# Patient Record
Sex: FEMALE | Race: WHITE | NOT HISPANIC OR LATINO | Employment: OTHER | URBAN - METROPOLITAN AREA
[De-identification: names, ages, dates, MRNs, and addresses within clinical notes are randomized per-mention and may not be internally consistent; named-entity substitution may affect disease eponyms.]

---

## 2017-01-18 ENCOUNTER — ALLSCRIPTS OFFICE VISIT (OUTPATIENT)
Dept: OTHER | Facility: OTHER | Age: 72
End: 2017-01-18

## 2017-02-02 ENCOUNTER — GENERIC CONVERSION - ENCOUNTER (OUTPATIENT)
Dept: OTHER | Facility: OTHER | Age: 72
End: 2017-02-02

## 2017-02-07 ENCOUNTER — TRANSCRIBE ORDERS (OUTPATIENT)
Dept: LAB | Facility: CLINIC | Age: 72
End: 2017-02-07

## 2017-02-07 ENCOUNTER — APPOINTMENT (OUTPATIENT)
Dept: LAB | Facility: CLINIC | Age: 72
End: 2017-02-07
Payer: MEDICARE

## 2017-02-07 DIAGNOSIS — E03.9 UNSPECIFIED HYPOTHYROIDISM: ICD-10-CM

## 2017-02-07 DIAGNOSIS — E55.9 UNSPECIFIED VITAMIN D DEFICIENCY: ICD-10-CM

## 2017-02-07 DIAGNOSIS — D35.3 BENIGN NEOPLASM OF PITUITARY GLAND AND CRANIOPHARYNGEAL DUCT (POUCH) (HCC): ICD-10-CM

## 2017-02-07 DIAGNOSIS — I95.9 HYPOTENSION, UNSPECIFIED: ICD-10-CM

## 2017-02-07 DIAGNOSIS — E22.1 HYPERPROLACTINEMIA (HCC): ICD-10-CM

## 2017-02-07 DIAGNOSIS — E66.9 OBESITY: ICD-10-CM

## 2017-02-07 DIAGNOSIS — E22.9 HYPERFUNCTION OF PITUITARY GLAND (HCC): ICD-10-CM

## 2017-02-07 DIAGNOSIS — D35.2 BENIGN NEOPLASM OF PITUITARY GLAND AND CRANIOPHARYNGEAL DUCT (POUCH) (HCC): ICD-10-CM

## 2017-02-07 DIAGNOSIS — D35.2 BENIGN NEOPLASM OF PITUITARY GLAND (HCC): ICD-10-CM

## 2017-02-07 DIAGNOSIS — R55 SYNCOPE AND COLLAPSE: ICD-10-CM

## 2017-02-07 DIAGNOSIS — E03.9 HYPOTHYROIDISM: ICD-10-CM

## 2017-02-07 DIAGNOSIS — E55.9 VITAMIN D DEFICIENCY: ICD-10-CM

## 2017-02-07 DIAGNOSIS — M81.0 AGE-RELATED OSTEOPOROSIS WITHOUT CURRENT PATHOLOGICAL FRACTURE: ICD-10-CM

## 2017-02-07 DIAGNOSIS — I95.9 HYPOTENSION: ICD-10-CM

## 2017-02-07 DIAGNOSIS — E66.9 OBESITY, UNSPECIFIED: ICD-10-CM

## 2017-02-07 DIAGNOSIS — M81.0 OSTEOPOROSIS, UNSPECIFIED: ICD-10-CM

## 2017-02-07 DIAGNOSIS — E22.9 PITUITARY HYPERFUNCTION (HCC): ICD-10-CM

## 2017-02-07 DIAGNOSIS — E22.9 PITUITARY HYPERFUNCTION (HCC): Primary | ICD-10-CM

## 2017-02-07 LAB
25(OH)D3 SERPL-MCNC: 45.7 NG/ML (ref 30–100)
ALBUMIN SERPL BCP-MCNC: 3.4 G/DL (ref 3.5–5)
ANION GAP SERPL CALCULATED.3IONS-SCNC: 8 MMOL/L (ref 4–13)
BUN SERPL-MCNC: 17 MG/DL (ref 5–25)
CALCIUM SERPL-MCNC: 9.3 MG/DL (ref 8.3–10.1)
CHLORIDE SERPL-SCNC: 107 MMOL/L (ref 100–108)
CO2 SERPL-SCNC: 26 MMOL/L (ref 21–32)
CREAT SERPL-MCNC: 0.92 MG/DL (ref 0.6–1.3)
FSH SERPL-ACNC: 51.8 MIU/ML
GFR SERPL CREATININE-BSD FRML MDRD: >60 ML/MIN/1.73SQ M
GLUCOSE SERPL-MCNC: 103 MG/DL (ref 65–140)
LH SERPL-ACNC: 25.8 MIU/ML
PHOSPHATE SERPL-MCNC: 3.4 MG/DL (ref 2.3–4.1)
POTASSIUM SERPL-SCNC: 4.3 MMOL/L (ref 3.5–5.3)
PTH-INTACT SERPL-MCNC: 67.5 PG/ML (ref 14–72)
SODIUM SERPL-SCNC: 141 MMOL/L (ref 136–145)
T4 FREE SERPL-MCNC: 1.02 NG/DL (ref 0.76–1.46)
TSH SERPL DL<=0.05 MIU/L-ACNC: 1.71 UIU/ML (ref 0.36–3.74)

## 2017-02-07 PROCEDURE — 84146 ASSAY OF PROLACTIN: CPT

## 2017-02-07 PROCEDURE — 83001 ASSAY OF GONADOTROPIN (FSH): CPT

## 2017-02-07 PROCEDURE — 80069 RENAL FUNCTION PANEL: CPT

## 2017-02-07 PROCEDURE — 84439 ASSAY OF FREE THYROXINE: CPT

## 2017-02-07 PROCEDURE — 82024 ASSAY OF ACTH: CPT

## 2017-02-07 PROCEDURE — 36415 COLL VENOUS BLD VENIPUNCTURE: CPT

## 2017-02-07 PROCEDURE — 83002 ASSAY OF GONADOTROPIN (LH): CPT

## 2017-02-07 PROCEDURE — 83970 ASSAY OF PARATHORMONE: CPT

## 2017-02-07 PROCEDURE — 82306 VITAMIN D 25 HYDROXY: CPT

## 2017-02-07 PROCEDURE — 84305 ASSAY OF SOMATOMEDIN: CPT

## 2017-02-07 PROCEDURE — 84443 ASSAY THYROID STIM HORMONE: CPT

## 2017-02-08 LAB
ACTH PLAS-MCNC: 22.4 PG/ML (ref 7.2–63.3)
IGF-I SERPL-MCNC: 87 NG/ML (ref 38–163)
PROLACTIN SERPL-MCNC: 14 NG/ML

## 2017-02-16 ENCOUNTER — GENERIC CONVERSION - ENCOUNTER (OUTPATIENT)
Dept: OTHER | Facility: OTHER | Age: 72
End: 2017-02-16

## 2017-02-20 ENCOUNTER — GENERIC CONVERSION - ENCOUNTER (OUTPATIENT)
Dept: OTHER | Facility: OTHER | Age: 72
End: 2017-02-20

## 2017-03-09 ENCOUNTER — GENERIC CONVERSION - ENCOUNTER (OUTPATIENT)
Dept: OTHER | Facility: OTHER | Age: 72
End: 2017-03-09

## 2017-03-10 ENCOUNTER — APPOINTMENT (OUTPATIENT)
Dept: LAB | Facility: CLINIC | Age: 72
End: 2017-03-10
Payer: MEDICARE

## 2017-03-10 ENCOUNTER — TRANSCRIBE ORDERS (OUTPATIENT)
Dept: MRI IMAGING | Facility: CLINIC | Age: 72
End: 2017-03-10

## 2017-03-10 DIAGNOSIS — I95.9 HYPOTENSION, UNSPECIFIED: ICD-10-CM

## 2017-03-10 DIAGNOSIS — D35.2 BENIGN NEOPLASM OF PITUITARY GLAND AND CRANIOPHARYNGEAL DUCT (POUCH) (HCC): ICD-10-CM

## 2017-03-10 DIAGNOSIS — E55.9 UNSPECIFIED VITAMIN D DEFICIENCY: Primary | ICD-10-CM

## 2017-03-10 DIAGNOSIS — M81.0 OSTEOPOROSIS, UNSPECIFIED: ICD-10-CM

## 2017-03-10 DIAGNOSIS — E22.9 PITUITARY HYPERFUNCTION (HCC): ICD-10-CM

## 2017-03-10 DIAGNOSIS — E66.9 OBESITY, UNSPECIFIED: ICD-10-CM

## 2017-03-10 DIAGNOSIS — E22.1 HYPERPROLACTINEMIA (HCC): ICD-10-CM

## 2017-03-10 DIAGNOSIS — D35.2 BENIGN NEOPLASM OF PITUITARY GLAND (HCC): ICD-10-CM

## 2017-03-10 DIAGNOSIS — R55 SYNCOPE AND COLLAPSE: ICD-10-CM

## 2017-03-10 DIAGNOSIS — D35.3 BENIGN NEOPLASM OF PITUITARY GLAND AND CRANIOPHARYNGEAL DUCT (POUCH) (HCC): ICD-10-CM

## 2017-03-10 DIAGNOSIS — E03.9 UNSPECIFIED HYPOTHYROIDISM: ICD-10-CM

## 2017-03-10 DIAGNOSIS — E03.9 HYPOTHYROIDISM: ICD-10-CM

## 2017-03-10 DIAGNOSIS — E55.9 UNSPECIFIED VITAMIN D DEFICIENCY: ICD-10-CM

## 2017-03-10 PROCEDURE — 36415 COLL VENOUS BLD VENIPUNCTURE: CPT

## 2017-03-10 PROCEDURE — 84146 ASSAY OF PROLACTIN: CPT

## 2017-03-13 ENCOUNTER — APPOINTMENT (OUTPATIENT)
Dept: LAB | Facility: CLINIC | Age: 72
End: 2017-03-13
Payer: MEDICARE

## 2017-03-13 DIAGNOSIS — D35.2 BENIGN NEOPLASM OF PITUITARY GLAND (HCC): ICD-10-CM

## 2017-03-13 DIAGNOSIS — E03.9 HYPOTHYROIDISM: ICD-10-CM

## 2017-03-13 LAB — CORTIS AM PEAK SERPL-MCNC: 24.2 UG/ML (ref 4.2–22.4)

## 2017-03-13 PROCEDURE — 36415 COLL VENOUS BLD VENIPUNCTURE: CPT

## 2017-03-13 PROCEDURE — 82533 TOTAL CORTISOL: CPT

## 2017-03-14 LAB — MISCELLANEOUS LAB TEST RESULT: NORMAL

## 2017-03-17 ENCOUNTER — GENERIC CONVERSION - ENCOUNTER (OUTPATIENT)
Dept: OTHER | Facility: OTHER | Age: 72
End: 2017-03-17

## 2017-04-14 ENCOUNTER — GENERIC CONVERSION - ENCOUNTER (OUTPATIENT)
Dept: OTHER | Facility: OTHER | Age: 72
End: 2017-04-14

## 2017-04-17 ENCOUNTER — ALLSCRIPTS OFFICE VISIT (OUTPATIENT)
Dept: OTHER | Facility: OTHER | Age: 72
End: 2017-04-17

## 2017-04-27 ENCOUNTER — GENERIC CONVERSION - ENCOUNTER (OUTPATIENT)
Dept: OTHER | Facility: OTHER | Age: 72
End: 2017-04-27

## 2017-05-02 ENCOUNTER — ALLSCRIPTS OFFICE VISIT (OUTPATIENT)
Dept: OTHER | Facility: OTHER | Age: 72
End: 2017-05-02

## 2017-05-02 DIAGNOSIS — I48.91 ATRIAL FIBRILLATION (HCC): ICD-10-CM

## 2017-05-08 ENCOUNTER — APPOINTMENT (OUTPATIENT)
Dept: LAB | Facility: CLINIC | Age: 72
End: 2017-05-08
Payer: MEDICARE

## 2017-05-08 DIAGNOSIS — E78.00 PURE HYPERCHOLESTEROLEMIA: ICD-10-CM

## 2017-05-08 DIAGNOSIS — D35.2 BENIGN NEOPLASM OF PITUITARY GLAND (HCC): ICD-10-CM

## 2017-05-08 DIAGNOSIS — I48.91 ATRIAL FIBRILLATION (HCC): ICD-10-CM

## 2017-05-08 DIAGNOSIS — I10 ESSENTIAL (PRIMARY) HYPERTENSION: ICD-10-CM

## 2017-05-08 DIAGNOSIS — E55.9 VITAMIN D DEFICIENCY: ICD-10-CM

## 2017-05-08 DIAGNOSIS — E03.9 HYPOTHYROIDISM: ICD-10-CM

## 2017-05-08 DIAGNOSIS — E22.9 HYPERFUNCTION OF PITUITARY GLAND (HCC): ICD-10-CM

## 2017-05-08 LAB
ALBUMIN SERPL BCP-MCNC: 3.6 G/DL (ref 3.5–5)
ALP SERPL-CCNC: 136 U/L (ref 46–116)
ALT SERPL W P-5'-P-CCNC: 14 U/L (ref 12–78)
ANION GAP SERPL CALCULATED.3IONS-SCNC: 10 MMOL/L (ref 4–13)
AST SERPL W P-5'-P-CCNC: 13 U/L (ref 5–45)
BILIRUB SERPL-MCNC: 0.33 MG/DL (ref 0.2–1)
BUN SERPL-MCNC: 12 MG/DL (ref 5–25)
CALCIUM SERPL-MCNC: 9.3 MG/DL (ref 8.3–10.1)
CHLORIDE SERPL-SCNC: 107 MMOL/L (ref 100–108)
CHOLEST SERPL-MCNC: 237 MG/DL (ref 50–200)
CK SERPL-CCNC: 27 U/L (ref 26–192)
CO2 SERPL-SCNC: 24 MMOL/L (ref 21–32)
CORTIS AM PEAK SERPL-MCNC: 18.9 UG/ML (ref 4.2–22.4)
CREAT SERPL-MCNC: 0.83 MG/DL (ref 0.6–1.3)
GFR SERPL CREATININE-BSD FRML MDRD: >60 ML/MIN/1.73SQ M
GLUCOSE P FAST SERPL-MCNC: 101 MG/DL (ref 65–99)
HDLC SERPL-MCNC: 96 MG/DL (ref 40–60)
LDLC SERPL CALC-MCNC: 122 MG/DL (ref 0–100)
POTASSIUM SERPL-SCNC: 4.1 MMOL/L (ref 3.5–5.3)
PROT SERPL-MCNC: 7.3 G/DL (ref 6.4–8.2)
SODIUM SERPL-SCNC: 141 MMOL/L (ref 136–145)
T4 FREE SERPL-MCNC: 1.05 NG/DL (ref 0.76–1.46)
TRIGL SERPL-MCNC: 97 MG/DL
TSH SERPL DL<=0.05 MIU/L-ACNC: 2.72 UIU/ML (ref 0.36–3.74)

## 2017-05-08 PROCEDURE — 80061 LIPID PANEL: CPT

## 2017-05-08 PROCEDURE — 82533 TOTAL CORTISOL: CPT

## 2017-05-08 PROCEDURE — 84439 ASSAY OF FREE THYROXINE: CPT

## 2017-05-08 PROCEDURE — 82550 ASSAY OF CK (CPK): CPT

## 2017-05-08 PROCEDURE — 84443 ASSAY THYROID STIM HORMONE: CPT

## 2017-05-08 PROCEDURE — 36415 COLL VENOUS BLD VENIPUNCTURE: CPT

## 2017-05-08 PROCEDURE — 80053 COMPREHEN METABOLIC PANEL: CPT

## 2017-05-16 ENCOUNTER — ALLSCRIPTS OFFICE VISIT (OUTPATIENT)
Dept: OTHER | Facility: OTHER | Age: 72
End: 2017-05-16

## 2017-05-18 ENCOUNTER — HOSPITAL ENCOUNTER (OUTPATIENT)
Dept: NON INVASIVE DIAGNOSTICS | Facility: CLINIC | Age: 72
Discharge: HOME | End: 2017-05-18
Payer: MEDICARE

## 2017-05-18 DIAGNOSIS — I48.91 ATRIAL FIBRILLATION (HCC): ICD-10-CM

## 2017-05-18 PROCEDURE — 93306 TTE W/DOPPLER COMPLETE: CPT

## 2017-05-20 ENCOUNTER — GENERIC CONVERSION - ENCOUNTER (OUTPATIENT)
Dept: OTHER | Facility: OTHER | Age: 72
End: 2017-05-20

## 2017-06-08 DIAGNOSIS — E22.9 HYPERFUNCTION OF PITUITARY GLAND (HCC): ICD-10-CM

## 2017-06-16 ENCOUNTER — GENERIC CONVERSION - ENCOUNTER (OUTPATIENT)
Dept: OTHER | Facility: OTHER | Age: 72
End: 2017-06-16

## 2017-07-03 ENCOUNTER — APPOINTMENT (OUTPATIENT)
Dept: LAB | Facility: CLINIC | Age: 72
End: 2017-07-03
Payer: MEDICARE

## 2017-07-03 ENCOUNTER — HOSPITAL ENCOUNTER (OUTPATIENT)
Dept: MRI IMAGING | Facility: CLINIC | Age: 72
Discharge: HOME | End: 2017-07-03
Payer: MEDICARE

## 2017-07-03 DIAGNOSIS — E22.9 HYPERFUNCTION OF PITUITARY GLAND (HCC): ICD-10-CM

## 2017-07-03 LAB — PROLACTIN SERPL-MCNC: 15.2 NG/ML

## 2017-07-03 PROCEDURE — 84146 ASSAY OF PROLACTIN: CPT

## 2017-07-03 PROCEDURE — A9585 GADOBUTROL INJECTION: HCPCS | Performed by: NURSE PRACTITIONER

## 2017-07-03 PROCEDURE — 36415 COLL VENOUS BLD VENIPUNCTURE: CPT

## 2017-07-03 PROCEDURE — 70553 MRI BRAIN STEM W/O & W/DYE: CPT

## 2017-07-03 RX ADMIN — GADOBUTROL 8 ML: 604.72 INJECTION INTRAVENOUS at 12:51

## 2017-07-04 ENCOUNTER — GENERIC CONVERSION - ENCOUNTER (OUTPATIENT)
Dept: OTHER | Facility: OTHER | Age: 72
End: 2017-07-04

## 2017-07-06 ENCOUNTER — GENERIC CONVERSION - ENCOUNTER (OUTPATIENT)
Dept: OTHER | Facility: OTHER | Age: 72
End: 2017-07-06

## 2017-07-10 ENCOUNTER — GENERIC CONVERSION - ENCOUNTER (OUTPATIENT)
Dept: OTHER | Facility: OTHER | Age: 72
End: 2017-07-10

## 2017-07-10 DIAGNOSIS — E22.9 HYPERFUNCTION OF PITUITARY GLAND (HCC): ICD-10-CM

## 2017-07-11 ENCOUNTER — GENERIC CONVERSION - ENCOUNTER (OUTPATIENT)
Dept: OTHER | Facility: OTHER | Age: 72
End: 2017-07-11

## 2017-07-18 ENCOUNTER — ALLSCRIPTS OFFICE VISIT (OUTPATIENT)
Dept: OTHER | Facility: OTHER | Age: 72
End: 2017-07-18

## 2017-09-27 ENCOUNTER — GENERIC CONVERSION - ENCOUNTER (OUTPATIENT)
Dept: OTHER | Facility: OTHER | Age: 72
End: 2017-09-27

## 2017-11-07 ENCOUNTER — TRANSCRIBE ORDERS (OUTPATIENT)
Dept: LAB | Facility: HOSPITAL | Age: 72
End: 2017-11-07

## 2017-11-07 ENCOUNTER — APPOINTMENT (OUTPATIENT)
Dept: LAB | Facility: HOSPITAL | Age: 72
End: 2017-11-07
Payer: MEDICARE

## 2017-11-07 DIAGNOSIS — R73.01 IMPAIRED FASTING GLUCOSE: ICD-10-CM

## 2017-11-07 DIAGNOSIS — Q99.8 IGF-1 DEFICIENCY: Primary | ICD-10-CM

## 2017-11-07 DIAGNOSIS — Q99.8 IGF-1 DEFICIENCY: ICD-10-CM

## 2017-11-07 DIAGNOSIS — E23.0: ICD-10-CM

## 2017-11-07 DIAGNOSIS — E55.9 VITAMIN D DEFICIENCY: ICD-10-CM

## 2017-11-07 DIAGNOSIS — E78.00 PURE HYPERCHOLESTEROLEMIA: ICD-10-CM

## 2017-11-07 LAB
25(OH)D3 SERPL-MCNC: 55.8 NG/ML (ref 30–100)
ALBUMIN SERPL BCP-MCNC: 3.7 G/DL (ref 3.5–5)
ALP SERPL-CCNC: 137 U/L (ref 46–116)
ALT SERPL W P-5'-P-CCNC: 14 U/L (ref 12–78)
ANION GAP SERPL CALCULATED.3IONS-SCNC: 11 MMOL/L (ref 4–13)
AST SERPL W P-5'-P-CCNC: 14 U/L (ref 5–45)
BASOPHILS # BLD AUTO: 0.13 THOUSANDS/ΜL (ref 0–0.1)
BASOPHILS NFR BLD AUTO: 2 % (ref 0–1)
BILIRUB SERPL-MCNC: 0.5 MG/DL (ref 0.2–1)
BUN SERPL-MCNC: 12 MG/DL (ref 5–25)
CALCIUM SERPL-MCNC: 9.3 MG/DL (ref 8.3–10.1)
CHLORIDE SERPL-SCNC: 102 MMOL/L (ref 100–108)
CHOLEST SERPL-MCNC: 207 MG/DL (ref 50–200)
CO2 SERPL-SCNC: 26 MMOL/L (ref 21–32)
CORTIS AM PEAK SERPL-MCNC: 19.1 UG/DL (ref 4.2–22.4)
CREAT SERPL-MCNC: 0.92 MG/DL (ref 0.6–1.3)
EOSINOPHIL # BLD AUTO: 0.35 THOUSAND/ΜL (ref 0–0.61)
EOSINOPHIL NFR BLD AUTO: 4 % (ref 0–6)
ERYTHROCYTE [DISTWIDTH] IN BLOOD BY AUTOMATED COUNT: 13.4 % (ref 11.6–15.1)
EST. AVERAGE GLUCOSE BLD GHB EST-MCNC: 114 MG/DL
GFR SERPL CREATININE-BSD FRML MDRD: 63 ML/MIN/1.73SQ M
GLUCOSE P FAST SERPL-MCNC: 121 MG/DL (ref 65–99)
HBA1C MFR BLD: 5.6 % (ref 4.2–6.3)
HCT VFR BLD AUTO: 38.4 % (ref 34.8–46.1)
HDLC SERPL-MCNC: 90 MG/DL (ref 40–60)
HGB BLD-MCNC: 12.4 G/DL (ref 11.5–15.4)
LDLC SERPL CALC-MCNC: 99 MG/DL (ref 0–100)
LYMPHOCYTES # BLD AUTO: 1.42 THOUSANDS/ΜL (ref 0.6–4.47)
LYMPHOCYTES NFR BLD AUTO: 18 % (ref 14–44)
MCH RBC QN AUTO: 30.4 PG (ref 26.8–34.3)
MCHC RBC AUTO-ENTMCNC: 32.3 G/DL (ref 31.4–37.4)
MCV RBC AUTO: 94 FL (ref 82–98)
MONOCYTES # BLD AUTO: 0.64 THOUSAND/ΜL (ref 0.17–1.22)
MONOCYTES NFR BLD AUTO: 8 % (ref 4–12)
NEUTROPHILS # BLD AUTO: 5.38 THOUSANDS/ΜL (ref 1.85–7.62)
NEUTS SEG NFR BLD AUTO: 68 % (ref 43–75)
NRBC BLD AUTO-RTO: 0 /100 WBCS
PLATELET # BLD AUTO: 202 THOUSANDS/UL (ref 149–390)
PMV BLD AUTO: 11.7 FL (ref 8.9–12.7)
POTASSIUM SERPL-SCNC: 4.2 MMOL/L (ref 3.5–5.3)
PROLACTIN SERPL-MCNC: 13.7 NG/ML
PROT SERPL-MCNC: 7.7 G/DL (ref 6.4–8.2)
RBC # BLD AUTO: 4.08 MILLION/UL (ref 3.81–5.12)
SODIUM SERPL-SCNC: 139 MMOL/L (ref 136–145)
T4 FREE SERPL-MCNC: 1.11 NG/DL (ref 0.76–1.46)
TRIGL SERPL-MCNC: 88 MG/DL
TSH SERPL DL<=0.05 MIU/L-ACNC: 2.61 UIU/ML (ref 0.36–3.74)
WBC # BLD AUTO: 7.94 THOUSAND/UL (ref 4.31–10.16)

## 2017-11-07 PROCEDURE — 83036 HEMOGLOBIN GLYCOSYLATED A1C: CPT

## 2017-11-07 PROCEDURE — 82306 VITAMIN D 25 HYDROXY: CPT

## 2017-11-07 PROCEDURE — 84439 ASSAY OF FREE THYROXINE: CPT

## 2017-11-07 PROCEDURE — 82533 TOTAL CORTISOL: CPT

## 2017-11-07 PROCEDURE — 80061 LIPID PANEL: CPT

## 2017-11-07 PROCEDURE — 84443 ASSAY THYROID STIM HORMONE: CPT

## 2017-11-07 PROCEDURE — 80053 COMPREHEN METABOLIC PANEL: CPT

## 2017-11-07 PROCEDURE — 84146 ASSAY OF PROLACTIN: CPT

## 2017-11-07 PROCEDURE — 85025 COMPLETE CBC W/AUTO DIFF WBC: CPT

## 2017-11-07 PROCEDURE — 36415 COLL VENOUS BLD VENIPUNCTURE: CPT

## 2017-11-14 ENCOUNTER — ALLSCRIPTS OFFICE VISIT (OUTPATIENT)
Dept: OTHER | Facility: OTHER | Age: 72
End: 2017-11-14

## 2017-11-15 NOTE — PROGRESS NOTES
Assessment  Assessed    1  Atrial fibrillation (427 31) (I48 91)   2  Anticoagulated by anticoagulation treatment (V58 61) (Z79 01)   3  Anxiety (300 00) (F41 9)   4  Benign essential hypertension (401 1) (I10)   5  Hypercholesterolemia (272 0) (E78 00)    Plan  Atrial fibrillation    · Xarelto 20 MG Oral Tablet   Rx By: Angelita Purcell; Dispense: 30 Days ; #:30 TAB; Refill: 5;For: Atrial fibrillation; THAO = N; Sent To: [facility]/PHARMACY #[id number] Last Updated By: Harshal Jorgensen; 11/14/2017 2:51:58 PM   · Eliquis 5 MG Oral Tablet; Take 1 tablet twice daily   Rx By: Harshal Jorgensen; Dispense: 0 Days ; #:60 Tablet; Refill: 5;Atrial fibrillation; THAO = N; Print Rx    Discussion/Summary  Cardiology Discussion Summary Free Text Note Form [facility]:   Patient with multiple medical problems who seems to be doing reasonably well from cardiac standpoint  Previous studies reviewed with patient  Medications reviewed and possible side effects discussed  concepts of cardiovascular disease , signs and symptoms of heart disease  Dietary and risk factor modification reinforced  All questions answered  Safety measures reviewed  Patient advised to report any problems prompting medical attention  Patient understands the risks and benefits of anticoagulation to prevent thromboembolic risk from atrial fibrillation  Patient will be switched switched to Eliquis starting January  Prescription printed out for her  Patient report any bleeding issues  Patient will continue to follow up with primary care physician, Neurology as well as Endocrinology  Symptoms to watch out from cardiac standpoint discussed with patient  Importance of adequate hydration reinforced with the patient  Patient had a few questions which were answered  Follow-up in 6 months  Goals and Barriers: The patient has the current Goals: Compliance with medications  The patent has the current Barriers: None  Patient's Capacity to Self-Care: Patient is able to Self-Care     Patient Education: Educational resources provided: Please see discussion summary  Medication SE Review and Pt Understands Tx: Possible side effects of new medications were reviewed with the patient/guardian today  Counseling Documentation With Imm: The patient was counseled regarding diagnostic results,-- risk factor reductions,-- impressions,-- risks and benefits of treatment options,-- importance of compliance with treatment  Chief Complaint  Chief Complaint Chronic Condition [facility]: Patient is here today for follow up of chronic conditions described in HPI  History of Present Illness  Cardiology [facility] Free Text Note Form [facility]: Patient presents for follow-up visit  Patient does have multiple medical problems including pituitary adenoma, atrial fibrillation on anticoagulation  Patient states that her anticoagulation is not covered starting January  The alternative is Eliquis or Pradaxa  Patient denies any chest pain  Patient does have some shortness of breath with exertion which is stable  No history of palpitations  No history of leg edema orthopnea PND  No history of bleeding issues  No history of presyncope syncope  Patient states that she has been compliant with all her present medications and followed by Neurology as well as Endocrinology  Review of Systems  Cardiology Female ROS:    Cardiac: as noted in HPI  Skin: No complaints of nonhealing sores or skin rash  Genitourinary: No complaints of recurrent urinary tract infections, frequent urination at night, difficult urination, blood in urine, kidney stones, loss of bladder control, kidney problems, denies any birth control or hormone replacement, is not post menopausal, not currently pregnant  Psychological: anxiety  General: No complaints of trouble sleeping, lack of energy, fatigue, appetite changes, weight changes, fever, frequent infections, or night sweats    Respiratory: No complaints of shortness of breath, cough with sputum, or wheezing  HEENT: No complaints of serious problems, hearing problems, nose problems, throat problems, or snoring  Gastrointestinal: No complaints of liver problems, nausea, vomiting, heartburn, constipation, bloody stools, diarrhea, problems swallowing, adbominal pain, or rectal bleeding  Hematologic: No complaints of bleeding disorders, anemia, blood clots, or excessive brusing  Neurological: as noted in HPI  Musculoskeletal: arthritis   ROS Reviewed:   ROS reviewed  Active Problems  Problems    1  Abnormal brain MRI (793 0) (R90 89)   2  Abnormal fasting glucose (790 29) (R73 01)   3  Anticoagulated by anticoagulation treatment (V58 61) (Z79 01)   4  Anxiety (300 00) (F41 9)   5  Atrial fibrillation (427 31) (I48 91)   6  Benign essential hypertension (401 1) (I10)   7  Blepharoptosis (374 30) (H02 409)   8  Breast cancer screening (V76 10) (Z12 39)   9  Depression (311) (F32 9)   10  Dysphagia, pharyngoesophageal (787 24) (R13 14)   11  Elevated prolactin level (253 1) (E22 9)   12  Encounter for monitoring long-term proton pump inhibitor therapy (V58 83,V58 69)  (Z51 81,Z79 899)   13  Encounter for special screening examination for genitourinary disorder (V81 6) (Z13 89)   14  Esophageal ring, acquired (530 3) (K22 2)   15  Fibrocystic breast disease (610 1) (N60 19)   16  Gastroesophageal reflux disease with esophagitis (530 11) (K21 0)   17  Headache (784 0) (R51)   18  Hypercholesterolemia (272 0) (E78 00)   19  Hyperprolactinemia (253 1) (E22 1)   20  Hypotension (458 9) (I95 9)   21  Hypothyroidism (244 9) (E03 9)   22  Loss of smell (781 1) (R43 0)   23  Lumbago With Sciatica (724 3)   24  Need for influenza vaccination (V04 81) (Z23)   25  History of Need for pneumococcal vaccination (V03 82) (Z23)   26  Need for revaccination (V05 9) (Z23)   27  Obesity (278 00) (E66 9)   28  Osteoporosis (733 00) (M81 0)   29  Pituitary adenoma (227 3) (D35 2)   30  Postural hypotension (458 0) (I95 1)   31  Preoperative evaluation to rule out surgical contraindication (V72 83) (Z01 818)   32  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)   33  Screening for genitourinary condition (V81 6) (Z13 89)   34  Strain of thoracic region (847 1) (S29 019A)   35  Syncope (780 2) (R55)   36  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)   37  Unstable balance (781 2) (R26 89)   38  Visual disturbances (368 9) (H53 9)   39  Vitamin D deficiency (268 9) (E55 9)   40  Weight gain (783 1) (R63 5)    Past Medical History  Problems    1  History of A-fib (427 31) (I48 91)   2  History of Ankle fracture (824 8) (S82 899A)   3  History of Ankle swelling, left (719 07) (M25 472)   4  Anxiety (300 00) (F41 9)   5  History of Brain mass (348 9) (G93 9)   6  History of Cataract (366 9) (H26 9)   7  History of Chest discomfort (786 59) (R07 89)   8  History of Diplopia (368 2) (H53 2)   9  History of Dysphagia, pharyngoesophageal phase (787 24) (R13 14)   10  History of Dyspnea (786 09) (R06 00)   11  History of Esophageal Perforation (530 4)   12  History of Fracture, shoulder (812 00) (S42 90XA)   13  History of Gastroduodenitis (535 50)   14  History of anemia (V12 3) (Z86 2)   15  History of chest pain (V13 89) (Z87 898)   16  History of fatigue (V13 89) (Z87 898)   17  History of gastritis (V12 79) (Z87 19)   18  History of Hyperlipidemia (272 4) (E78 5)   19  History of Hypertension (401 9) (I10)   20  Hypothyroidism (244 9) (E03 9)   21  History of Left heart failure (428 1) (I50 1)   22  History of Lightheadedness (780 4) (R42)   23  History of 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227 3) (D35 2)   24  History of Miscarriage (634 90) (O03 9)   25  History of Myalgia And Myositis (729 1)   26  History of Need for pneumococcal vaccination (V03 82) (Z23)   27  History of Pain, knee (719 46) (M25 569)   28  Pituitary adenoma (227 3) (D35 2)   29  History of Pulse irregularity (785 9) (R09 89)   30  History of Renal failure (586) (N19)   31   History of Wrist fracture (814 00) (P43 489U)  Active Problems And Past Medical History Reviewed: The active problems and past medical history were reviewed and updated today  Surgical History  Problems    1  History of Appendectomy   2  History of Cataract Surgery   3  History of Cath Stent Placement   4  History of Diagnostic Esophagogastroduodenoscopy   5  History of Dilation And Curettage   6  History of Percutaneous Vertebral Augmentation Kyphoplasty   7  History of Tonsillectomy   8  History of Total Abdominal Hysterectomy With Removal Of Both Ovaries  Surgical History Reviewed: The surgical history was reviewed and updated today  Family History  Mother    1  Family history of    2  Family history of Osteoporosis (V17 81)   3  Family history of Sepsis  Father    4  Family history of Cerebellar Hemorrhage   5  Family history of    6  Family history of Hemorrhage in the brain   7  Family history of Hypertension (V17 49)  Sister    6  Family history of Thyroid Disorder (V18 19)  Maternal Grandmother    9  Family history of Colon Cancer (V16 0)   10  Family history of   Paternal Grandmother    6  Family history of Carcinoma Of The Stomach (V16 0)   12  Family history of    15  Family history of Stomach cancer  Maternal Grandfather    15  Family history of Acute Myocardial Infarction (V17 3)   15  Family history of   Paternal Grandfather    12  Family history of    16  Family history of Urinary retention  Paternal Uncle    25  Family history of Stroke Syndrome (V17 1)  Family History Reviewed: The family history was reviewed and updated today  Social History  Problems    · Alcohol Use (History)   · Being A Social Drinker   · Completed technical school   · Drinks wine (V49 89) (Z78 9)   · Functioning activity level   · Lives independently   · Marital History -    · Never A Smoker   · Never Used Drugs   · No drug use   · Occupation: Retired  Social History Reviewed:  The social history was reviewed and updated today  Current Meds   1  Amitriptyline HCl - 25 MG Oral Tablet; TAKE 1 TABLET AT BEDTIME; Therapy: 12SCF7680 to (Evaluate:80Lbh8761)  Requested for: 60PRX1902; Last Rx:12Oct2017 Ordered   2  AmLODIPine Besylate 5 MG Oral Tablet; TAKE 1 TABLET BY MOUTH DAILY IN THE MORNING; Therapy: 63Zgz0532 to (Last Rx:72Jge4867)  Requested for: 44Hxj2907 Ordered   3  Butalbital-APAP-Caffeine -40 MG Oral Tablet; TAKE 1 TABLET BY MOUTH TWICE A DAY AS NEEDED; Therapy: 53MPM9366 to (Lore Adams)  Requested for: 66IFL0018; Last Rx:07Nov2017 Ordered   4  Cabergoline 0 5 MG Oral Tablet; take 1 tablet every day; Therapy: 98PZE3112 to (Evaluate:69Dye5430)  Requested for: 60KRV3045; Last Rx:42Ilx8563 Ordered   5  Levothyroxine Sodium 25 MCG Oral Tablet; take 1 tablet by mouth every day; Therapy: 32UZS8629 to (Evaluate:19Oct2017)  Requested for: 74Zmp1330; Last Rx:93Zpo5302 Ordered   6  Lisinopril 5 MG Oral Tablet; take 1 tablet by mouth every day; Therapy: 15FXK8053 to (Last Rx:54Edc6536)  Requested for: 14Vdd5487 Ordered   7  LORazepam 1 MG Oral Tablet; take 1 tablet twice a day; Therapy: 15OTB7672 to (Antonia Bernabe)  Requested for: 42OEP9923; Last Rx:22Rgz6508 Ordered   8  Metoprolol Tartrate 50 MG Oral Tablet; take 1 tablet by mouth twice a day; Therapy: 45BYB5023 to (Evaluate:89Bdt0942)  Requested for: 21KZZ3079; Last Rx:47Zfa4023 Ordered   9  Midodrine HCl - 2 5 MG Oral Tablet; take 1 tablet by mouth twice a day; Therapy: 95IFE9555 to (Evaluate:02Jan2018)  Requested for: 71Gjq4746; Last Rx:85Vit2366 Ordered   10  Omeprazole 20 MG Oral Capsule Delayed Release; take 1 capsule twice a day; Therapy: 49QFL5972 to (Last Rx:09Nov2017)  Requested for: 81EQF0356 Ordered   11  Simvastatin 20 MG Oral Tablet; take 1 tablet daily at bedtime; Therapy: 47UEY0987 to [phone])  Requested for: 27AAX6828; Last  Rx:24Mar2017 Ordered   12  Vitamin D 2000 UNIT Oral Tablet;  Take 1 tablet daily; Therapy: 85VRN9283 to (Last Rx:48Dxb5395) Ordered   13  Xarelto 20 MG Oral Tablet; take 1 tablet by mouth every day; Therapy: 25EQM4361 to (Juan Polanco)  Requested for: 75Iny5196; Last  Rx:62Kbf9030 Ordered  Medication List Reviewed: The medication list was reviewed and updated today  Allergies  Medication    1  Keflex TABS   2  Cephalosporins  Non-Medication    3  No Known Environmental Allergies   4  No Known Food Allergies  Denied    5  Anesthesia Extension Tubing Miscellaneous    Vitals  Vital Signs    Recorded: 13CVY2516 02:32PM   Heart Rate 71   Systolic 352   Diastolic 82   Height 5 ft 3 in   Weight 178 lb    BMI Calculated 31 53   BSA Calculated 1 84   O2 Saturation 98       Physical Exam   Constitutional  General appearance: No acute distress, well appearing and well nourished  Eyes  Conjunctiva and Sclera examination: Conjunctiva pink, sclera anicteric  Ears, Nose, Mouth, and Throat - Oropharynx: Clear, nares are clear, mucous membranes are moist   Neck  Neck and thyroid: Normal, supple, trachea midline, no thyromegaly  Pulmonary  Respiratory effort: No increased work of breathing or signs of respiratory distress  Auscultation of lungs: Clear to auscultation, no rales, no rhonchi, no wheezing, good air movement  Cardiovascular  Auscultation of heart: Abnormal  -- Irregularly irregular  Carotid pulses: Normal, 2+ bilaterally  Peripheral vascular exam: Normal pulses throughout, no tenderness, erythema or swelling  Pedal pulses: Normal, 2+ bilaterally  Examination of extremities for edema and/or varicosities: Normal    Abdomen  Abdomen: Non-tender and no distention  Liver and spleen: No hepatomegaly or splenomegaly  Musculoskeletal Gait and station: Normal gait  -- Digits and nails: Normal without clubbing or cyanosis  -- Inspection/palpation of joints, bones, and muscles: Normal, ROM normal    Skin - Skin and subcutaneous tissue: Normal without rashes or lesions  Skin is warm and well perfused, normal turgor  Neurologic - Speech: Normal    Psychiatric - Orientation to person, place, and time: Normal -- Mood and affect: Normal       Health Management  Health Maintenance   *VB - Fall Risk Assessment  (Dx Z13 89 Screen for Neurologic Disorder); every 1 year; [id number]; Next Due: 21Apr2015; Overdue    Future Appointments    Date/Time Provider Specialty Site   12/19/2017 01:55 PM Cole Baxter MD Neurology [facility]    12/05/2017 01:00 PM [address] [facility], [address]  Gastroenterology Adult [facility]   11/20/2017 11:30 AM SIMON Del Cid   Internal Medicine [facility]       Signatures   Electronically signed by : SIMON Mendoza ; Nov 14 2017  9:14PM EST                       (Author)

## 2017-11-16 DIAGNOSIS — R74.8 ABNORMAL LEVELS OF OTHER SERUM ENZYMES: ICD-10-CM

## 2017-11-16 DIAGNOSIS — N28.1 ACQUIRED CYST OF KIDNEY: ICD-10-CM

## 2017-11-16 DIAGNOSIS — R73.01 IMPAIRED FASTING GLUCOSE: ICD-10-CM

## 2017-11-16 DIAGNOSIS — E55.9 VITAMIN D DEFICIENCY: ICD-10-CM

## 2017-11-16 DIAGNOSIS — E22.9 HYPERFUNCTION OF PITUITARY GLAND (HCC): ICD-10-CM

## 2017-11-16 DIAGNOSIS — D35.2 BENIGN NEOPLASM OF PITUITARY GLAND (HCC): ICD-10-CM

## 2017-11-16 DIAGNOSIS — E78.00 PURE HYPERCHOLESTEROLEMIA: ICD-10-CM

## 2017-11-20 ENCOUNTER — ALLSCRIPTS OFFICE VISIT (OUTPATIENT)
Dept: OTHER | Facility: OTHER | Age: 72
End: 2017-11-20

## 2017-11-20 ENCOUNTER — APPOINTMENT (OUTPATIENT)
Dept: LAB | Facility: CLINIC | Age: 72
End: 2017-11-20
Payer: MEDICARE

## 2017-11-20 DIAGNOSIS — R74.8 ABNORMAL LEVELS OF OTHER SERUM ENZYMES: ICD-10-CM

## 2017-11-20 DIAGNOSIS — D35.2 BENIGN NEOPLASM OF PITUITARY GLAND (HCC): ICD-10-CM

## 2017-11-20 DIAGNOSIS — R73.01 IMPAIRED FASTING GLUCOSE: ICD-10-CM

## 2017-11-20 LAB
EST. AVERAGE GLUCOSE BLD GHB EST-MCNC: 111 MG/DL
GGT SERPL-CCNC: 276 U/L (ref 5–85)
HBA1C MFR BLD: 5.5 % (ref 4.2–6.3)

## 2017-11-20 PROCEDURE — 36415 COLL VENOUS BLD VENIPUNCTURE: CPT

## 2017-11-20 PROCEDURE — 83036 HEMOGLOBIN GLYCOSYLATED A1C: CPT

## 2017-11-20 PROCEDURE — 82977 ASSAY OF GGT: CPT

## 2017-11-20 PROCEDURE — 84080 ASSAY ALKALINE PHOSPHATASES: CPT

## 2017-11-20 PROCEDURE — 84075 ASSAY ALKALINE PHOSPHATASE: CPT

## 2017-11-21 NOTE — PROGRESS NOTES
Assessment    1  Benign essential hypertension (401 1) (I10)  2  Hypercholesterolemia (272 0) (E78 00)  3  Hyperprolactinemia (253 1) (E22 1)  4  Hypothyroidism (244 9) (E03 9)  5  Atrial fibrillation (427 31) (I48 91)  6  Pituitary adenoma (227 3) (D35 2)  7  Abnormal fasting glucose (790 29) (R73 01)    Plan  Abnormal fasting glucose, Atrial fibrillation, Benign essential hypertension, Hypercholesterolemia,Hypothyroidism    · (1) HEMOGLOBIN A1C; Status:Active; Requested for:20Apr2018; Abnormal fasting glucose, Elevated alkaline phosphatase level    · (1) HEMOGLOBIN A1C; Status:Active; Requested for:20Nov2017;   Atrial fibrillation, Benign essential hypertension, Hypercholesterolemia, Hypothyroidism    · (1) CK (CPK); Status:Active; Requested for:20Apr2018;    · (1) COMPREHENSIVE METABOLIC PANEL; Status:Active; Requested for:20Apr2018;    · (1) LIPID PANEL FASTING W DIRECT LDL REFLEX; Status:Active; Requested for:20Apr2018;    · (1) TSH; Status:Active; Requested for:20Apr2018;   Elevated alkaline phosphatase level    · (1) ALKALINE PHOSPHATASE ISOENZYMES; Status:Active; Requested for:20Nov2017;    · (1) BONE SPECIFIC ALKALINE PHOSPHATASE; Status:Active; Requested for:20Nov2017;    · * US ABDOMEN COMPLETE; Status:Hold For - Scheduling; Requested for:20Nov2017;   Elevated alkaline phosphatase level, Pituitary adenoma    · (1) GGT; Status:Active; Requested for:20Nov2017;     Discussion/Summary  Discussion Summary:   1) htn well controlled bmp wnlhyperlipidemia ldl at goal ck and lfts wnlpituitary adenoma following with neurology and with endocrine on cabergolineHypothyroidism chemically and clinically euthyroid follow tshelevated alk phos will chek abdominal USimpaired fasting glucose no overt dm check a1c  Counseling Documentation With Imm: The patient was counseled regarding diagnostic results,-- instructions for management,-- risk factor reductions,-- impressions        Chief Complaint  Chief Complaint Free Text Note Form: Follow up      History of Present Illness  HPI: Patient presents in followup for his medical problems and to review recent lab work  She is without acute complaints      Review of Systems  Complete-Female:  Constitutional: No fever, no chills, feels well, no tiredness, no recent weight gain or weight loss  Eyes: No complaints of eye pain, no red eyes, no eyesight problems, no discharge, no dry eyes, no itching of eyes  ENT: no complaints of earache, no loss of hearing, no nose bleeds, no nasal discharge, no sore throat, no hoarseness  Cardiovascular: No complaints of slow heart rate, no fast heart rate, no chest pain, no palpitations, no leg claudication, no lower extremity edema  Respiratory: No complaints of shortness of breath, no wheezing, no cough, no SOB on exertion, no orthopnea, no PND  Gastrointestinal: No complaints of abdominal pain, no constipation, no nausea or vomiting, no diarrhea, no bloody stools  Genitourinary: No complaints of dysuria, no incontinence, no pelvic pain, no dysmenorrhea, no vaginal discharge or bleeding  Musculoskeletal: No complaints of arthralgias, no myalgias, no joint swelling or stiffness, no limb pain or swelling  Integumentary: No complaints of skin rash or lesions, no itching, no skin wounds, no breast pain or lump  Neurological: No complaints of headache, no confusion, no convulsions, no numbness, no dizziness or fainting, no tingling, no limb weakness, no difficulty walking  Psychiatric: Not suicidal, no sleep disturbance, no anxiety or depression, no change in personality, no emotional problems  Endocrine: No complaints of proptosis, no hot flashes, no muscle weakness, no deepening of the voice, no feelings of weakness  Hematologic/Lymphatic: No complaints of swollen glands, no swollen glands in the neck, does not bleed easily, does not bruise easily     Preventive Quality 65 Older:  Preventive Quality 65 and Older: Falls Risk: The patient fell 0 times in the past 12 months  ROS Reviewed:   ROS reviewed  Active Problems  1  Abnormal brain MRI (793 0) (R90 89)  2  Abnormal fasting glucose (790 29) (R73 01)  3  Anticoagulated by anticoagulation treatment (V58 61) (Z79 01)  4  Anxiety (300 00) (F41 9)  5  Atrial fibrillation (427 31) (I48 91)  6  Benign essential hypertension (401 1) (I10)  7  Blepharoptosis (374 30) (H02 409)  8  Breast cancer screening (V76 10) (Z12 39)  9  Depression (311) (F32 9)  10  Dysphagia, pharyngoesophageal (787 24) (R13 14)  11  Elevated prolactin level (253 1) (E22 9)  12  Encounter for monitoring long-term proton pump inhibitor therapy (V58 83,V58 69) (Z51 81,Z79 899)  13  Encounter for special screening examination for genitourinary disorder (V81 6) (Z13 89)  14  Esophageal ring, acquired (530 3) (K22 2)  15  Fibrocystic breast disease (610 1) (N60 19)  16  Gastroesophageal reflux disease with esophagitis (530 11) (K21 0)  17  Headache (784 0) (R51)  18  Hypercholesterolemia (272 0) (E78 00)  19  Hyperprolactinemia (253 1) (E22 1)  20  Hypotension (458 9) (I95 9)  21  Hypothyroidism (244 9) (E03 9)  22  Loss of smell (781 1) (R43 0)  23  Lumbago With Sciatica (724 3)  24  Need for influenza vaccination (V04 81) (Z23)  25  History of Need for pneumococcal vaccination (V03 82) (Z23)  26  Need for revaccination (V05 9) (Z23)  27  Obesity (278 00) (E66 9)  28  Osteoporosis (733 00) (M81 0)  29  Pituitary adenoma (227 3) (D35 2)  30  Postural hypotension (458 0) (I95 1)  31  Preoperative evaluation to rule out surgical contraindication (V72 83) (Z01 818)  32  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)  33  Screening for genitourinary condition (V81 6) (Z13 89)  34  Strain of thoracic region (847 1) (S29 019A)  35  Syncope (780 2) (R55)  36  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)  37  Unstable balance (781 2) (R26 89)  38  Visual disturbances (368 9) (H53 9)  39  Vitamin D deficiency (268 9) (E55 9)  40   Weight gain (783 1) (R63 5)    Past Medical History  1  History of A-fib (427 31) (I48 91)  2  History of Ankle fracture (824 8) (S82 899A)  3  History of Ankle swelling, left (719 07) (M25 472)  4  Anxiety (300 00) (F41 9)  5  History of Brain mass (348 9) (G93 9)  6  History of Cataract (366 9) (H26 9)  7  History of Chest discomfort (786 59) (R07 89)  8  History of Diplopia (368 2) (H53 2)  9  History of Dysphagia, pharyngoesophageal phase (787 24) (R13 14)  10  History of Dyspnea (786 09) (R06 00)  11  History of Esophageal Perforation (530 4)  12  History of Fracture, shoulder (812 00) (S42 90XA)  13  History of Gastroduodenitis (535 50)  14  History of anemia (V12 3) (Z86 2)  15  History of chest pain (V13 89) (Z87 898)  16  History of fatigue (V13 89) (Z87 898)  17  History of gastritis (V12 79) (Z87 19)  18  History of Hyperlipidemia (272 4) (E78 5)  19  History of Hypertension (401 9) (I10)  20  Hypothyroidism (244 9) (E03 9)  21  History of Left heart failure (428 1) (I50 1)  22  History of Lightheadedness (780 4) (R42)  23  History of 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227 3) (D35 2)  24  History of Miscarriage (634 90) (O03 9)  25  History of Myalgia And Myositis (729 1)  26  History of Need for pneumococcal vaccination (V03 82) (Z23)  27  History of Pain, knee (719 46) (M25 569)  28  Pituitary adenoma (227 3) (D35 2)  29  History of Pulse irregularity (785 9) (R09 89)  30  History of Renal failure (586) (N19)  31  History of Wrist fracture (814 00) (S62 109A)  Active Problems And Past Medical History Reviewed: The active problems and past medical history were reviewed and updated today  Surgical History  1  History of Appendectomy  2  History of Cataract Surgery  3  History of Cath Stent Placement  4  History of Diagnostic Esophagogastroduodenoscopy  5  History of Dilation And Curettage  6  History of Percutaneous Vertebral Augmentation Kyphoplasty  7  History of Tonsillectomy  8   History of Total Abdominal Hysterectomy With Removal Of Both Ovaries  Surgical History Reviewed: The surgical history was reviewed and updated today  Family History  Mother   1  Family history of   2  Family history of Osteoporosis (V17 81)  3  Family history of Sepsis  Father   4  Family history of Cerebellar Hemorrhage  5  Family history of   6  Family history of Hemorrhage in the brain  7  Family history of Hypertension (V17 49)  Sister   6  Family history of Thyroid Disorder (V18 19)  Maternal Grandmother   9  Family history of Colon Cancer (V16 0)  10  Family history of   Paternal Grandmother   6  Family history of Carcinoma Of The Stomach (V16 0)  12  Family history of   15  Family history of Stomach cancer  Maternal Grandfather   15  Family history of Acute Myocardial Infarction (V17 3)  15  Family history of   Paternal Grandfather   12  Family history of   16  Family history of Urinary retention  Paternal Uncle   25  Family history of Stroke Syndrome (V17 1)  Family History Reviewed: The family history was reviewed and updated today  Social History     · Alcohol Use (History)   · Being A Social Drinker   · Completed technical school   · Drinks wine (V49 89) (Z78 9)   · Functioning activity level   · Lives independently   · Marital History -    · Never a smoker   · Never A Smoker   · Never Used Drugs   · No drug use   · Occupation: Retired  Social History Reviewed: The social history was reviewed and updated today  Current Meds  1  AmLODIPine Besylate 5 MG Oral Tablet; TAKE 1 TABLET BY MOUTH DAILY IN THE MORNING; Therapy: 2014 to (Last Rx:05Jcm3203)  Requested for: 69Cwe4766 Ordered  2  Butalbital-APAP-Caffeine -40 MG Oral Tablet; TAKE 1 TABLET BY MOUTH TWICE A DAY AS NEEDED; Therapy: 14TJZ7632 to (Shabana Andujar)  Requested for: 80SUG3561; Last Rx:2017 Ordered  3  Cabergoline 0 5 MG Oral Tablet; take 1 tablet every day;  Therapy: 96GSM9830 to (Evaluate:69Jjy4226) Requested for: 90NEJ7824; Last Rx:69Dvx0850 Ordered  4  Eliquis 5 MG Oral Tablet; Take 1 tablet twice daily; Therapy: 33LZG9604 to (Last Rx:14Nov2017) Ordered  5  Levothyroxine Sodium 25 MCG Oral Tablet; take 1 tablet by mouth every day; Therapy: 42RON6529 to (Evaluate:19Oct2017)  Requested for: 65Ipf2078; Last Rx:95Yrp6308 Ordered  6  Lisinopril 5 MG Oral Tablet; take 1 tablet by mouth every day; Therapy: 45KUO4109 to (Last Rx:70Axo0763)  Requested for: 70Hbw6737 Ordered  7  LORazepam 1 MG Oral Tablet; take 1 tablet twice a day; Therapy: 43OAJ4176 to (Daya Killian)  Requested for: 48PUT9626; Last Rx:05Oct2017 Ordered  8  Metoprolol Tartrate 50 MG Oral Tablet; take 1 tablet by mouth twice a day; Therapy: 00MAU8024 to (Evaluate:17Sep2017)  Requested for: 72NNC2159; Last Rx:39Owc6977 Ordered  9  Midodrine HCl - 2 5 MG Oral Tablet; take 1 tablet by mouth twice a day; Therapy: 22LRR6838 to (Evaluate:02Jan2018)  Requested for: 62Dpl0862; Last Rx:97Cyw4600 Ordered  10  Omeprazole 20 MG Oral Capsule Delayed Release; take 1 capsule twice a day; Therapy: 25ILQ8155 to (Last Rx:09Nov2017)  Requested for: 35NXA0942 Ordered  11  Simvastatin 20 MG Oral Tablet; take 1 tablet daily at bedtime; Therapy: 90RVM0145 to [phone])  Requested for: 08HUU7027; Last Rx:24Mar2017  Ordered  12  Vitamin D 2000 UNIT Oral Tablet; Take 1 tablet daily; Therapy: 56CPV7020 to (Last Rx:46Vnt2077) Ordered  Medication List Reviewed: The medication list was reviewed and updated today  Allergies  1  Keflex TABS  2  Cephalosporins  3  No Known Environmental Allergies  4  No Known Food Allergies  Denied   5  Anesthesia Extension Tubing Miscellaneous    Vitals  Vital Signs    Recorded: 20Nov2017 12:00PM Recorded: 33YZI1686 11:26AM   Heart Rate 1 68   Systolic 7969 279   Diastolic 011 74   Height 1 5 ft 3 in   Weight 1 177 lb 4 oz   BMI Calculated 1 31 4   BSA Calculated 1 1 84   O2 Saturation 1 98        1   Amended Ivana Chavez, Soraya Thacker; Nov 20 2017 12:00 PM EST   Physical Exam   Constitutional  General appearance: No acute distress, well appearing and well nourished  Eyes  Conjunctiva and lids: No swelling, erythema or discharge  Pupils and irises: Equal, round and reactive to light  Ears, Nose, Mouth, and Throat  External inspection of ears and nose: Normal    Otoscopic examination: Tympanic membranes translucent with normal light reflex  Canals patent without erythema  Nasal mucosa, septum, and turbinates: Normal without edema or erythema  Oropharynx: Normal with no erythema, edema, exudate or lesions  Pulmonary  Respiratory effort: No increased work of breathing or signs of respiratory distress  Auscultation of lungs: Clear to auscultation  Cardiovascular  Palpation of heart: Normal PMI, no thrills  Auscultation of heart: Normal rate and rhythm, normal S1 and S2, without murmurs  Examination of extremities for edema and/or varicosities: Normal    Carotid pulses: Normal    Abdomen  Abdomen: Non-tender, no masses  Liver and spleen: No hepatomegaly or splenomegaly  Lymphatic  Palpation of lymph nodes in neck: No lymphadenopathy  Musculoskeletal  Gait and station: Normal    Digits and nails: Normal without clubbing or cyanosis  Inspection/palpation of joints, bones, and muscles: Normal    Skin  Skin and subcutaneous tissue: Normal without rashes or lesions  Neurologic  Cranial nerves: Cranial nerves 2-12 intact  Reflexes: 2+ and symmetric  Sensation: No sensory loss  Psychiatric  Orientation to person, place, and time: Normal    Mood and affect: Normal          Health Management  Health Maintenance   *VB - Fall Risk Assessment  (Dx Z13 89 Screen for Neurologic Disorder); every 1 year; [id number]; Next Due: 80Mil2623;  Overdue    Future Appointments    Date/Time Provider Specialty Site   12/19/2017 01:55 PM Trudy Severino MD Neurology [facility]   12/05/2017 01:00 PM Aaron Yaneli Peguero, [facility]   Electronically signed by : SIMON Hope ; Nov 20 2017 11:55AM EST                       (Author)    Electronically signed by : SIMON Hope ; Nov 20 2017 12:23PM EST                       (Author)    Electronically signed by : SIMON Hope ; Nov 20 2017 12:23PM EST                       (Author)

## 2017-11-22 ENCOUNTER — GENERIC CONVERSION - ENCOUNTER (OUTPATIENT)
Dept: OTHER | Facility: OTHER | Age: 72
End: 2017-11-22

## 2017-11-22 LAB
ALP BONE CFR SERPL: 25 % (ref 14–68)
ALP INTEST CFR SERPL: 0 % (ref 0–18)
ALP LIVER CFR SERPL: 75 % (ref 18–85)
ALP SERPL-CCNC: 136 IU/L (ref 39–117)

## 2017-11-28 ENCOUNTER — HOSPITAL ENCOUNTER (OUTPATIENT)
Dept: ULTRASOUND IMAGING | Facility: CLINIC | Age: 72
Discharge: HOME | End: 2017-11-28
Payer: MEDICARE

## 2017-11-28 DIAGNOSIS — R74.8 ABNORMAL LEVELS OF OTHER SERUM ENZYMES: ICD-10-CM

## 2017-11-28 PROCEDURE — 76700 US EXAM ABDOM COMPLETE: CPT

## 2017-11-30 ENCOUNTER — GENERIC CONVERSION - ENCOUNTER (OUTPATIENT)
Dept: OTHER | Facility: OTHER | Age: 72
End: 2017-11-30

## 2017-12-05 ENCOUNTER — GENERIC CONVERSION - ENCOUNTER (OUTPATIENT)
Dept: OTHER | Facility: OTHER | Age: 72
End: 2017-12-05

## 2017-12-15 ENCOUNTER — HOSPITAL ENCOUNTER (OUTPATIENT)
Dept: MRI IMAGING | Facility: CLINIC | Age: 72
Discharge: HOME | End: 2017-12-15
Payer: MEDICARE

## 2017-12-15 DIAGNOSIS — E22.9 HYPERFUNCTION OF PITUITARY GLAND (HCC): ICD-10-CM

## 2017-12-15 DIAGNOSIS — D35.2 BENIGN NEOPLASM OF PITUITARY GLAND (HCC): ICD-10-CM

## 2017-12-15 PROCEDURE — A9585 GADOBUTROL INJECTION: HCPCS | Performed by: PSYCHIATRY & NEUROLOGY

## 2017-12-15 PROCEDURE — 70553 MRI BRAIN STEM W/O & W/DYE: CPT

## 2017-12-15 RX ADMIN — GADOBUTROL 8 ML: 604.72 INJECTION INTRAVENOUS at 08:29

## 2018-01-10 ENCOUNTER — GENERIC CONVERSION - ENCOUNTER (OUTPATIENT)
Dept: OTHER | Facility: OTHER | Age: 73
End: 2018-01-10

## 2018-01-10 ENCOUNTER — APPOINTMENT (OUTPATIENT)
Dept: LAB | Facility: CLINIC | Age: 73
End: 2018-01-10
Payer: MEDICARE

## 2018-01-10 ENCOUNTER — HOSPITAL ENCOUNTER (OUTPATIENT)
Dept: CT IMAGING | Facility: CLINIC | Age: 73
Discharge: HOME | End: 2018-01-10
Payer: MEDICARE

## 2018-01-10 ENCOUNTER — TRANSCRIBE ORDERS (OUTPATIENT)
Dept: MRI IMAGING | Facility: CLINIC | Age: 73
End: 2018-01-10

## 2018-01-10 DIAGNOSIS — D44.4 NEOPLASM OF UNCERTAIN BEHAVIOR OF PITUITARY GLAND AND CRANIOPHARYNGEAL DUCT (HCC): ICD-10-CM

## 2018-01-10 DIAGNOSIS — D44.3 NEOPLASM OF UNCERTAIN BEHAVIOR OF PITUITARY GLAND AND CRANIOPHARYNGEAL DUCT (HCC): ICD-10-CM

## 2018-01-10 DIAGNOSIS — D44.4 NEOPLASM OF UNCERTAIN BEHAVIOR OF PITUITARY GLAND AND CRANIOPHARYNGEAL DUCT (HCC): Primary | ICD-10-CM

## 2018-01-10 DIAGNOSIS — N28.1 ACQUIRED CYST OF KIDNEY: ICD-10-CM

## 2018-01-10 DIAGNOSIS — D44.3 NEOPLASM OF UNCERTAIN BEHAVIOR OF PITUITARY GLAND AND CRANIOPHARYNGEAL DUCT (HCC): Primary | ICD-10-CM

## 2018-01-10 PROCEDURE — 83519 RIA NONANTIBODY: CPT

## 2018-01-10 PROCEDURE — 83003 ASSAY GROWTH HORMONE (HGH): CPT

## 2018-01-10 PROCEDURE — 36415 COLL VENOUS BLD VENIPUNCTURE: CPT

## 2018-01-10 PROCEDURE — 74178 CT ABD&PLV WO CNTR FLWD CNTR: CPT

## 2018-01-10 RX ADMIN — IOHEXOL 100 ML: 350 INJECTION, SOLUTION INTRAVENOUS at 11:50

## 2018-01-11 LAB
GH SERPL-MCNC: 2.4 NG/ML (ref 0–10)
IGF BP3 SERPL-MCNC: 2328 UG/L (ref 2005–5549)

## 2018-01-11 RX ORDER — BUTALBITAL/ACETAMINOPHEN 50MG-325MG
1 TABLET ORAL 2 TIMES DAILY PRN
COMMUNITY
End: 2018-02-01 | Stop reason: SDUPTHER

## 2018-01-11 RX ORDER — LORAZEPAM 1 MG/1
0.5 TABLET ORAL EVERY 8 HOURS PRN
COMMUNITY
End: 2018-02-28 | Stop reason: SDUPTHER

## 2018-01-11 RX ORDER — LISINOPRIL 5 MG/1
5 TABLET ORAL DAILY
COMMUNITY
End: 2018-07-08 | Stop reason: SDUPTHER

## 2018-01-11 RX ORDER — MIDODRINE HYDROCHLORIDE 2.5 MG/1
2.5 TABLET ORAL 2 TIMES DAILY
COMMUNITY
End: 2018-04-09 | Stop reason: SDUPTHER

## 2018-01-11 RX ORDER — LEVOTHYROXINE SODIUM 0.03 MG/1
25 TABLET ORAL DAILY
COMMUNITY
End: 2018-03-09 | Stop reason: SDUPTHER

## 2018-01-11 RX ORDER — OMEPRAZOLE 20 MG/1
20 CAPSULE, DELAYED RELEASE ORAL DAILY
COMMUNITY
End: 2018-12-06 | Stop reason: ALTCHOICE

## 2018-01-11 RX ORDER — SIMVASTATIN 20 MG
20 TABLET ORAL
COMMUNITY
End: 2018-03-23 | Stop reason: SDUPTHER

## 2018-01-11 RX ORDER — CHOLECALCIFEROL (VITAMIN D3) 50 MCG
2000 TABLET ORAL DAILY
COMMUNITY
End: 2018-04-24 | Stop reason: SDUPTHER

## 2018-01-11 RX ORDER — CABERGOLINE 0.5 MG/1
0.25 TABLET ORAL ONCE
COMMUNITY
End: 2018-12-06 | Stop reason: ALTCHOICE

## 2018-01-11 NOTE — RESULT NOTES
Message   pls discuss results, lab work, perform clinical exam, and plan of care in office visit with Dietxu Katarzyna  She is overdue        Verified Results  (1) CORTISOL AM SPECIMEN 80ZEA8843 07:44AM Eric Dickson   TW Order Number: [id number]     Test Name Result Flag Reference   CORTISO AM SPEC 24 2 ug/mL H 4 2-22 4   Reference ranges established for specimens drawn between 7 and 9 am  Results may be inaccurate if timing is not correct       (1) SPECIAL TEST 61RFY2139 09:12AM Eric Dickson     Test Name Result Flag Reference   TEST RESULT      SEE WRITTEN REPORT FROM [facility]

## 2018-01-11 NOTE — RESULT NOTES
Discussion/Summary   Normal, continue current regimen  Verified Results  (1) PROLACTIN 32QBD1756 11:39AM Adriana Melissa    Order Number: [id number]     Test Name Result Flag Reference   PROLACTIN 15 2 ng/mL     PROLACTIN:     Females:   ? ??Non-pregnant ??? ???2 2-30 3 ? ? ?ng/mL   ? ??Pregnant ??? ??? ??? ???8 1-347 6 ng/mL   ? ? ?Post-menopausal 0 7-31 5 ? ??ng/mL

## 2018-01-12 VITALS
SYSTOLIC BLOOD PRESSURE: 132 MMHG | BODY MASS INDEX: 30.68 KG/M2 | HEIGHT: 65 IN | HEART RATE: 98 BPM | WEIGHT: 184.13 LBS | DIASTOLIC BLOOD PRESSURE: 76 MMHG

## 2018-01-12 NOTE — MISCELLANEOUS
Message  will increase cabergoline to 0 5 mg take 5 days a week and increase to 6 tabs a week after 2 weeks and then 7 tabs a week after another 2 weeks  check prolactin level in 6 weeks  MRI pituitary before next visit in July  For any worse neurological symptoms or tumor size, will refer to Onc and rad onc for evaluation if she should be considered for Temozolomide or stereotactic radiation as a possibility        Signatures   Electronically signed by : SIMON Marsh ; Apr 27 2017 12:14PM EST                       (Author)

## 2018-01-12 NOTE — RESULT NOTES
Message   MRI is reporting stable pituitary tumor with no worsening  Let us know if you have new problems such as worsening headaches or vision issues  Verified Results  * MRI BRAIN PITUITARY WO AND W CONTRAST [id number] 10:52AM Lizbeth [facility] Order Number: [id number]    - Patient Instructions: To schedule this appointment, please contact Central Scheduling at [phone]  Test Name Result Flag Reference   MRI BRAIN PITUITARY WO AND W CONTRAST (Report)     MRI BRAIN AND SELLA WITH AND WITHOUT CONTRAST     INDICATION:  Increasing headaches and loss of balance  History of pituitary macroadenoma  COMPARISON: MRI performed on 4/19/2016     TECHNIQUE:   Brain: Sagittal T1, axial T2  Axial FLAIR  Axial T1, Axial [place], Axial DWI  Axial T1 post contrast   Axial BRAVO post contrast       Sella: Sagittal T1, Coronal T1 pre-and-post contrast, coronal post contrast dynamic imaging  Coronal T2  Sagittal T1 post contrast    Targeted images of the sella were performed requiring additional time at acquisition and interpretation of approximately 25%   8 mL of Gadavist was injected intravenously without immediate consequence  IMAGE QUALITY: Diagnostic  FINDINGS:   SELLA AND PITUITARY GLAND: Again noted is the heterogeneous and somewhat hypoenhancing mass involving much of the left sella and extending into the left cavernous sinus in keeping with pituitary macroadenoma  There is likely an element of left    cavernous sinus encasement  In widest transverse dimension, it measures 1 2 cm, widest AP dimension it measures approximately 1 9 cm and widest craniocaudal extension of approximately 1 3 cm  Overall dimensions are unchanged from prior examination  Only marginal suprasellar extension is appreciated  More of the presumed pituitary lies along the right lateral margin of the sella with rightward infundibular deviation  The optic chiasm appears spared without any adjacent mass effect   No definite involvement of the optic nerve sheath complexes  BRAIN PARENCHYMA: There are no areas of restricted diffusion  No midline shift, mass effect, or extra-axial collection is identified  No areas of gradient susceptibility to suggest blood product deposition  Scattered patchy areas of increased T2 and FLAIR signal are present in the supratentorial white matter which is a nonspecific finding but likely represents mild chronic microvascular ischemia  Mild generalized volume loss noted  No signs of pathologic enhancement within the parenchyma or meninges  VENTRICLES: Ventricles are commensurate with the degree of volume loss  ORBITS: Changes of bilateral lens replacements noted  PARANASAL SINUSES:  Polypoid mucosal thickening throughout the ethmoid air cells and maxillary sinus  Trace mucosal thickening in the sphenoid sinuses  VASCULATURE: Evaluation of the major intracranial vasculature demonstrates appropriate flow voids  CALVARIUM AND SKULL BASE: Normal      EXTRACRANIAL SOFT TISSUES: Normal        IMPRESSION:     Stable MRI when compared to 4/19/2016  No acute intracranial abnormality or pathologic enhancement  Nonspecific white matter changes suggestive of mild chronic microvascular ischemia  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

## 2018-01-13 VITALS
OXYGEN SATURATION: 97 % | BODY MASS INDEX: 30.32 KG/M2 | SYSTOLIC BLOOD PRESSURE: 142 MMHG | HEART RATE: 68 BPM | DIASTOLIC BLOOD PRESSURE: 76 MMHG | WEIGHT: 182 LBS | HEIGHT: 65 IN

## 2018-01-13 NOTE — MISCELLANEOUS
Assessment    1  Chest discomfort (588 59) (R07 89)   2  Dyspnea (786 09) (R06 00)    Plan  Anxiety    · LORazepam 1 MG Oral Tablet; take 1 tablet twice a day as needed   Rx By: Lurdes Jensen; Dispense: 30 Days ; #:60 Tablet; Refill: 0; For: Anxiety; THAO = N; Call Rx  Lumbago With Sciatica    · Gabapentin 300 MG Oral Capsule; Take one capsule twice a day   Rx By: Lurdes Jensen; Dispense: 90 Days ; #:180 Capsule; Refill: 3; For: Lumbago With Sciatica; THAO = N; Verified Transmission to [facility] Last Updated By: System, [employer]; 1/25/2016 2:24:35 PM    Discussion/Summary  Discussion Summary:   Patient has follow up with Dr Sola Gonzalez next week  She declined PFT which was offered again to rule out any pulmonary causes  Symptoms could likely be due to her weight gain and decreased exercise tolerance along with her A Fib  She plans to start walking more once the weather is warmer  Chief Complaint  Chief Complaint Free Text Note Form: Hospital follow up  History of Present Illness  TCM Communication [facility]: The patient is being contacted for follow-up after hospitalization  [place] [facility] [place] date of [place]: 1/20/16  She was discharged to home  Medications reviewed and updated today  Communication performed and completed by   HPI: Patient here for hospital follow up from cardiac cath on 1/20/16  She underwent elective cardiac cath due to persistent shortness of breath, chest pain and jaw pain with exertion  Noticed most when walking stairs  Her cardiac cath was negative  She does still experience symptoms during exertion  She has put on 40 lbs over the last couple years  Review of Systems  Complete-Female:   Constitutional: No fever, no chills, feels well, no tiredness, no recent weight gain or weight loss  Cardiovascular: as noted in HPI  Respiratory: as noted in HPI     Gastrointestinal: No complaints of abdominal pain, no constipation, no nausea or vomiting, no diarrhea, no bloody stools  Genitourinary: No complaints of dysuria, no incontinence, no pelvic pain, no dysmenorrhea, no vaginal discharge or bleeding  Musculoskeletal: No complaints of arthralgias, no myalgias, no joint swelling or stiffness, no limb pain or swelling  Neurological: No complaints of headache, no confusion, no convulsions, no numbness, no dizziness or fainting, no tingling, no limb weakness, no difficulty walking  Active Problems    1  Abnormal brain MRI (793 0) (R93 0)   2  A-fib (427 31) (I48 91)   3  Anemia (285 9) (D64 9)   4  Ankle swelling, left (719 07) (M25 472)   5  Anticoagulated by anticoagulation treatment (V58 61) (Z79 01)   6  Anxiety (300 00) (F41 9)   7  Benign essential hypertension (401 1) (I10)   8  Blepharoptosis (374 30) (H02 409)   9  Chest discomfort (786 59) (R07 89)   10  Depression (311) (F32 9)   11  Dysphagia, pharyngoesophageal phase (787 24) (R13 14)   12  Dyspnea (786 09) (R06 00)   13  Elevated prolactin level (259 9) (E22 9)   14  Encounter for monitoring long-term proton pump inhibitor therapy (V58 83,V58 69)    (Z51 81,Z79 899)   15  Esophageal ring, acquired (530 3) (K22 2)   16  Fatigue (780 79) (R53 83)   17  Fibrocystic breast disease (610 1) (N60 19)   18  Headache (784 0) (R51)   19  Hypercholesterolemia (272 0) (E78 0)   20  Hyperprolactinemia (253 1) (E22 1)   21  Hypotension (458 9) (I95 9)   22  Hypothyroidism (244 9) (E03 9)   23  Lightheadedness (780 4) (R42)   24  Loss of smell (781 1) (R43 0)   25  Lumbago With Sciatica (724 3)   26  Myalgia And Myositis (729 1)   27  Need for pneumococcal vaccination (V03 82) (Z23)   28  Obesity (278 00) (E66 9)   29  Osteoporosis (733 00) (M81 0)   30  Pain, knee (719 46) (M25 569)   31  Pituitary adenoma (227 3) (D35 2)   32  Postural hypotension (458 0) (I95 1)   33  Preoperative evaluation to rule out surgical contraindication (V72 83) (Z01 818)   34   Pulse irregularity (785 9) (R09 89) 28  Strain of thoracic region (847 1) (S29 012A)   36  Syncope (780 2) (R55)   37  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)   38  Unstable balance (781 2) (R26 89)   39  Visual disturbances (368 9) (H53 9)   40  Vitamin D deficiency (268 9) (E55 9)   41  Weight gain (783 1) (R63 5)    Past Medical History    1  History of A-fib (427 31) (I48 91)   2  History of Ankle fracture (824 8) (S82 899A)   3  Anxiety (300 00) (F41 9)   4  History of Brain mass (348 9) (G93 9)   5  History of Cataract (366 9) (H26 9)   6  History of Diplopia (368 2) (H53 2)   7  History of Esophageal Perforation (530 4)   8  History of Fracture, shoulder (812 00) (S42 90XA)   9  History of Gastroduodenitis (535 50)   10  History of chest pain (V13 89) (Z87 898)   11  History of gastritis (V12 79) (Z87 19)   12  History of Hyperlipidemia (272 4) (E78 5)   13  History of Hypertension (401 9) (I10)   14  Hypothyroidism (244 9) (E03 9)   15  History of Left heart failure (428 1) (I50 1)   16  History of 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227 3) (D35 2)   17  History of Miscarriage (634 90) (O03 9)   18  Need for pneumococcal vaccination (V03 82) (Z23)   19  Pituitary adenoma (227 3) (D35 2)   20  History of Renal failure (586) (N19)   21  History of Wrist fracture (814 00) (S62 109A)    Surgical History    1  History of Appendectomy   2  History of Cataract Surgery   3  History of Diagnostic Esophagogastroduodenoscopy   4  History of Dilation And Curettage   5  History of Percutaneous Vertebral Augmentation Kyphoplasty   6  History of Tonsillectomy   7  History of Total Abdominal Hysterectomy With Removal Of Both Ovaries    Family History    1  Family history of    2  Family history of Osteoporosis (V17 81)   3  Family history of Sepsis    4  Family history of Cerebellar Hemorrhage   5  Family history of    6  Family history of Hemorrhage in the brain   7  Family history of Hypertension (V17 49)    8   Family history of Thyroid Disorder (V18 19)    9  Family history of Colon Cancer (V16 0)   10  Family history of     6  Family history of Carcinoma Of The Stomach (V16 0)   12  Family history of    15  Family history of Stomach cancer    14  Family history of Acute Myocardial Infarction (V17 3)   15  Family history of     12  Family history of    16  Family history of Urinary retention    18  Family history of Stroke Syndrome (V17 1)    Social History    · Alcohol Use (History)   · Being A Social Drinker   · Completed technical school   · Drinks wine (V49 89) (Z78 9)   · Functioning activity level   · Lives independently   · Marital History -    · Never A Smoker   · Never Used Drugs   · No drug use   · Occupation: Retired    Current Meds   1  AmLODIPine Besylate 5 MG Oral Tablet; TAKE 1 TABLET BY MOUTH DAILY IN THE   MORNING; Therapy: 34Tyb7456 to (Evaluate:2016)  Requested for: 23WWA0465; Last   Rx:72Dya5169 Ordered   2  Butalbital-APAP-Caffeine -40 MG Oral Tablet; TAKE 1 TABLET Twice daily PRN; Therapy: 16MRQ2433 to (Evaluate:2016)  Requested for: 98CUG6085; Last   Rx:37Hdv1720 Ordered   3  Cabergoline 0 5 MG Oral Tablet; Take 2 tablets twice per week as directed; Therapy: 04HWN9626 to (Ayaka Gutierres)  Requested for: 00REB1941; Last   Rx:32Bui4970 Ordered   4  Gabapentin 300 MG Oral Capsule; Take one capsule twice a day; Therapy: 88Sbe2979 to (Markus Morris)  Requested for: 26GOE4631; Last   Rx:24Kcf4683 Ordered   5  Levothyroxine Sodium 25 MCG Oral Tablet; Take 1 tablet daily; Therapy: 11NQE9115 to (Last Rx:65Hys8073)  Requested for: 37Eiz6835 Ordered   6  Lisinopril 5 MG Oral Tablet; TAKE 1 TABLET DAILY; Therapy: 36CQP4853 to (Evaluate:2016)  Requested for: 66QFB3469; Last   Rx:48Vwj4305 Ordered   7  LORazepam 1 MG Oral Tablet; take 1 tablet twice a day as needed; Therapy: 56KJY5593 to (Evaluate:2016)  Requested for: 18IOB8013;  Last   Rx:55Kfo9618 Ordered 8  Metoprolol Tartrate 50 MG Oral Tablet; Take 1 tablet twice daily; Therapy: 28SLE2612 to (Jennifer Fonseca)  Requested for: 67LGD1759; Last   Rx:09Pyd6877 Ordered   9  Midodrine HCl - 2 5 MG Oral Tablet; take 1 tablet by mouth twice a day; Therapy: 67GXX6106 to (Evaluate:47Zet1483)  Requested for: 22QAV0690; Last   Rx:88Hin2085 Ordered   10  Omeprazole 20 MG Oral Capsule Delayed Release; TAKE ONE CAPSULE BY MOUTH IN    THE MORNING 30 MINUTES BEFORE BREAKFAST; Therapy: 10LTG9017 to (Maria Guadalupe Bird)  Requested for: 37CVQ8562; Last    A34ZJJ6125 Ordered   6  Oxycodone-Acetaminophen 5-325 MG Oral Tablet; TAKE 1 TABLET Every 6 hours PRN; Therapy: 84ZPW3484 to (Evaluate:2016); Last Rx:24Nij9917 Ordered   12  Simvastatin 20 MG Oral Tablet; TAKE 1 TABLET DAILY AT BEDTIME; Therapy: 66HMW1929 to (Evaluate:2016)  Requested for: 63MFT8964; Last    Rx:47Zhd5461 Ordered   13  Vitamin D (Ergocalciferol) 66101 UNIT Oral Capsule; TAKE ONE CAPSULE BY MOUTH    EVERY WEEK; Therapy: 21NGL3386 to )  Requested for: 29HXH0214; Last    Rx:43Ucp5948 Ordered   14  Xarelto 20 MG Oral Tablet; take 1 tablet by mouth daily; Therapy: 82FYY1381 to (Roshni Elizabeth)  Requested for: ; Last    Rx:2015 Ordered  Medication List Reviewed: The medication list was reviewed and updated today  Allergies    1  Keflex TABS   2  Cephalosporins  Denied    3  Anesthesia Extension Tubing Miscellaneous    Vitals  Signs [Data Includes: Current Encounter]   Recorded: 27HZI0813 02:00PM   Temperature: 98 3 F  Heart Rate: 72  Systolic: 357  Diastolic: 78  Height: 5 ft 3 5 in  Weight: 176 lb 6 08 oz  BMI Calculated: 30 75  BSA Calculated: 1 84  O2 Saturation: 98    Physical Exam    Constitutional   General appearance: No acute distress, well appearing and well nourished  Pulmonary   Respiratory effort: No increased work of breathing or signs of respiratory distress      Auscultation of lungs: Clear to auscultation  Cardiovascular   Auscultation of heart: Abnormal   The rhythm was irregularly irregular  Examination of extremities for edema and/or varicosities: Normal     Abdomen   Abdomen: Non-tender, no masses  Musculoskeletal   Gait and station: Normal     Psychiatric   Orientation to person, place, and time: Normal     Mood and affect: Normal          Provider Comments  Provider Comments:   Cardiac cath: Normal left ventricular systolic function  EF 55-60%  Angiographically normal coronary arteries  Health Management  Health Maintenance   *VB - Fall Risk Assessment  (Dx V80 09 Screen for Neurologic Disorder); every 1 year; Last  21Apr2014; Next Due: 21Apr2015; Overdue    Future Appointments    Date/Time Provider Specialty Site   04/13/2016 12:50 PM SIMON Phelan  Endocrinology [facility] ENDOCRINOLOGY   05/02/2016 01:55 PM Davian Jin MD Neurology [facility] [address]   05/02/2016 04:15 PM SIMON Krause  [facility]   02/05/2016 01:30 PM SIMON Butler   Cardiology [facility] 391     Signatures   Electronically signed by : Jaquan Kidd, [facility]; Jan 25 2016  3:40PM EST                       (Author)    Electronically signed by : Chantal Ivy DO; Jan 25 2016  4:03PM EST                       (Co-author)

## 2018-01-13 NOTE — RESULT NOTES
Discussion/Summary   Stable pituitary adenoma  Repeat in 1 year  Verified Results  * MRI BRAIN PITUITARY WO AND W CONTRAST 73OJQ3975 11:31AM Nalini [facility] Order Number: [id number]    - Patient Instructions: To schedule this appointment, please contact Central Scheduling at [phone]  Test Name Result Flag Reference   MRI BRAIN PITUITARY WO AND W CONTRAST (Report)     MRI BRAIN AND SELLA WITH AND WITHOUT CONTRAST     INDICATION: Pituitary macroadenoma  Worsening headaches and balance disturbance  COMPARISON: 11/1/2016 is the most recent of numerous previous examinations  TECHNIQUE:   Brain: Sagittal T1, axial T2  Axial FLAIR  Axial T1, Axial [place], Axial DWI  Axial T1 post contrast   Axial BRAVO post contrast       Sella: Sagittal T1, Coronal T1 pre-and-post contrast, Coronal T2  Sagittal T1 post contrast    Targeted images of the sella were performed requiring additional time at acquisition and interpretation of approximately 25%     IV Contrast: 8 mL of Gadobutrol injection (SINGLE-DOSE)      IMAGE QUALITY: Diagnostic  FINDINGS:     BRAIN PARENCHYMA: Mild cerebral volume loss  Mild periventricular white matter hyperintensity on T2 and FLAIR imaging scattered within the cerebral hemispheres suggestive of chronic microangiopathic disease  Diffusion imaging is unremarkable with no    evidence of acute ischemia  Small dilated perivascular space versus old lacunar infarct within the right inferior basal ganglia  No evidence of mass or hemorrhage  Normal corpus callosum and hypothalamus  Postcontrast imaging is normal      VENTRICLES: Enlarged, consistent with the degree of atrophy and chronic microangiopathy  SELLA AND PITUITARY GLAND: Once again identified is an enlarged sella turcica   Within the left inferior lateral aspect of the pituitary gland, extending into the left cavernous sinus mainly below the cavernous internal carotid artery is heterogeneous    enhancing residual tumor which is grossly unchanged in size  There is no extension into Meckel's cave  Appropriate flow void of the left intracranial internal carotid artery  There is deviation of the pituitary stalk towards the right  No suprasellar extension of mass  No mass effect upon the optic chiasm  ORBITS: Normal      PARANASAL SINUSES: Mucosal thickening of the posterior aspect of the sphenoid sinus on the left  VASCULATURE: Evaluation of the major intracranial vasculature demonstrates appropriate flow voids  CALVARIUM AND SKULL BASE: Normal      EXTRACRANIAL SOFT TISSUES: Stable  IMPRESSION:     Stable examination of the brain parenchyma including cerebral volume loss and chronic microangiopathy  Stable pituitary macroadenoma within the left lateral aspect of the gland extending into the left cavernous sinus         Workstation performed: [id number]     Signed by:   Molly Curtis DO   7/5/17

## 2018-01-14 VITALS
BODY MASS INDEX: 31.54 KG/M2 | HEIGHT: 63 IN | DIASTOLIC BLOOD PRESSURE: 82 MMHG | SYSTOLIC BLOOD PRESSURE: 122 MMHG | HEART RATE: 71 BPM | WEIGHT: 178 LBS | OXYGEN SATURATION: 98 %

## 2018-01-14 VITALS
BODY MASS INDEX: 31.41 KG/M2 | SYSTOLIC BLOOD PRESSURE: 124 MMHG | HEART RATE: 68 BPM | DIASTOLIC BLOOD PRESSURE: 74 MMHG | WEIGHT: 177.25 LBS | HEIGHT: 63 IN | OXYGEN SATURATION: 98 %

## 2018-01-14 VITALS
HEART RATE: 60 BPM | SYSTOLIC BLOOD PRESSURE: 134 MMHG | DIASTOLIC BLOOD PRESSURE: 80 MMHG | BODY MASS INDEX: 31.18 KG/M2 | HEIGHT: 65 IN | WEIGHT: 187.13 LBS

## 2018-01-15 VITALS
BODY MASS INDEX: 30.37 KG/M2 | WEIGHT: 182.25 LBS | DIASTOLIC BLOOD PRESSURE: 88 MMHG | OXYGEN SATURATION: 98 % | HEIGHT: 65 IN | SYSTOLIC BLOOD PRESSURE: 144 MMHG | HEART RATE: 53 BPM | TEMPERATURE: 97.6 F

## 2018-01-15 VITALS
WEIGHT: 180 LBS | HEIGHT: 63 IN | HEART RATE: 54 BPM | BODY MASS INDEX: 31.89 KG/M2 | SYSTOLIC BLOOD PRESSURE: 108 MMHG | DIASTOLIC BLOOD PRESSURE: 70 MMHG

## 2018-01-15 NOTE — RESULT NOTES
Verified Results  (1) ALKALINE PHOSPHATASE ISOENZYMES 64Cnr6979 12:23PM Zahira Gibson Order Number: [id number]     Test Name Result Flag Reference   ALK PHOSPHATAS 136 IU/L H 39 - 117   BONE ISOENZYMES 25 %  14 - 68   INTEST ISOENZYM 0 %  0 - 18   Performed at:  [address] [place] [address]  [id number]  : Gil Porter MD, Phone:  [phone]  Performed at:  [address]  [id number]  : Opal Del Castillo MD, Phone:  [phone]   LIVER ISOENZ 75 %  18 - 85

## 2018-01-16 NOTE — RESULT NOTES
Message   Discuss at visit, please remind patient of upcoming appointment with Dr Megha Doan       Verified Results  (1) DHEA-S 02Aug2016 01:19PM [facility],   [address] Order Number: [id number]     Test Name Result Flag Reference   DHEA-SULFATE 15 1 ug/dL L 20 4 - 186  6   Performed at:  [address] [place] [address]  [id number]  : Pastora Montano MD, Phone:  [phone]     (1) THYROID MICROSOMAL ANTIBODY 02Aug2016 01:19PM [facility],   [address] Order Number: [id number]     Test Name Result Flag Reference   THY MICROSOM AB 11 IU/mL  0 - 34   Performed at:  [employer] [place] [address] [facility] [address]  [id number]  : Pastora Montano MD, Phone:  [phone]

## 2018-01-16 NOTE — RESULT NOTES
Message   Vitamin D level too high, discontinue Vitamin D supplements, repeat level in 3 months  TSH normal, continue current Levothyroxine dose  Discuss rest of labs in office, please keep follow-up appointment  Verified Results  (1) CORTISOL AM SPECIMEN 02Aug2016 01:19PM [employer]    Order Number: [id number]     Test Name Result Flag Reference   CORTISO AM SPEC 10 4 ug/mL  4 2-22 4   Reference ranges established for specimens drawn between 7 and 9 am  Results may be inaccurate if timing is not correct  (1) PTH N-TERMINAL (INTACT) 02Aug2016 01:19PM [employer]    Order Number: [id number]     Test Name Result Flag Reference   PARATHYROID HORMONE INTACT 67 5 pg/mL  14 0-72 0     (1) T4, FREE 02Aug2016 01:19PM [facility]    Order Number: [id number]     Test Name Result Flag Reference   T4,FREE 0 95 ng/dL  0 76-1 46     (1) TSH 02Aug2016 01:19PM [facility] Number: [id number]     Test Name Result Flag Reference   TSH 2 710 uIU/mL  0 358-3 740   Patients undergoing fluorescein dye angiography may retain small amounts of fluorescein in the body for 48-72 hours post procedure  Samples containing fluorescein can produce falsely depressed TSH values  If the patient had this procedure,a specimen should be resubmitted post fluorescein clearance  The recommended reference ranges for TSH during pregnancy are as follows:  First trimester 0 1 to 2 5 uIU/mL  Second trimester  0 2 to 3 0 uIU/mL  Third trimester 0 3 to 3 0 uIU/m     (1) RENAL FUNCTION PANEL 02Aug2016 01:19PM [employer]    Order Number: [id number]     Test Name Result Flag Reference   ANION GAP (CALC) 6 mmol/L  4-13   ALBUMIN 3 6 g/dL  3 5-5 0   BLOOD UREA NITROGEN 22 mg/dL  5-25   National Kidney Disease Education Program recommendations are as follows:  GFR calculation is accurate only with a steady state creatinine  Chronic Kidney disease less than 60 ml/min/1 73 sq  meters  Kidney failure less than 15 ml/min/1 73 sq  meters  CALCIUM 9 6 mg/dL  8 3-10 1   CHLORIDE 103 mmol/L  100-108   CARBON DIOXIDE 26 mmol/L  21-32   CREATININE 0 98 mg/dL  0 60-1 30   Standardized to IDMS reference method   GLUCOSE,RANDM 94 mg/dL     POTASSIUM 4 3 mmol/L  3 5-5 3   eGFR Non-African American 56 3 ml/min/1 73sq m     SODIUM 135 mmol/L L 136-145   PHOSPHORUS 3 7 mg/dL  2 3-4 1     (1) VITAMIN D 25-HYDROXY 02Aug2016 01:19PM Oliver Tovarwenceslao HOYOS Order Number: [id number]     Test Name Result Flag Reference   VIT D 25-HYDROX 105 6 ng/mL H 30 0-100 0   This assay is a certified procedure of the CDC Vitamin D Standardization Certification Program (VDSCP)     Deficiency <20ng/ml   Insufficiency 20-30ng/ml   Sufficient  ng/ml     *Patients undergoing fluorescein dye angiography may retain small amounts of fluorescein in the body for 48-72 hours post procedure  Samples containing fluorescein can produce falsely elevated Vitamin D values  If the patient had this procedure, a specimen should be resubmitted post fluorescein clearance  (1) LH (LEUTINIZING HORMONE) 02Aug2016 01:19PM Oliver Tovarwenceslao HOYOS Order Number: [id number]     Test Name Result Flag Reference   LUTEINIZING HORMONE 28 9 mIU/mL     LH:  Menstruating Females: Follicular Phase  2 9-78 4  mIU/mL    Mid-Cycle Phase   22 8-76 1 mIU/mL    Luteal Phase      0 6-13 5  mIU/mL  Postmenopausal Females: On Menopausal Hormone Therapy(MHT) 1 1-52 4 mIU/mL    Untreated                          8 6-61 8 mIU/mL     (1) [facility] 02Aug2016 01:19PM Oliver Tovarwenceslao HOYOS Order Number: [id number]     Test Name Result Flag Reference   FOLLICLE STIMULATING HORMONE 54 7 mIU/mL     FSH:  Menstruating Females:     Follicular Phase  9 8-51 9 mIU/mL    Mid-Cycle Phase   5 2-17 5 mIU/mL    Luteal Phase      1 7-9 5  mIU/mL  Postmenopausal Females    On Menopausal Hormone Therapy(HRT) 5 9-72 8   mIU/mL    Untreated 12 7-132 2 mIU/mL     (1) PROLACTIN 90Pyt2217 01:19PM Tamika Walker    Order Number: [id number]     Test Name Result Flag Reference   PROLACTIN 16 9 ng/mL     PROLACTIN:     Females:    Non-pregnant    2 2-30 3  ng/mL    Pregnant        8 1-347 6 ng/mL    Post-menopausal 0 7-31 5  ng/mL

## 2018-01-17 NOTE — RESULT NOTES
Verified Results  ECHO COMPLETE WITH CONTRAST IF INDICATED [id number] 01:41PM Vinod Mahoney Order Number: [id number]    - Patient Instructions: To schedule this appointment, please contact Central Scheduling at [phone]  Test Name Result Flag Reference   ECHO COMPLETE WITH CONTRAST IF INDICATED (Report)     [facility], [address] [place] [address]   [phone]     Transthoracic Echocardiogram   2D, M-mode, Doppler, and Color Doppler     Study date: 18-May-2017     Patient: Edvin King   MR number: [id number]   Account number: [de-identified]   : 1945   Age: 80 years   Gender: Female   Status: Outpatient   Location: [facility]   Height: 64 in   Weight: 182 lb   BP: 144/ 88 mmHg     Indications: Atrial fibrillation  Diagnoses: I48 0 - Atrial fibrillation     Sonographer: Lama RCS   Interpreting Physician: Nel Faye MD   Primary Physician: Winter Hu MD   Referring Physician: Nel Faye MD   Group: [facility]     SUMMARY     LEFT VENTRICLE:   Ejection fraction was estimated to be 55 %  There were no regional wall motion abnormalities  LEFT ATRIUM:   The atrium was mildly dilated  PERICARDIUM:   A small pericardial effusion was identified  HISTORY: PRIOR HISTORY: Risk factors: hypertension, insulin-controlled diabetes, oral hypoglycemic-treated diabetes, and medication-treated hypercholesterolemia  PRIOR PROCEDURES: Stent  PROCEDURE: The study was performed in the [address]  This was a routine study  The transthoracic approach was used  The study included complete 2D imaging, M-mode, complete spectral Doppler, and color Doppler  This   was a technically difficult study  LEFT VENTRICLE: Size was normal  Ejection fraction was estimated to be 55 %  There were no regional wall motion abnormalities   DOPPLER: Left ventricular diastolic function parameters were normal      RIGHT VENTRICLE: The size was normal  Systolic function was normal  Wall thickness was normal      LEFT ATRIUM: The atrium was mildly dilated  RIGHT ATRIUM: Size was normal      MITRAL VALVE: Valve structure was normal  There was normal leaflet separation  DOPPLER: The transmitral velocity was within the normal range  There was no evidence for stenosis  There was no regurgitation  AORTIC VALVE: The valve was trileaflet  Leaflets exhibited normal thickness and normal cuspal separation  DOPPLER: Transaortic velocity was within the normal range  There was no evidence for stenosis  There was no regurgitation  TRICUSPID VALVE: The valve structure was normal  There was normal leaflet separation  DOPPLER: The transtricuspid velocity was within the normal range  There was no evidence for stenosis  There was no regurgitation  PULMONIC VALVE: Leaflets exhibited normal thickness, no calcification, and normal cuspal separation  DOPPLER: The transpulmonic velocity was within the normal range  There was no regurgitation  PERICARDIUM: A small pericardial effusion was identified  AORTA: The root exhibited normal size  SYSTEM MEASUREMENT TABLES     Unspecified Scan Mode   Aortic Root Diameter; End Systole;: 27 9 mm   Left atrial diameter; End Diastole;: 41 7 mm   Cardiac Output; Teichholz; Systole;: 4 92 L/min   Heart rate; Teichholz;: 107 {H  B }/min   Interventricular Septum Diastolic Thickness; Teichholz; End Diastole;: 10 5 mm   Left Ventricle Internal End Diastolic Dimension; Teichholz;: 48 3 mm   Left Ventricle Internal Systolic Dimension; Teichholz; End Systole;: 38 3 mm   Left Ventricle Mass; Mass AVCube with Teichholz; End Diastole;: 166 g   Left Ventricle Posterior Wall Diastolic Thickness; Teichholz; End Diastole;: 9 mm   Left Ventricular Ejection Fraction; Teichholz;: 42 2 %   Left Ventricular End Diastolic Volume;  Teichholz;: 109 1 ml   Left Ventricular End Systolic Volume; Juan;: 63 1 ml   Left Ventricular Fractional Shortening;: 20 7 %   Stroke volume;  Manolo Kevin;: 46 ml     [facility] Accredited Echocardiography Laboratory     Prepared and electronically signed by     Lindsey Espinosa MD   Signed [id number] 15:28:59

## 2018-01-23 NOTE — RESULT NOTES
Verified Results  CT RENAL PROTOCOL [id number] 11:00AM Lilian Francois Order Number: [id number]    - Patient Instructions: To schedule this appointment, please contact Central Scheduling at [phone]  Test Name Result Flag Reference   CT RENAL PROTOCOL (Report)     CT ABDOMEN AND PELVIS WITH AND WITHOUT IV CONTRAST     INDICATION: Acquired cyst of kidney, abnormal finding on abdominal ultrasound  COMPARISON: Abdominal ultrasound 11/28/2017     TECHNIQUE: CT of the kidneys was performed without intravenous contrast  Dynamic postcontrast CT evaluation of the abdomen and pelvis was performed in both nephrographic and delayed phases after the administration of intravenous contrast  Reformatted    images were created in axial, sagittal, and coronal planes  Radiation dose length product (DLP) for this visit: 2862 5 mGy-cm   This examination, like all CT scans performed in the [facility], was performed utilizing techniques to minimize radiation dose exposure, including the use of iterative   reconstruction and automated exposure control  IV Contrast: 100 mL of iohexol (OMNIPAQUE)        Enteric Contrast: Not given     FINDINGS:     ABDOMEN     RIGHT KIDNEY AND URETER:   No solid renal mass  Subcentimeter hypodensity lower pole too small to characterize  No detectable ureteral mass  No hydronephrosis or hydroureter  No urinary tract calculi  No perinephric collection  LEFT KIDNEY AND URETER:   9 x 7 mm interpolar fat-containing lesion consistent with angiomyolipoma  Additionally, there is cortical scarring noted in the lateral left mid to lower pole  No solid mass seen  Probable tiny lateral cortical cyst in the midpole  No detectable ureteral mass  No hydronephrosis or hydroureter  No urinary tract calculi  No perinephric collection  Minor nonspecific perinephric edema       URINARY BLADDER:   Bladder is suboptimally distended with submucosal fat deposition suggested along the bladder walls  No bladder wall mass  No calculi  LUNG BASES: Mosaic attenuation more notable in the right lung suggesting small airway disease/air trapping  Subsegmental atelectasis noted in the left costophrenic angle  Small lung cysts are seen in the right middle and lower lobes  2 mm subpleural right lower lobe nodule  The heart is top normal size with small pericardial effusion noted  LIVER/BILIARY TREE: Unremarkable  GALLBLADDER: No calcified gallstones  No pericholecystic inflammatory change  SPLEEN: Unremarkable  PANCREAS: Mild fatty involution without acute findings  ADRENAL GLANDS: Unremarkable  STOMACH, BOWEL AND APPENDIX: Unremarkable  ABDOMINOPELVIC CAVITY: No ascites  Trace edema is seen along the left paracolic gutter  No free intraperitoneal air  No lymphadenopathy  VESSELS: Unremarkable for patient's age  PELVIS     REPRODUCTIVE ORGANS: Patient is status post hysterectomy  ABDOMINAL WALL/INGUINAL REGIONS: Unremarkable  OSSEOUS STRUCTURES: No acute fracture or destructive osseous lesion  Degenerative disease disease L4-5  IMPRESSION:     9 mm left interpolar angiomyolipoma (AML) which appears to correspond to previous ultrasound finding  Additional simple left renal cyst with cortical scarring left kidney  No solid renal masses  Mosaic attenuation more notable in the right lung suggesting small airway disease/air trapping  2 mm subpleural right lower lobe nodule  Based on current Fleischner Society 2017 Guidelines on incidental pulmonary nodule, no routine follow-up is needed if   the patient is considered low risk for lung cancer  If the patient is considered high risk for lung cancer, 12 month follow-up non-contrast chest CT is recommended         Workstation performed: [id number]     Signed by:   Shireen Gotti DO   1/10/18

## 2018-01-24 ENCOUNTER — ANESTHESIA EVENT (OUTPATIENT)
Dept: PERIOP | Facility: HOSPITAL | Age: 73
End: 2018-01-24
Payer: MEDICARE

## 2018-01-24 VITALS
HEART RATE: 72 BPM | BODY MASS INDEX: 31.21 KG/M2 | WEIGHT: 176.13 LBS | DIASTOLIC BLOOD PRESSURE: 80 MMHG | SYSTOLIC BLOOD PRESSURE: 130 MMHG | HEIGHT: 63 IN

## 2018-01-24 NOTE — ANESTHESIA PREPROCEDURE EVALUATION
Review of Systems/Medical History  Patient summary reviewed  Chart reviewed  No history of anesthetic complications     Cardiovascular  EKG reviewed, Exercise tolerance: poor, Exercise comment: OCHOA with 1 flight of stairs Hyperlipidemia, Hypertension , Dysrhythmias (took metoprolol this AM, last xeralto 1/22), atrial fibrillation,    Pulmonary  Negative pulmonary ROS Not a smoker , No recent URI ,        GI/Hepatic  Dysphagia,   GERD well controlled,        Prostatic disorder, benign prostatic hyperplasia       Endo/Other  History of thyroid disease , hypothyroidism,   Comment: Hx pituitary adenoma    GYN       Hematology  Negative hematology ROS      Musculoskeletal  Negative musculoskeletal ROS        Neurology    Headaches,   Comment: Pituitary tumor, encases carotid, too high risk for surgical intervention Psychology   Anxiety, Depression ,              Physical Exam    Airway    Mallampati score: II  TM Distance: >3 FB  Neck ROM: full     Dental   No notable dental hx     Cardiovascular  Rhythm: irregular, Rate: abnormal,     Pulmonary  Pulmonary exam normal     Other Findings        Anesthesia Plan  ASA Score- 3     Anesthesia Type- IV sedation with anesthesia with ASA Monitors  Additional Monitors:   Airway Plan:     Comment: I discussed the risks and benefits of IV sedation anesthesia including the possibility of the need to convert to general anesthesia and the potential risk of awareness  The patient was given the opportunity to ask questions, which were answered        Plan Factors-    Induction- intravenous  Postoperative Plan-     Informed Consent- Anesthetic plan and risks discussed with patient  Chris Hodge MD, have personally seen and examined the patient  I have reviewed the patient's history, medications, and recent vital signs and obtained informed consent, specifically offering to answer any of the patient and family questions about the plan for anesthesia

## 2018-01-25 ENCOUNTER — HOSPITAL ENCOUNTER (OUTPATIENT)
Facility: HOSPITAL | Age: 73
Setting detail: OUTPATIENT SURGERY
Discharge: HOME | End: 2018-01-25
Attending: INTERNAL MEDICINE | Admitting: INTERNAL MEDICINE
Payer: MEDICARE

## 2018-01-25 ENCOUNTER — ANESTHESIA (OUTPATIENT)
Dept: PERIOP | Facility: HOSPITAL | Age: 73
End: 2018-01-25
Payer: MEDICARE

## 2018-01-25 VITALS
BODY MASS INDEX: 27.48 KG/M2 | SYSTOLIC BLOOD PRESSURE: 169 MMHG | HEIGHT: 66 IN | WEIGHT: 171 LBS | HEART RATE: 128 BPM | OXYGEN SATURATION: 99 % | RESPIRATION RATE: 20 BRPM | DIASTOLIC BLOOD PRESSURE: 72 MMHG | TEMPERATURE: 97.6 F

## 2018-01-25 DIAGNOSIS — R13.14 DYSPHAGIA, PHARYNGOESOPHAGEAL PHASE: ICD-10-CM

## 2018-01-25 PROCEDURE — 88305 TISSUE EXAM BY PATHOLOGIST: CPT | Performed by: PATHOLOGY

## 2018-01-25 PROCEDURE — 43239 EGD BIOPSY SINGLE/MULTIPLE: CPT | Performed by: INTERNAL MEDICINE

## 2018-01-25 PROCEDURE — 43248 EGD GUIDE WIRE INSERTION: CPT | Performed by: INTERNAL MEDICINE

## 2018-01-25 PROCEDURE — 88305 TISSUE EXAM BY PATHOLOGIST: CPT | Performed by: INTERNAL MEDICINE

## 2018-01-25 RX ORDER — PROPOFOL 10 MG/ML
INJECTION, EMULSION INTRAVENOUS AS NEEDED
Status: DISCONTINUED | OUTPATIENT
Start: 2018-01-25 | End: 2018-01-25 | Stop reason: SURG

## 2018-01-25 RX ORDER — LIDOCAINE HYDROCHLORIDE 10 MG/ML
INJECTION, SOLUTION INFILTRATION; PERINEURAL AS NEEDED
Status: DISCONTINUED | OUTPATIENT
Start: 2018-01-25 | End: 2018-01-25 | Stop reason: SURG

## 2018-01-25 RX ORDER — SODIUM CHLORIDE, SODIUM LACTATE, POTASSIUM CHLORIDE, CALCIUM CHLORIDE 600; 310; 30; 20 MG/100ML; MG/100ML; MG/100ML; MG/100ML
125 INJECTION, SOLUTION INTRAVENOUS CONTINUOUS
Status: DISCONTINUED | OUTPATIENT
Start: 2018-01-25 | End: 2018-01-25 | Stop reason: HOSPADM

## 2018-01-25 RX ADMIN — SODIUM CHLORIDE, SODIUM LACTATE, POTASSIUM CHLORIDE, AND CALCIUM CHLORIDE: .6; .31; .03; .02 INJECTION, SOLUTION INTRAVENOUS at 08:47

## 2018-01-25 RX ADMIN — SODIUM CHLORIDE, SODIUM LACTATE, POTASSIUM CHLORIDE, AND CALCIUM CHLORIDE 125 ML/HR: .6; .31; .03; .02 INJECTION, SOLUTION INTRAVENOUS at 08:22

## 2018-01-25 RX ADMIN — PROPOFOL 20 MG: 10 INJECTION, EMULSION INTRAVENOUS at 09:02

## 2018-01-25 RX ADMIN — PROPOFOL 20 MG: 10 INJECTION, EMULSION INTRAVENOUS at 08:59

## 2018-01-25 RX ADMIN — LIDOCAINE HYDROCHLORIDE 50 MG: 10 INJECTION, SOLUTION INFILTRATION; PERINEURAL at 08:56

## 2018-01-25 RX ADMIN — PROPOFOL 100 MG: 10 INJECTION, EMULSION INTRAVENOUS at 08:56

## 2018-01-25 NOTE — ANESTHESIA POSTPROCEDURE EVALUATION
Post-Op Assessment Note      CV Status:  Stable    Mental Status:  Lethargic    Hydration Status:  Stable    PONV Controlled:  None    Airway Patency:  Patent    Post Op Vitals Reviewed: Yes          Staff: CRNA       Comments: vss          BP   145/78   Temp     Pulse 99   Resp 18   SpO2 99

## 2018-01-25 NOTE — OP NOTE
**** GI/ENDOSCOPY REPORT ****     PATIENT NAME: Dee Ruiz - VISIT ID:  Patient ID: [id number]   YOB: 1945     INTRODUCTION: Esophagogastroduodenoscopy - A 71 female patient presents   for an outpatient Esophagogastroduodenoscopy at [facility]  INDICATIONS: Dysphagia  CONSENT: The benefits, risks, and alternatives to the procedure were   discussed and informed consent was obtained from the patient  PREPARATION:  EKG, pulse, pulse oximetry and blood pressure were monitored   throughout the procedure  MEDICATIONS:asa 3     PROCEDURE:  The endoscope was passed without difficulty through the mouth   under direct visualization and advanced to the 3rd portion of the   duodenum  The scope was withdrawn and the mucosa was carefully examined  FINDINGS:   Esophagus: A mild stricture was found in the GE junction, 40   cm from the entry site  A biopsy was taken  Dilatation was performed,   using a Savary bougie with a guidewire  A 54 Fr dilator was passed  The Z   line was visualized at 40 cm from the entry site  Stomach: The antrum,   body of the stomach, cardia, fundus, incisura, and pylorus appeared to be   normal   Duodenum: The duodenal bulb, 2nd portion of the duodenum, and 3rd   portion of the duodenum appeared to be normal      COMPLICATIONS: There were no complications  IMPRESSIONS: Mild esophageal stricture present in the GE junction  Dilatation was performed  Z line visualized  Normal antrum, body of the   stomach, cardia, fundus, incisura, and pylorus  Normal duodenal bulb, 2nd   portion of the duodenum, and 3rd portion of the duodenum  RECOMMENDATIONS: Follow-up on the results of the biopsy specimens in 1   week  Continue current medications  ESTIMATED BLOOD LOSS: Insignificant  PATHOLOGY SPECIMENS: Biopsy taken  Associated finding: Stricture       PROCEDURE CODES: 42386 - EGD flexible; with biopsy 30778 - EGD flexible;   with guidewire insertion, dilation     ICD-9 Codes: 787 2 DYSPHAGIA 530 3 Stricture and stenosis of esophagus     ICD-10 Codes: R13 10 Dysphagia, unspecified K22 2 Esophageal obstruction     PERFORMED BY: Dr Marolyn Carrion, M D Elbert Dance  on 01/25/2018  Version 1, electronically signed by SIMON Schrader , D O  on   01/25/2018 at 09:06

## 2018-01-25 NOTE — DISCHARGE INSTRUCTIONS
Upper Endoscopy   WHAT YOU NEED TO KNOW:   An upper endoscopy is also called an upper gastrointestinal (GI) endoscopy, or an esophagogastroduodenoscopy (EGD)  You may feel bloated, gassy, or have some abdominal discomfort after your procedure  Your throat may be sore for 24 to 36 hours  You may burp or pass gas from air that is still inside your body  DISCHARGE INSTRUCTIONS:   Call 911 if:   · You have sudden chest pain or trouble breathing  Seek care immediately if:   · You feel dizzy or faint  · You have trouble swallowing  · You have severe throat pain  · Your bowel movements are very dark or black  · Your abdomen is hard and firm and you have severe pain  · You vomit blood  Contact your healthcare provider if:   · You feel full or bloated and cannot burp or pass gas  · You have not had a bowel movement for 3 days after your procedure  · You have neck pain  · You have a fever or chills  · You have nausea or are vomiting  · You have a rash or hives  · You have questions or concerns about your endoscopy  Relieve a sore throat:  Suck on throat lozenges or crushed ice  Gargle with a small amount of warm salt water  Mix 1 teaspoon of salt and 1 cup of warm water to make salt water  Relieve gas and discomfort from bloating:  Lie on your right side with a heating pad on your abdomen  Take short walks to help pass gas  Eat small meals until bloating is relieved  Rest after your procedure:  Do not drive or make important decisions until the day after your procedure  Return to your normal activity as directed  You can usually return to work the day after your procedure  Follow up with your healthcare provider as directed:  Write down your questions so you remember to ask them during your visits  © 2017 [employer]  Information is for End User's use only and may not be sold, redistributed or otherwise used for commercial purposes   All illustrations and images included in [employer] are the copyrighted property of A D A M [employer] Inc  or Evelio Jaimes  The above information is an  only  It is not intended as medical advice for individual conditions or treatments  Talk to your doctor, nurse or pharmacist before following any medical regimen to see if it is safe and effective for you

## 2018-02-01 ENCOUNTER — TELEPHONE (OUTPATIENT)
Dept: GASTROENTEROLOGY | Facility: CLINIC | Age: 73
End: 2018-02-01

## 2018-02-01 DIAGNOSIS — R51.9 NONINTRACTABLE HEADACHE, UNSPECIFIED CHRONICITY PATTERN, UNSPECIFIED HEADACHE TYPE: Primary | ICD-10-CM

## 2018-02-01 RX ORDER — BUTALBITAL/ACETAMINOPHEN 50MG-325MG
1 TABLET ORAL 2 TIMES DAILY PRN
Qty: 60 EACH | Refills: 1 | Status: SHIPPED | OUTPATIENT
Start: 2018-02-01 | End: 2018-02-01 | Stop reason: CLARIF

## 2018-02-01 RX ORDER — BUTALBITAL, ASPIRIN AND CAFFEINE 50; 325; 40 MG/1; MG/1; MG/1
1 TABLET ORAL 2 TIMES DAILY PRN
Qty: 60 TABLET | Refills: 1 | Status: SHIPPED | OUTPATIENT
Start: 2018-02-01 | End: 2018-03-03

## 2018-02-02 ENCOUNTER — TELEPHONE (OUTPATIENT)
Dept: NEUROLOGY | Facility: CLINIC | Age: 73
End: 2018-02-02

## 2018-02-02 DIAGNOSIS — R51.9 SEVERE FRONTAL HEADACHES: Primary | ICD-10-CM

## 2018-02-02 NOTE — TELEPHONE ENCOUNTER
Called patient to confirm, the medication which was discontinued is not on formulary and hence discontinued alternate medication was prescribed

## 2018-02-05 RX ORDER — BUTALBITAL, ACETAMINOPHEN AND CAFFEINE 50; 325; 40 MG/1; MG/1; MG/1
1 TABLET ORAL EVERY 4 HOURS PRN
Qty: 30 TABLET | Refills: 0 | Status: SHIPPED | OUTPATIENT
Start: 2018-02-05 | End: 2018-04-24 | Stop reason: SDUPTHER

## 2018-02-13 ENCOUNTER — OFFICE VISIT (OUTPATIENT)
Dept: CARDIOLOGY CLINIC | Facility: CLINIC | Age: 73
End: 2018-02-13
Payer: MEDICARE

## 2018-02-13 VITALS
OXYGEN SATURATION: 97 % | DIASTOLIC BLOOD PRESSURE: 64 MMHG | WEIGHT: 169.6 LBS | HEART RATE: 69 BPM | BODY MASS INDEX: 27.26 KG/M2 | SYSTOLIC BLOOD PRESSURE: 92 MMHG | HEIGHT: 66 IN

## 2018-02-13 DIAGNOSIS — I48.20 CHRONIC A-FIB (HCC): Primary | ICD-10-CM

## 2018-02-13 DIAGNOSIS — I10 HYPERTENSION, ESSENTIAL: ICD-10-CM

## 2018-02-13 DIAGNOSIS — Z79.01 CHRONIC ANTICOAGULATION: ICD-10-CM

## 2018-02-13 PROCEDURE — 99214 OFFICE O/P EST MOD 30 MIN: CPT | Performed by: INTERNAL MEDICINE

## 2018-02-13 NOTE — PROGRESS NOTES
Cardiology Follow Up    Rosalba Hammans  1945  [id number]  [address] [facility] [address] [address]    1  Chronic a-fib ([facility] )     2  Hypertension, essential     3  Chronic anticoagulation       Chief Complaint   Patient presents with    Follow-up       Interval History:  Patient presents for follow-up visit  Patient denies any history of chest pain  Patient does have some shortness of breath with exertion which is stable  No history of orthopnea PND  No history of presyncope syncope  She states that she has been compliant with all her present medications  She denies any history of bleeding issues  Patient is on anticoagulation for atrial fibrillation  Patient recently had endoscopy and was found to have atrial fibrillation with rapid ventricular response  Patient Active Problem List   Diagnosis    Chronic a-fib ([facility] )    Hypertension, essential    Chronic anticoagulation     Past Medical History:   Diagnosis Date    Anxiety     Atrial fibrillation ([facility] )     Depression     Disease of thyroid gland     GERD (gastroesophageal reflux disease)     Hyperlipidemia     Hypertension     Migraine     Pituitary tumor      Social History     Social History    Marital status:      Spouse name: N/A    Number of children: N/A    Years of education: N/A     Occupational History    Not on file  Social History Main Topics    Smoking status: Never Smoker    Smokeless tobacco: Never Used    Alcohol use Yes      Comment: social    Drug use: No    Sexual activity: Not on file     Other Topics Concern    Not on file     Social History Narrative    No narrative on file      No family history on file    Past Surgical History:   Procedure Laterality Date    APPENDECTOMY      CATARACT EXTRACTION      CORONARY ANGIOPLASTY WITH STENT PLACEMENT      DILATION AND CURETTAGE OF UTERUS  ESOPHAGOGASTRODUODENOSCOPY      FIXATION KYPHOPLASTY      HYSTERECTOMY      AL ESOPHAGOGASTRODUODENOSCOPY TRANSORAL DIAGNOSTIC N/A 1/25/2018    Procedure: ESOPHAGOGASTRODUODENOSCOPY (EGD); Surgeon: Chino Hdz MD;  Location: [facility];   Service: Gastroenterology    TONSILLECTOMY         Current Outpatient Prescriptions:     AMLODIPINE BESYLATE-VALSARTAN PO, Take 5 mg by mouth daily, Disp: , Rfl:     butalbital-acetaminophen-caffeine (FIORICET,ESGIC) -40 mg per tablet, Take 1 tablet by mouth every 4 (four) hours as needed for headaches, Disp: 30 tablet, Rfl: 0    butalbital-aspirin-caffeine (FIORINAL) -40 MG per tablet, Take 1 tablet by mouth 2 (two) times a day as needed for headaches for up to 30 days, Disp: 60 tablet, Rfl: 1    cabergoline (DOSTINEX) 0 5 MG tablet, Take 0 25 mg by mouth 2 (two) times a week, Disp: , Rfl:     Cholecalciferol (VITAMIN D) 2000 units tablet, Take 2,000 Units by mouth daily, Disp: , Rfl:     levothyroxine 25 mcg tablet, Take 25 mcg by mouth daily, Disp: , Rfl:     lisinopril (ZESTRIL) 5 mg tablet, Take 5 mg by mouth daily, Disp: , Rfl:     LORazepam (ATIVAN) 1 mg tablet, Take 0 5 mg by mouth every 8 (eight) hours as needed for anxiety, Disp: , Rfl:     METOPROLOL TARTRATE PO, Take 50 mg by mouth 2 (two) times a day, Disp: , Rfl:     midodrine (PROAMATINE) 2 5 mg tablet, Take 2 5 mg by mouth 2 (two) times a day, Disp: , Rfl:     omeprazole (PriLOSEC) 20 mg delayed release capsule, Take 20 mg by mouth daily, Disp: , Rfl:     rivaroxaban (XARELTO) 20 mg tablet, Take 20 mg by mouth daily, Disp: , Rfl:     simvastatin (ZOCOR) 20 mg tablet, Take 20 mg by mouth daily at bedtime, Disp: , Rfl:   Allergies   Allergen Reactions    Cephalosporins     Keflex [Cephalexin]        Labs:  Admission on 01/25/2018, Discharged on 01/25/2018   Component Date Value    Case Report 01/25/2018                      Value:Surgical Pathology Report Case: [id number]                                   Authorizing Provider:  Franky Middleton MD      Collected:           01/25/2018 0900              Ordering Location:     [address] [place] [address] Received:            01/25/2018 1016                                     Operating Room                                                               Pathologist:           Julia Sunshine MD                                                          Specimen:    Esophagogastric junction, GEJ cold bx                                                      Final Diagnosis 01/25/2018                      Value: This result contains rich text formatting which cannot be displayed here   Note 01/25/2018                      Value: This result contains rich text formatting which cannot be displayed here   Additional Information 01/25/2018                      Value: This result contains rich text formatting which cannot be displayed here  [employer] Description 01/25/2018                      Value: This result contains rich text formatting which cannot be displayed here   Clinical Information 01/25/2018                      Value:R/o dysplasia   Appointment on 01/10/2018   Component Date Value    Growth Hormone 01/10/2018 2 4     IGF Binding Protein 3 01/10/2018 2328      Imaging: No results found      Review of Systems:  Review of Systems   REVIEW OF SYSTEMS:  Constitutional:  Denies fever or chills   Eyes:  Denies change in visual acuity   HENT:  Denies nasal congestion or sore throat   Respiratory:  Denies cough or shortness of breath   Cardiovascular:  Denies chest pain or edema   GI:  Denies abdominal pain, nausea, vomiting, bloody stools or diarrhea   :  Denies dysuria, frequency, difficulty in micturition and nocturia  Musculoskeletal:  Denies back pain or joint pain   Neurologic:  Denies headache, focal weakness or sensory changes   Endocrine:  Denies polyuria or polydipsia   Lymphatic:  Denies swollen glands Psychiatric:  Denies depression or anxiety   BP 92/64   Pulse 69   Ht 5' 6" (1 676 m)   Wt 76 9 kg (169 lb 9 6 oz)   SpO2 97%   BMI 27 37 kg/m²     Physical Exam:  Physical Exam   PHYSICAL EXAM:  General:  Patient is not in acute distress   Head: Normocephalic, Atraumatic  HEENT:  Both pupils normal-size atraumatic, normocephalic, nonicteric  Neck:  JVP not raised  Trachea central  No carotid bruit  Respiratory:  normal breath sounds no crackles  no rhonchi  Cardiovascular:  Irregularly irregular  GI:  Abdomen soft nontender  No organomegaly  Lymphatic:  No cervical or inguinal lymphadenopathy  Neurologic:  Patient is awake alert, oriented   Grossly nonfocal    Discussion/Summary:  Patient with multiple medical problems who seems to be doing reasonably well from cardiac standpoint  Previous studies reviewed with patient  Medications reviewed and possible side effects discussed  concepts of cardiovascular disease , signs and symptoms of heart disease  Dietary and risk factor modification reinforced  All questions answered  Safety measures reviewed  Patient advised to report any problems prompting medical attention  Patient understands the risks and benefits of anticoagulation therapy to prevent thromboembolic risk from atrial fibrillation  Patient will be scheduled for a Holter monitor to assess heart rate response and see if there is any need for optimization of rate control measures for atrial fibrillation  Importance of salt restriction reinforced  All her medications were reviewed  Patient had a few questions which were answered  Symptoms to watch out from cardiac standpoint discussed at length with patient  Follow-up with primary care physician as well as Neurology  Time 25 minutes

## 2018-02-19 ENCOUNTER — TELEPHONE (OUTPATIENT)
Dept: NEUROLOGY | Facility: CLINIC | Age: 73
End: 2018-02-19

## 2018-02-19 NOTE — TELEPHONE ENCOUNTER
Pt called back stating rx for fioricet was only for a qty of 30 and her usual rx is for # 60 per month  If agreeable to this qty please enter rx for the other 30 and we will call in

## 2018-02-19 NOTE — TELEPHONE ENCOUNTER
Claudio Grace, pharmacist from [facility] called states that pt brought in a Fiorinal script that appeared to be photo copied and your name and LANEY# was crossed off  I see that Fiorinal was ordered on 2/1/18 and Fioricet was ordered on 2/5/18    Claudio Grace is confirming if you're ordering both or not?      [phone]

## 2018-02-27 ENCOUNTER — HOSPITAL ENCOUNTER (OUTPATIENT)
Dept: NON INVASIVE DIAGNOSTICS | Facility: CLINIC | Age: 73
Discharge: HOME | End: 2018-02-27
Payer: MEDICARE

## 2018-02-27 DIAGNOSIS — I48.20 CHRONIC A-FIB (HCC): ICD-10-CM

## 2018-02-27 PROCEDURE — 93226 XTRNL ECG REC<48 HR SCAN A/R: CPT

## 2018-02-27 PROCEDURE — 93225 XTRNL ECG REC<48 HRS REC: CPT

## 2018-02-28 DIAGNOSIS — I48.91 ATRIAL FIBRILLATION, UNSPECIFIED TYPE (HCC): Primary | ICD-10-CM

## 2018-02-28 DIAGNOSIS — F41.9 ANXIETY: Primary | ICD-10-CM

## 2018-02-28 RX ORDER — RIVAROXABAN 20 MG/1
TABLET, FILM COATED ORAL
Qty: 30 TABLET | Refills: 5 | Status: SHIPPED | OUTPATIENT
Start: 2018-02-28 | End: 2018-08-22 | Stop reason: SDUPTHER

## 2018-02-28 RX ORDER — LORAZEPAM 1 MG/1
TABLET ORAL
Qty: 60 TABLET | Refills: 0 | Status: SHIPPED | OUTPATIENT
Start: 2018-02-28 | End: 2018-04-16 | Stop reason: SDUPTHER

## 2018-03-04 PROCEDURE — 93227 XTRNL ECG REC<48 HR R&I: CPT | Performed by: INTERNAL MEDICINE

## 2018-03-05 ENCOUNTER — TELEPHONE (OUTPATIENT)
Dept: CARDIOLOGY CLINIC | Facility: CLINIC | Age: 73
End: 2018-03-05

## 2018-03-09 DIAGNOSIS — E03.9 HYPOTHYROIDISM, UNSPECIFIED TYPE: Primary | ICD-10-CM

## 2018-03-09 RX ORDER — LEVOTHYROXINE SODIUM 0.03 MG/1
TABLET ORAL
Qty: 90 TABLET | Refills: 0 | Status: SHIPPED | OUTPATIENT
Start: 2018-03-09 | End: 2018-04-24 | Stop reason: SDUPTHER

## 2018-03-19 NOTE — TELEPHONE ENCOUNTER
Called patient, she has an appointment in 2 weeks, informed her to call the pharmacy for the other 30 tablets if needed

## 2018-03-19 NOTE — TELEPHONE ENCOUNTER
Requested Prescriptions   Pending Prescriptions Disp Refills     hydrochlorothiazide (HYDRODIURIL) 25 MG tablet [Pharmacy Med Name: HYDROCHLOROTHIAZIDE 25 MG TAB] 90 tablet 1     Sig: TAKE 1 TABLET BY MOUTH EVERY MORNING    Diuretics (Including Combos) Protocol Failed    11/4/2017 12:57 AM       Failed - Normal serum creatinine on file in past 12 months    Recent Labs   Lab Test  04/05/17   1000   CR  1.27*             Passed - Blood pressure under 140/90    BP Readings from Last 3 Encounters:   04/05/17 136/70   02/15/17 120/62   01/18/17 130/58                Passed - Recent or future visit with authorizing provider's specialty    Patient had office visit in the last year or has a visit in the next 30 days with authorizing provider.  See chart review.              Passed - Patient is age 18 or older       Passed - Normal serum potassium on file in past 12 months    Recent Labs   Lab Test  04/05/17   1000   POTASSIUM  4.6                   Passed - Normal serum sodium on file in past 12 months    Recent Labs   Lab Test  04/05/17   1000   NA  138              Routing refill request to provider for review/approval because:  Labs out of range  Vijaya Mejia RN [facility] Triage.           Spoke w/Mariusz @[facility], the fioricet rx was for 30#, It was filled on 2/6, he was able to write another script for another 30# per your order

## 2018-03-23 DIAGNOSIS — E78.5 HYPERLIPIDEMIA, UNSPECIFIED HYPERLIPIDEMIA TYPE: Primary | ICD-10-CM

## 2018-03-23 RX ORDER — SIMVASTATIN 20 MG
TABLET ORAL
Qty: 90 TABLET | Refills: 3 | Status: SHIPPED | OUTPATIENT
Start: 2018-03-23 | End: 2019-03-15 | Stop reason: SDUPTHER

## 2018-04-05 DIAGNOSIS — I10 ESSENTIAL HYPERTENSION: Primary | ICD-10-CM

## 2018-04-05 RX ORDER — AMLODIPINE BESYLATE 5 MG/1
TABLET ORAL
Qty: 90 TABLET | Refills: 3 | Status: SHIPPED | OUTPATIENT
Start: 2018-04-05 | End: 2019-03-23 | Stop reason: SDUPTHER

## 2018-04-09 DIAGNOSIS — I95.1 ORTHOSTATIC HYPOTENSION: Primary | ICD-10-CM

## 2018-04-09 RX ORDER — MIDODRINE HYDROCHLORIDE 2.5 MG/1
TABLET ORAL
Qty: 60 TABLET | Refills: 3 | Status: SHIPPED | OUTPATIENT
Start: 2018-04-09 | End: 2018-07-31 | Stop reason: SDUPTHER

## 2018-04-16 DIAGNOSIS — F41.9 ANXIETY: ICD-10-CM

## 2018-04-17 ENCOUNTER — TRANSCRIBE ORDERS (OUTPATIENT)
Dept: ADMINISTRATIVE | Facility: HOSPITAL | Age: 73
End: 2018-04-17

## 2018-04-17 DIAGNOSIS — I51.9 MYXEDEMA HEART DISEASE: ICD-10-CM

## 2018-04-17 DIAGNOSIS — D44.4 NEOPLASM OF UNCERTAIN BEHAVIOR OF PITUITARY GLAND AND CRANIOPHARYNGEAL DUCT (HCC): Primary | ICD-10-CM

## 2018-04-17 DIAGNOSIS — E03.9 MYXEDEMA HEART DISEASE: ICD-10-CM

## 2018-04-17 DIAGNOSIS — D44.3 NEOPLASM OF UNCERTAIN BEHAVIOR OF PITUITARY GLAND AND CRANIOPHARYNGEAL DUCT (HCC): Primary | ICD-10-CM

## 2018-04-17 RX ORDER — LORAZEPAM 1 MG/1
TABLET ORAL
Qty: 60 TABLET | Refills: 0 | Status: SHIPPED | OUTPATIENT
Start: 2018-04-17 | End: 2018-05-21 | Stop reason: SDUPTHER

## 2018-04-18 ENCOUNTER — APPOINTMENT (OUTPATIENT)
Dept: LAB | Facility: HOSPITAL | Age: 73
End: 2018-04-18
Attending: INTERNAL MEDICINE
Payer: MEDICARE

## 2018-04-18 DIAGNOSIS — I48.91 ATRIAL FIBRILLATION (HCC): ICD-10-CM

## 2018-04-18 DIAGNOSIS — E03.9 MYXEDEMA HEART DISEASE: ICD-10-CM

## 2018-04-18 DIAGNOSIS — I10 ESSENTIAL (PRIMARY) HYPERTENSION: ICD-10-CM

## 2018-04-18 DIAGNOSIS — R73.01 IMPAIRED FASTING GLUCOSE: ICD-10-CM

## 2018-04-18 DIAGNOSIS — D44.3 NEOPLASM OF UNCERTAIN BEHAVIOR OF PITUITARY GLAND AND CRANIOPHARYNGEAL DUCT (HCC): ICD-10-CM

## 2018-04-18 DIAGNOSIS — D44.4 NEOPLASM OF UNCERTAIN BEHAVIOR OF PITUITARY GLAND AND CRANIOPHARYNGEAL DUCT (HCC): ICD-10-CM

## 2018-04-18 DIAGNOSIS — E03.9 HYPOTHYROIDISM: ICD-10-CM

## 2018-04-18 DIAGNOSIS — E78.00 PURE HYPERCHOLESTEROLEMIA: ICD-10-CM

## 2018-04-18 DIAGNOSIS — I51.9 MYXEDEMA HEART DISEASE: ICD-10-CM

## 2018-04-18 LAB
ALBUMIN SERPL BCP-MCNC: 3.5 G/DL (ref 3.5–5)
ALP SERPL-CCNC: 133 U/L (ref 46–116)
ALT SERPL W P-5'-P-CCNC: 13 U/L (ref 12–78)
ANION GAP SERPL CALCULATED.3IONS-SCNC: 10 MMOL/L (ref 4–13)
AST SERPL W P-5'-P-CCNC: 13 U/L (ref 5–45)
BILIRUB SERPL-MCNC: 0.6 MG/DL (ref 0.2–1)
BUN SERPL-MCNC: 14 MG/DL (ref 5–25)
CALCIUM SERPL-MCNC: 9.2 MG/DL (ref 8.3–10.1)
CHLORIDE SERPL-SCNC: 104 MMOL/L (ref 100–108)
CHOLEST SERPL-MCNC: 192 MG/DL (ref 50–200)
CK SERPL-CCNC: 24 U/L (ref 26–192)
CO2 SERPL-SCNC: 27 MMOL/L (ref 21–32)
CORTIS AM PEAK SERPL-MCNC: 23.5 UG/DL (ref 4.2–22.4)
CREAT SERPL-MCNC: 0.89 MG/DL (ref 0.6–1.3)
GFR SERPL CREATININE-BSD FRML MDRD: 65 ML/MIN/1.73SQ M
GLUCOSE P FAST SERPL-MCNC: 109 MG/DL (ref 65–99)
HDLC SERPL-MCNC: 89 MG/DL (ref 40–60)
LDLC SERPL CALC-MCNC: 87 MG/DL (ref 0–100)
POTASSIUM SERPL-SCNC: 3.6 MMOL/L (ref 3.5–5.3)
PROLACTIN SERPL-MCNC: 16.6 NG/ML
PROT SERPL-MCNC: 7.3 G/DL (ref 6.4–8.2)
SODIUM SERPL-SCNC: 141 MMOL/L (ref 136–145)
T4 FREE SERPL-MCNC: 1.17 NG/DL (ref 0.76–1.46)
TRIGL SERPL-MCNC: 78 MG/DL
TSH SERPL DL<=0.05 MIU/L-ACNC: 1.99 UIU/ML (ref 0.36–3.74)

## 2018-04-18 PROCEDURE — 82533 TOTAL CORTISOL: CPT

## 2018-04-18 PROCEDURE — 80061 LIPID PANEL: CPT

## 2018-04-18 PROCEDURE — 84146 ASSAY OF PROLACTIN: CPT

## 2018-04-18 PROCEDURE — 84443 ASSAY THYROID STIM HORMONE: CPT

## 2018-04-18 PROCEDURE — 80053 COMPREHEN METABOLIC PANEL: CPT

## 2018-04-18 PROCEDURE — 82024 ASSAY OF ACTH: CPT

## 2018-04-18 PROCEDURE — 84439 ASSAY OF FREE THYROXINE: CPT

## 2018-04-18 PROCEDURE — 82550 ASSAY OF CK (CPK): CPT

## 2018-04-18 PROCEDURE — 36415 COLL VENOUS BLD VENIPUNCTURE: CPT

## 2018-04-18 PROCEDURE — 83036 HEMOGLOBIN GLYCOSYLATED A1C: CPT

## 2018-04-19 LAB
EST. AVERAGE GLUCOSE BLD GHB EST-MCNC: 103 MG/DL
HBA1C MFR BLD: 5.2 % (ref 4.2–6.3)

## 2018-04-20 DIAGNOSIS — E78.00 PURE HYPERCHOLESTEROLEMIA: ICD-10-CM

## 2018-04-20 DIAGNOSIS — I48.91 ATRIAL FIBRILLATION (HCC): ICD-10-CM

## 2018-04-20 DIAGNOSIS — E03.9 HYPOTHYROIDISM: ICD-10-CM

## 2018-04-20 DIAGNOSIS — R73.01 IMPAIRED FASTING GLUCOSE: ICD-10-CM

## 2018-04-20 DIAGNOSIS — I10 ESSENTIAL (PRIMARY) HYPERTENSION: ICD-10-CM

## 2018-04-20 LAB — ACTH PLAS-MCNC: 32.4 PG/ML (ref 7.2–63.3)

## 2018-04-24 ENCOUNTER — OFFICE VISIT (OUTPATIENT)
Dept: INTERNAL MEDICINE CLINIC | Facility: CLINIC | Age: 73
End: 2018-04-24
Payer: MEDICARE

## 2018-04-24 VITALS
WEIGHT: 168.8 LBS | BODY MASS INDEX: 27.13 KG/M2 | SYSTOLIC BLOOD PRESSURE: 130 MMHG | DIASTOLIC BLOOD PRESSURE: 86 MMHG | HEART RATE: 73 BPM | OXYGEN SATURATION: 99 % | HEIGHT: 66 IN

## 2018-04-24 DIAGNOSIS — E78.00 HYPERCHOLESTEROLEMIA: ICD-10-CM

## 2018-04-24 DIAGNOSIS — R73.01 ABNORMAL FASTING GLUCOSE: Primary | ICD-10-CM

## 2018-04-24 DIAGNOSIS — Z12.39 SCREENING FOR BREAST CANCER: ICD-10-CM

## 2018-04-24 DIAGNOSIS — I10 BENIGN ESSENTIAL HYPERTENSION: ICD-10-CM

## 2018-04-24 DIAGNOSIS — M81.0 AGE-RELATED OSTEOPOROSIS WITHOUT CURRENT PATHOLOGICAL FRACTURE: ICD-10-CM

## 2018-04-24 DIAGNOSIS — R51.9 SEVERE FRONTAL HEADACHES: ICD-10-CM

## 2018-04-24 DIAGNOSIS — I48.0 PAROXYSMAL ATRIAL FIBRILLATION (HCC): ICD-10-CM

## 2018-04-24 DIAGNOSIS — E03.9 ACQUIRED HYPOTHYROIDISM: ICD-10-CM

## 2018-04-24 DIAGNOSIS — E22.1 HYPERPROLACTINEMIA (HCC): ICD-10-CM

## 2018-04-24 DIAGNOSIS — E55.9 VITAMIN D DEFICIENCY: ICD-10-CM

## 2018-04-24 DIAGNOSIS — R55 VASOVAGAL SYNCOPE: ICD-10-CM

## 2018-04-24 DIAGNOSIS — Z12.11 SCREEN FOR COLON CANCER: ICD-10-CM

## 2018-04-24 PROBLEM — I48.91 ATRIAL FIBRILLATION (HCC): Status: ACTIVE | Noted: 2018-02-13

## 2018-04-24 PROBLEM — Z00.00 WELLNESS EXAMINATION: Status: ACTIVE | Noted: 2018-04-24

## 2018-04-24 PROCEDURE — 99215 OFFICE O/P EST HI 40 MIN: CPT | Performed by: INTERNAL MEDICINE

## 2018-04-24 RX ORDER — PANTOPRAZOLE SODIUM 40 MG/1
TABLET, DELAYED RELEASE ORAL
COMMUNITY
Start: 2018-03-07 | End: 2018-11-28 | Stop reason: SDUPTHER

## 2018-04-24 RX ORDER — LEVOTHYROXINE SODIUM 0.03 MG/1
1 TABLET ORAL DAILY
COMMUNITY
Start: 2015-12-17 | End: 2018-12-06 | Stop reason: SDUPTHER

## 2018-04-24 RX ORDER — MULTIVIT-MIN/IRON/FOLIC ACID/K 18-600-40
1 CAPSULE ORAL DAILY
COMMUNITY
Start: 2016-08-09

## 2018-04-24 RX ORDER — BUTALBITAL, ACETAMINOPHEN AND CAFFEINE 50; 325; 40 MG/1; MG/1; MG/1
1 TABLET ORAL EVERY 4 HOURS PRN
Qty: 30 TABLET | Refills: 0 | Status: SHIPPED | OUTPATIENT
Start: 2018-04-24 | End: 2018-05-01 | Stop reason: SDUPTHER

## 2018-04-24 NOTE — PROGRESS NOTES
Assessment/Plan:    Vasovagal syncope  Patient had holter monitor in February which showed pauses and sam cardia  Electrolytes wnl  Will order event monitor    Hypercholesterolemia  ldl at goal ck and [facility] wnl    Abnormal fasting glucose  No overt dm  Follow fbs a1c and microalbumen ratio    Hyperprolactinemia (HCC)  Prolactin wnl  Continue with current meds     Hypothyroidism  Chemically and clinically euthyroid  Follow tsh     Atrial fibrillation (HCC)  Rate controlled and anticoagulated follow with Dr Gillis Oppenheim  Wellness examination  Wellness exam completed and form reviewed  Diagnoses and all orders for this visit:    Abnormal fasting glucose    Hyperprolactinemia (HCC)    Acquired hypothyroidism    Paroxysmal atrial fibrillation (HCC)    Benign essential hypertension    Age-related osteoporosis without current pathological fracture    Hypercholesterolemia    Other orders  -     pantoprazole (PROTONIX) 40 mg tablet;   -     Cholecalciferol (VITAMIN D) 2000 units CAPS; Take 1 tablet by mouth daily  -     levothyroxine 25 mcg tablet; Take 1 tablet by mouth daily          Subjective:      Patient ID: Gm Monroe is a 71 y o  female  Patient presents in follow up for her medical problems and to review recent lab work  She had an episode of syncope where she was out for 5 -10 min but couldn't remember anything for 1/2 hr  She had no bowel or bladder function, chest pain  Sob or palpitiations  The following portions of the patient's history were reviewed and updated as appropriate: allergies, current medications, past family history, past medical history, past social history, past surgical history and problem list     Review of Systems   Constitutional: Negative for activity change, appetite change, chills, diaphoresis, fatigue, fever and unexpected weight change     HENT: Negative for congestion, dental problem, drooling, ear discharge, ear pain, facial swelling, hearing loss, mouth sores, nosebleeds, postnasal drip, rhinorrhea, sinus pain, sinus pressure, sneezing, sore throat, tinnitus, trouble swallowing and voice change  Eyes: Negative for photophobia, pain, discharge, redness, itching and visual disturbance  Respiratory: Negative for apnea, cough, choking, chest tightness, shortness of breath, wheezing and stridor  Cardiovascular: Negative for chest pain, palpitations and leg swelling  Gastrointestinal: Negative for abdominal distention, abdominal pain, anal bleeding, blood in stool, constipation, diarrhea, nausea, rectal pain and vomiting  Endocrine: Negative for cold intolerance, heat intolerance, polydipsia, polyphagia and polyuria  Genitourinary: Negative for decreased urine volume, difficulty urinating, dysuria, enuresis, flank pain, frequency, genital sores, hematuria and urgency  Musculoskeletal: Negative for arthralgias, back pain, gait problem, joint swelling, myalgias, neck pain and neck stiffness  Skin: Negative for color change, pallor, rash and wound  Allergic/Immunologic: Negative for environmental allergies, food allergies and immunocompromised state  Neurological: Negative for dizziness, tremors, seizures, syncope, facial asymmetry, speech difficulty, weakness, light-headedness, numbness and headaches  Hematological: Negative for adenopathy  Does not bruise/bleed easily  Psychiatric/Behavioral: Negative for agitation, self-injury, sleep disturbance and suicidal ideas  The patient is not nervous/anxious  Objective:      /100 (BP Location: Left arm, Patient Position: Sitting)   Pulse 73   Ht 5' 6" (1 676 m)   Wt 76 6 kg (168 lb 12 8 oz)   SpO2 99%   BMI 27 25 kg/m²          Physical Exam   Constitutional: She is oriented to person, place, and time  She appears well-developed and well-nourished  No distress  HENT:   Head: Normocephalic and atraumatic     Right Ear: External ear normal    Left Ear: External ear normal    Mouth/Throat: Oropharynx is clear and moist    Eyes: Conjunctivae and EOM are normal  Pupils are equal, round, and reactive to light  No scleral icterus  Neck: Normal range of motion  Neck supple  No JVD present  No tracheal deviation present  No thyromegaly present  Cardiovascular: Normal rate, regular rhythm and intact distal pulses  Exam reveals no gallop and no friction rub  No murmur heard  Pulmonary/Chest: Effort normal and breath sounds normal  No respiratory distress  She has no wheezes  She has no rales  She exhibits no tenderness  Abdominal: Soft  Bowel sounds are normal  She exhibits no distension and no mass  There is no tenderness  There is no rebound and no guarding  Musculoskeletal: Normal range of motion  She exhibits no edema, tenderness or deformity  Lymphadenopathy:     She has no cervical adenopathy  Neurological: She is alert and oriented to person, place, and time  She has normal reflexes  No cranial nerve deficit  Coordination normal    Skin: Skin is warm and dry  No rash noted  She is not diaphoretic  No erythema  No pallor  Psychiatric: She has a normal mood and affect   Her behavior is normal  Judgment and thought content normal

## 2018-04-24 NOTE — PROGRESS NOTES
HPI:  Heydi Aceves is a 71 y o  female here for her Initial Wellness Visit  Patient Active Problem List   Diagnosis    Atrial fibrillation (HCC)    Benign essential hypertension    Chronic anticoagulation    Abnormal brain MRI    Abnormal fasting glucose    Anxiety    Blepharoptosis    Depression    Elevated prolactin level (HCC)    Esophageal ring, acquired    Fibrocystic breast disease    Gastroesophageal reflux disease with esophagitis    Headache    Hypercholesterolemia    Hyperprolactinemia ([address]    Hypothyroidism    Obesity    Osteoporosis    Pituitary adenoma ([address]    Prolactinoma ([address]    Third nerve palsy of left eye    Vitamin D deficiency    Vasovagal syncope    Wellness examination    Screen for colon cancer    Screening for breast cancer     Past Medical History:   Diagnosis Date    Anemia     last assessed: 12/14/2015    Ankle fracture     Anxiety     last assessed: 11/14/2017    Atrial fibrillation ([address] )     last assessed: 1/18/2015    Brain mass     Cataract     Depression     Disease of thyroid gland     Esophageal perforation     Fracture, shoulder     GERD (gastroesophageal reflux disease)     Hyperlipidemia     Hypertension     Hypothyroidism     last assessed: 11/20/2017    Left heart failure ([address] )     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[address] )     last assessed: 7/23/2015    Migraine     Pituitary adenoma ([address] )     last assessed: 11/20/2017    Pituitary tumor     Renal failure     Wrist fracture      Past Surgical History:   Procedure Laterality Date    APPENDECTOMY      CATARACT EXTRACTION      CORONARY ANGIOPLASTY WITH STENT PLACEMENT  01/2016    DILATION AND CURETTAGE OF UTERUS      ESOPHAGOGASTRODUODENOSCOPY  08/29/2012    diagnostic    FIXATION KYPHOPLASTY      HYSTERECTOMY      AR ESOPHAGOGASTRODUODENOSCOPY TRANSORAL DIAGNOSTIC N/A 1/25/2018    Procedure: ESOPHAGOGASTRODUODENOSCOPY (EGD);   Surgeon: Harmeet Mayo MD;  Location: [facility]; Service: Gastroenterology    TONSILLECTOMY      TOTAL ABDOMINAL HYSTERECTOMY W/ BILATERAL SALPINGOOPHORECTOMY      VERTEBRAL AUGMENTATION      percutaneous vertebral augmentation kyphoplasty     Family History   Problem Relation Age of Onset    Osteoporosis Mother     Other Mother      sepsis    Other Father      cerebellar hemorrhage; hemorrhage in brain    Hypertension Father     Thyroid disease Sister      thyroid disorder    Colon cancer Maternal Grandmother     Heart attack Maternal Grandfather      acute myocardial infarction    Stomach cancer Paternal Grandmother     Other Paternal Grandfather      urinary retention    Stroke Paternal Uncle      syndrome     History   Smoking Status    Never Smoker   Smokeless Tobacco    Never Used     History   Alcohol Use    Yes     Comment: social; drinks wine      History   Drug Use No     /86   Pulse 73   Ht 5' 6" (1 676 m)   Wt 76 6 kg (168 lb 12 8 oz)   SpO2 99%   BMI 27 25 kg/m²       Current Outpatient Prescriptions   Medication Sig Dispense Refill    amLODIPine (NORVASC) 5 mg tablet TAKE 1 TABLET BY MOUTH DAILY IN THE MORNING 90 tablet 3    butalbital-acetaminophen-caffeine (FIORICET,ESGIC) -40 mg per tablet Take 1 tablet by mouth every 4 (four) hours as needed for headaches 30 tablet 0    cabergoline (DOSTINEX) 0 5 MG tablet Take 0 25 mg by mouth 2 (two) times a week      Cholecalciferol (VITAMIN D) 2000 units CAPS Take 1 tablet by mouth daily      levothyroxine 25 mcg tablet Take 1 tablet by mouth daily      lisinopril (ZESTRIL) 5 mg tablet Take 5 mg by mouth daily      LORazepam (ATIVAN) 1 mg tablet TAKE 1 TABLET BY MOUTH TWICE A DAY 60 tablet 0    METOPROLOL TARTRATE PO Take 50 mg by mouth 2 (two) times a day      midodrine (PROAMATINE) 2 5 mg tablet TAKE 1 TABLET BY MOUTH TWICE A DAY 60 tablet 3    pantoprazole (PROTONIX) 40 mg tablet       simvastatin (ZOCOR) 20 mg tablet TAKE 1 TABLET DAILY AT BEDTIME   90 tablet 3  XARELTO 20 MG tablet TAKE 1 TABLET BY MOUTH EVERY DAY 30 tablet 5    AMLODIPINE BESYLATE-VALSARTAN PO Take 5 mg by mouth daily      omeprazole (PriLOSEC) 20 mg delayed release capsule Take 20 mg by mouth daily       No current facility-administered medications for this visit  Allergies   Allergen Reactions    Cephalosporins     Keflex [Cephalexin] Hives     Immunization History   Administered Date(s) Administered    Influenza 12/14/2015, 10/18/2016    Influenza Split High Dose Preservative Free IM 12/14/2015    Influenza TIV (IM) 10/16/2008, 10/18/2016, 10/20/2017    Pneumococcal Conjugate 13-Valent 01/06/2016, 05/16/2017    Pneumococcal Polysaccharide PPV23 04/21/2014, 12/20/2016       Patient Care Team:  Michael Gan MD as PCP - MD Tj Sandhu MD Mattie Courts, MD Bonnie White, Ivan Juan MD      Medicare Screening Tests and Risk Assessments:  AWV Clinical     ISAR:   Previous hospitalizations?:  No       Once in a Lifetime Medicare Screening:   EKG performed?:  No    AAA screening performed? (if performed, please add date to Health Maintenance):  No       Medicare Screening Tests and Risk Assessment:   AAA Risk Assessment     Tobacco use (males only):  No   Age over 72 (males only):  No Family history of AAA:  No   Osteoporosis Risk Assessment     Female:   Yes    Alcohol use:  Yes   FHX of fractures:  Yes    HIV Risk Assessment        Drug and Alcohol Use:   Tobacco use    Cigarettes:  never smoker    Tobacco use duration    Tobacco Cessation Readiness    Alcohol use    Alcohol use:  occasional use    Alcohol Treatment Readiness   Illicit Drug Use    Drug use:  never        Diet & Exercise:   Diet   What is your diet?:  No Added Salt   How many servings a day of the following:   Exercise    Do you currently exercise?:  yes    Frequency:  rarely    Type of exercise:  walking       Cognitive Impairment Screening:   Cognitive Impairment Screening        Functional Ability/Level of Safety:   Hearing     Bilateral:  normal   Left:  normal Right:  normal   Hearing aid:  No    Hearing Impairment Assessment    Current Activities    Status:  limited ADL's, limited driving, limited social activities   Help needed with the folllowing:    ADL    Fall Risk   Have you fallen in the last 12 months?:  Yes    How many times?:  1    Injury History       Home Safety:   Home Safety Risk Factors       Advanced Directives:   Advanced Directives    Living Will:  Yes Durable POA for healthcare: Yes   Advanced directive:  Yes    Patient's End of Life Decisions        Urinary Incontinence:       Glaucoma:            Provider Screening     Preventative Screening/Counseling:   Cardiovascular Screening/Counseling:   (Labs Q5 years, EKG optional one-time)   General:  Screening Current           Diabetes Screening/Counseling:   (2 tests/year if Pre-Diabetes or 1 test/year if no Diabetes)   General:  Screening Current           Colorectal Cancer Screening/Counseling:   (FOBT Q1 yr; Flex Sig Q4 yrs or Q10 yrs after Screening Colonoscopy; Screening Colonoscpy Q2 yrs High Risk or Q10 yrs Low Risk; Barium Enema Q2 yrs High Risk or Q4 yrs Low Risk)     Due for: Studies:  Fecal Occult Blood         Prostate Cancer Screening/Counseling:   (Annual)          Breast Cancer Screening/Counseling:   (Baseline Age 28 - 43; Annual Age 36+)    Due for: Studies:  mammogram         Cervical Cancer Screening/Counseling:   (Annual for High Risk or Childbearing Age with Abnormal Pap in Last 3 yrs;  Every 2 all others)   General:  Risks and Benefits Discussed           Osteoporosis Screening/Counseling:   (Every 2 Yrs if at risk or more if medically necessary)     Due for: Studies:  DXA Axial         AAA Screening/Counseling:   (Once per Lifetime with risk factors)    Family History of AAA:  No Age over 72 (males only):  No Tobacco use (males only):  No   General:  Screening Not Indicated           Glaucoma Screening/Counseling:   (Annual)   General:  Screening Current          HIV Screening/Counseling:   (Voluntary; Once annually for high risk OR 3 times for Pregnancy at diagnosis of IUP; 3rd trimester; and at Labor   General:  Screening Not Indicated           Hepatitis C Screening:   Hepatitis C Counseling Provided: Yes               Immunizations:   Influenza (annual): Influenza UTD This Year   Pneumococcal (Once in a Lifetime):  Lifetime Vaccine Completed   Hepatitis B Series (low risk patients):  Series Not Indicated   Zostavax (Medicare D Coverage, Pt >72 yo):  Risks & Benefits Discussed   TD (Non-Medicare Wellness  Visit required): Td Vaccine UTD   Tdap (Non-Medicare Wellness Visit required): Tdap Vaccine UTD       Other Preventative Couseling (Non-Medicare Wellness Visit Required):       Referrals (Non-Medicare Wellness Visit Required):       Medical Equipment/Suppliers:           No exam data present  Reviewed Updated [facility] Prior Wellness Visits:   Last Medicare wellness visit information was reviewed, patient interviewed , no change since last AWVno  Last Medicare wellness visit information was reviewed, patient interviewed and updates made to the record today no    Assessment and Plan:  1  Abnormal fasting glucose  Comprehensive metabolic panel    HEMOGLOBIN A1C W/ EAG ESTIMATION    Microalbumin / creatinine urine ratio   2  Hyperprolactinemia ([address] )     3  Acquired hypothyroidism  CBC and differential    TSH, 3rd generation   4  Paroxysmal atrial fibrillation (HCC)     5  Benign essential hypertension  Comprehensive metabolic panel   6  Age-related osteoporosis without current pathological fracture  DXA bone density spine hip and pelvis   7  Hypercholesterolemia  Comprehensive metabolic panel    Lipid Panel with Direct LDL reflex   8  Severe frontal headaches  butalbital-acetaminophen-caffeine (FIORICET,ESGIC) -40 mg per tablet   9   Vasovagal syncope  Cardiac event monitor   10  Screening for breast cancer     11  Screen for colon cancer     12   Vitamin D deficiency  Vitamin D 25 hydroxy       Health Maintenance Due   Topic Date Due    Hepatitis C Screening  1945    COLONOSCOPY  1945    DTaP,Tdap,and Td Vaccines (1 - Tdap) 09/13/1967    GLAUCOMA SCREENING 67+ YR  09/13/2013

## 2018-04-24 NOTE — ASSESSMENT & PLAN NOTE
Patient had holter monitor in February which showed pauses and sam cardia  Electrolytes wnl   Will order event monitor

## 2018-04-24 NOTE — ASSESSMENT & PLAN NOTE
Adequate control   she says her bps sometimes range in the 140s/90s  She will do bps twice daily and send in a log in two weeks

## 2018-05-01 ENCOUNTER — OFFICE VISIT (OUTPATIENT)
Dept: NEUROLOGY | Facility: CLINIC | Age: 73
End: 2018-05-01
Payer: MEDICARE

## 2018-05-01 VITALS
HEIGHT: 66 IN | HEART RATE: 60 BPM | WEIGHT: 168 LBS | BODY MASS INDEX: 27 KG/M2 | SYSTOLIC BLOOD PRESSURE: 132 MMHG | DIASTOLIC BLOOD PRESSURE: 84 MMHG

## 2018-05-01 DIAGNOSIS — R51.9 SEVERE FRONTAL HEADACHES: ICD-10-CM

## 2018-05-01 DIAGNOSIS — D35.2 PITUITARY ADENOMA (HCC): Primary | ICD-10-CM

## 2018-05-01 PROBLEM — D49.7 PITUITARY TUMOR: Status: ACTIVE | Noted: 2018-05-01

## 2018-05-01 PROCEDURE — 99213 OFFICE O/P EST LOW 20 MIN: CPT | Performed by: PSYCHIATRY & NEUROLOGY

## 2018-05-01 RX ORDER — BUTALBITAL, ACETAMINOPHEN AND CAFFEINE 50; 325; 40 MG/1; MG/1; MG/1
1 TABLET ORAL 2 TIMES DAILY PRN
Qty: 60 TABLET | Refills: 0 | Status: SHIPPED | OUTPATIENT
Start: 2018-05-01 | End: 2018-09-05 | Stop reason: SDUPTHER

## 2018-05-01 RX ORDER — BUTALBITAL, ACETAMINOPHEN AND CAFFEINE 50; 325; 40 MG/1; MG/1; MG/1
1 TABLET ORAL 2 TIMES DAILY PRN
Qty: 60 TABLET | Refills: 3 | Status: SHIPPED | OUTPATIENT
Start: 2018-05-01 | End: 2018-05-01 | Stop reason: SDUPTHER

## 2018-05-01 NOTE — PROGRESS NOTES
Progress Note - Neurology   Brinda Wagner 71 y o  female MRN: [id number]  Unit/Bed#:  Encounter: [id number]      Subjective:   Patient is here for a follow-up visit with a history of a pituitary adenoma, and continues to experience headaches frequently especially with changes in the barometric pressure  She uses Fioricet on a p r n  basis and since her last visit she also has lost significant weight, and had a syncopal event in the recent past   Patient was subsequently seen by her primary physician and then seen by Cardiology and a 24 hour Holter was performed which showed intermittent episodes of bradycardia and sinus pauses and is to have an event monitoring done in the near future  She denies any new motor or sensory symptoms in the upper lower extremities and also has been ambulating better without the help of assistance  ROS:   Review of Systems   Constitutional: Negative  HENT: Negative  Eyes: Negative  Respiratory: Positive for shortness of breath  Cardiovascular: Negative  Gastrointestinal: Negative  Endocrine: Negative  Genitourinary: Negative  Musculoskeletal: Negative  Skin: Negative  Allergic/Immunologic: Negative  Neurological: Positive for syncope  Hematological: Negative  Psychiatric/Behavioral: Positive for sleep disturbance  Vitals:   Vitals:    05/01/18 1229   BP: 132/84   Pulse: 60   ,Body mass index is 27 53 kg/m²      MEDS:      Current Outpatient Prescriptions:     amLODIPine (NORVASC) 5 mg tablet, TAKE 1 TABLET BY MOUTH DAILY IN THE MORNING, Disp: 90 tablet, Rfl: 3    butalbital-acetaminophen-caffeine (FIORICET,ESGIC) -40 mg per tablet, Take 1 tablet by mouth every 4 (four) hours as needed for headaches, Disp: 30 tablet, Rfl: 0    cabergoline (DOSTINEX) 0 5 MG tablet, Take 0 25 mg by mouth 2 (two) times a week, Disp: , Rfl:     Cholecalciferol (VITAMIN D) 2000 units CAPS, Take 1 tablet by mouth daily, Disp: , Rfl:     levothyroxine 25 mcg tablet, Take 1 tablet by mouth daily, Disp: , Rfl:     lisinopril (ZESTRIL) 5 mg tablet, Take 5 mg by mouth daily, Disp: , Rfl:     LORazepam (ATIVAN) 1 mg tablet, TAKE 1 TABLET BY MOUTH TWICE A DAY (Patient taking differently: TAKE 1 TABLET BY MOUTH TWICE A DAY prn), Disp: 60 tablet, Rfl: 0    METOPROLOL TARTRATE PO, Take 50 mg by mouth 2 (two) times a day, Disp: , Rfl:     midodrine (PROAMATINE) 2 5 mg tablet, TAKE 1 TABLET BY MOUTH TWICE A DAY, Disp: 60 tablet, Rfl: 3    pantoprazole (PROTONIX) 40 mg tablet, , Disp: , Rfl:     simvastatin (ZOCOR) 20 mg tablet, TAKE 1 TABLET DAILY AT BEDTIME , Disp: 90 tablet, Rfl: 3    XARELTO 20 MG tablet, TAKE 1 TABLET BY MOUTH EVERY DAY, Disp: 30 tablet, Rfl: 5    AMLODIPINE BESYLATE-VALSARTAN PO, Take 5 mg by mouth daily, Disp: , Rfl:     omeprazole (PriLOSEC) 20 mg delayed release capsule, Take 20 mg by mouth daily, Disp: , Rfl:   :    Physical Exam:  General appearance: alert, appears stated age and cooperative  Head: Normocephalic, without obvious abnormality, atraumatic    Neurologic:  Her examination shows intact extraocular movements, with no evidence of any nystagmus, pupils equally reactive, no obvious facial asymmetry, sensation in the V1 V2 V3 distribution is preserved bilaterally, and no evidence of any focal motor or sensory deficits were also noticed  She has hypoactive deep tendon reflexes  Gait is normal based  No bruits were appreciable in the neck  Lab Results: I have personally reviewed pertinent reports  Imaging Studies: I have personally reviewed pertinent reports  Assessment:  1  Headaches chronic most likely related to the pituitary macroadenoma  2  Recent syncopal event, most likely cardiogenic in origin  3  Pituitary macroadenoma  Plan:  Patient is advised to continue Fioricet on a p r n  basis, for the headaches ,she will continue follow up with Endocrinology as well as cardiology in the near future    Patient remains on Cabergoline 2 5 mg twice a day to be taken only twice a week  Her last prolactin level was within normal limits an MRI was stable done in December  She will have a follow-up MRI study in December  Patient advised to call me if her cardiac workup is negative and may require an EEG  She will follow up with me in 6 months  5/1/2018,12:37 PM    Dictation voice to text software has been used in the creation of this document  Please consider this in light of any contextual or grammatical errors

## 2018-05-10 ENCOUNTER — HOSPITAL ENCOUNTER (OUTPATIENT)
Dept: NON INVASIVE DIAGNOSTICS | Facility: CLINIC | Age: 73
Discharge: HOME | End: 2018-05-10
Payer: MEDICARE

## 2018-05-10 DIAGNOSIS — R55 VASOVAGAL SYNCOPE: ICD-10-CM

## 2018-05-11 ENCOUNTER — TELEPHONE (OUTPATIENT)
Dept: CARDIOLOGY CLINIC | Facility: CLINIC | Age: 73
End: 2018-05-11

## 2018-05-21 DIAGNOSIS — F41.9 ANXIETY: ICD-10-CM

## 2018-05-22 RX ORDER — LORAZEPAM 1 MG/1
TABLET ORAL
Qty: 60 TABLET | Refills: 0 | Status: SHIPPED | OUTPATIENT
Start: 2018-05-22 | End: 2018-11-06 | Stop reason: SDUPTHER

## 2018-05-30 PROCEDURE — 93228 REMOTE 30 DAY ECG REV/REPORT: CPT | Performed by: INTERNAL MEDICINE

## 2018-06-04 ENCOUNTER — TELEPHONE (OUTPATIENT)
Dept: INTERNAL MEDICINE CLINIC | Facility: CLINIC | Age: 73
End: 2018-06-04

## 2018-06-07 DIAGNOSIS — E03.9 HYPOTHYROIDISM, UNSPECIFIED TYPE: ICD-10-CM

## 2018-06-07 RX ORDER — LEVOTHYROXINE SODIUM 0.03 MG/1
TABLET ORAL
Qty: 90 TABLET | Refills: 0 | Status: SHIPPED | OUTPATIENT
Start: 2018-06-07 | End: 2018-09-04 | Stop reason: SDUPTHER

## 2018-06-26 ENCOUNTER — OFFICE VISIT (OUTPATIENT)
Dept: CARDIOLOGY CLINIC | Facility: CLINIC | Age: 73
End: 2018-06-26
Payer: MEDICARE

## 2018-06-26 VITALS
SYSTOLIC BLOOD PRESSURE: 118 MMHG | HEIGHT: 66 IN | BODY MASS INDEX: 27.32 KG/M2 | WEIGHT: 170 LBS | OXYGEN SATURATION: 98 % | DIASTOLIC BLOOD PRESSURE: 80 MMHG | HEART RATE: 74 BPM

## 2018-06-26 DIAGNOSIS — R55 VASOVAGAL SYNCOPE: ICD-10-CM

## 2018-06-26 DIAGNOSIS — I10 BENIGN ESSENTIAL HYPERTENSION: ICD-10-CM

## 2018-06-26 DIAGNOSIS — Z79.01 CHRONIC ANTICOAGULATION: ICD-10-CM

## 2018-06-26 DIAGNOSIS — I48.0 PAROXYSMAL ATRIAL FIBRILLATION (HCC): Primary | ICD-10-CM

## 2018-06-26 PROCEDURE — 99214 OFFICE O/P EST MOD 30 MIN: CPT | Performed by: INTERNAL MEDICINE

## 2018-06-26 NOTE — PROGRESS NOTES
[facility] [place]  [address] [facility]  [id number]  Cardiology Follow Up    Bianca Jimenez  1945  [id number]      1  Paroxysmal atrial fibrillation (HCC)     2  Benign essential hypertension     3  Vasovagal syncope     4  Chronic anticoagulation         Chief Complaint   Patient presents with    Follow-up     holter monitor        Interval History: For follow-up visit  Patient does have history of chronic atrial fibrillation on anticoagulation  Patient also has history of orthostatic hypotension with syncope on midodrine  Patient does have a pituitary tumor followed by Neurology  Patient had another episode of syncope  Patient was evaluated by Dr Dhara Maciel her primary care physician  Patient had a 2 week event monitor which showed atrial fibrillation but the heart rate response was appropriate  No significant bradycardia or pauses were noted  Patient feels much better now  Patient is on anticoagulation  Patient denies any bleeding issues  She states that she has been compliant with all her present medications      Patient Active Problem List   Diagnosis    Atrial fibrillation (HCC)    Benign essential hypertension    Chronic anticoagulation    Abnormal brain MRI    Abnormal fasting glucose    Anxiety    Blepharoptosis    Depression    Elevated prolactin level (HCC)    Esophageal ring, acquired    Fibrocystic breast disease    Gastroesophageal reflux disease with esophagitis    Severe frontal headaches    Hypercholesterolemia    Hyperprolactinemia ([address]    Hypothyroidism    Obesity    Osteoporosis    Pituitary adenoma ([address]    Prolactinoma ([address]    Third nerve palsy of left eye    Vitamin D deficiency    Vasovagal syncope    Wellness examination    Screen for colon cancer    Screening for breast cancer    Pituitary tumor     Past Medical History:   Diagnosis Date    A-fib ([address] )     Abnormal brain MRI     Anemia     last assessed: 12/14/2015    Ankle fracture     Anxiety     last assessed: 11/14/2017    Atrial fibrillation (HCC)     last assessed: 1/18/2015    Blepharoptosis     Brain mass     Cataract     Colon polyps     Depression     Disease of thyroid gland     Dysphagia     Elevated prolactin level ([facility]     Esophageal perforation     Fracture, shoulder     GERD (gastroesophageal reflux disease)     Headache     Hypercholesterolemia     Hyperlipidemia     [facility]prolactinemia ([facility]     Hypertension     Hypotension     Hypothyroidism     last assessed: 11/20/2017    Left heart failure ([facility])     Loss of smell     Lumbago with sciatica     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[facility] )     last assessed: 7/23/2015    Migraine     Obesity     Osteoporosis     Pituitary adenoma ([facility] )     last assessed: 11/20/2017    Pituitary tumor     Prolactinoma ([facility] )     Renal failure     Syncope     Third nerve palsy     Unstable balance     Vitamin D deficiency     Wrist fracture      Social History     Social History    Marital status:       Spouse name: N/A    Number of children: N/A    Years of education: completed technical school     Occupational History    Xray tech      retired     Social History Main Topics    Smoking status: Never Smoker    Smokeless tobacco: Never Used    Alcohol use Yes      Comment: social; drinks wine    Drug use: No    Sexual activity: Not on file     Other Topics Concern    Not on file     Social History Narrative    Functioning activity level-participates in moderate activities both inside and outside of the home    Lives independently          Family History   Problem Relation Age of Onset    Osteoporosis Mother     Other Mother         sepsis    Other Father         cerebellar hemorrhage; hemorrhage in brain    Hypertension Father     Thyroid disease Sister         thyroid disorder    Colon cancer Maternal Grandmother     Heart attack Maternal Grandfather         acute myocardial infarction    Stomach cancer Paternal Grandmother     Other Paternal Grandfather         urinary retention    Stroke Paternal Uncle         syndrome     Past Surgical History:   Procedure Laterality Date    APPENDECTOMY      CATARACT EXTRACTION      CORONARY ANGIOPLASTY WITH STENT PLACEMENT  01/2016    DILATION AND CURETTAGE OF UTERUS      ESOPHAGOGASTRODUODENOSCOPY  08/29/2012    diagnostic    FIXATION KYPHOPLASTY      HYSTERECTOMY      [place] ESOPHAGOGASTRODUODENOSCOPY TRANSORAL DIAGNOSTIC N/A 1/25/2018    Procedure: ESOPHAGOGASTRODUODENOSCOPY (EGD); Surgeon: Guido Thurman MD;  Location: [facility];   Service: Gastroenterology    TONSILLECTOMY      TOTAL ABDOMINAL HYSTERECTOMY W/ BILATERAL SALPINGOOPHORECTOMY      VERTEBRAL AUGMENTATION      percutaneous vertebral augmentation kyphoplasty       Current Outpatient Prescriptions:     amLODIPine (NORVASC) 5 mg tablet, TAKE 1 TABLET BY MOUTH DAILY IN THE MORNING, Disp: 90 tablet, Rfl: 3    AMLODIPINE BESYLATE-VALSARTAN PO, Take 5 mg by mouth daily, Disp: , Rfl:     butalbital-acetaminophen-caffeine (FIORICET,ESGIC) -40 mg per tablet, Take 1 tablet by mouth 2 (two) times a day as needed for headaches, Disp: 60 tablet, Rfl: 0    cabergoline (DOSTINEX) 0 5 MG tablet, Take 0 25 mg by mouth 2 (two) times a week  , Disp: , Rfl:     Cholecalciferol (VITAMIN D) 2000 units CAPS, Take 1 tablet by mouth daily, Disp: , Rfl:     levothyroxine 25 mcg tablet, Take 1 tablet by mouth daily, Disp: , Rfl:     levothyroxine 25 mcg tablet, TAKE 1 TABLET BY MOUTH EVERY DAY, Disp: 90 tablet, Rfl: 0    lisinopril (ZESTRIL) 5 mg tablet, Take 5 mg by mouth daily, Disp: , Rfl:     LORazepam (ATIVAN) 1 mg tablet, TAKE 1 TABLET BY MOUTH TWICE A DAY, Disp: 60 tablet, Rfl: 0    METOPROLOL TARTRATE PO, Take 50 mg by mouth 2 (two) times a day, Disp: , Rfl:     midodrine (PROAMATINE) 2 5 mg tablet, TAKE 1 TABLET BY MOUTH TWICE A DAY, Disp: 60 tablet, Rfl: 3    omeprazole (PriLOSEC) 20 mg delayed release capsule, Take 20 mg by mouth daily, Disp: , Rfl:     pantoprazole (PROTONIX) 40 mg tablet, , Disp: , Rfl:     simvastatin (ZOCOR) 20 mg tablet, TAKE 1 TABLET DAILY AT BEDTIME , Disp: 90 tablet, Rfl: 3    XARELTO 20 MG tablet, TAKE 1 TABLET BY MOUTH EVERY DAY, Disp: 30 tablet, Rfl: 5  Allergies   Allergen Reactions    Cephalosporins Hives    Keflex [Cephalexin] Hives       Labs:  No visits with results within 2 Month(s) from this visit  Latest known visit with results is:   Appointment on 04/18/2018   Component Date Value    Sodium 04/18/2018 141     Potassium 04/18/2018 3 6     Chloride 04/18/2018 104     CO2 04/18/2018 27     Anion Gap 04/18/2018 10     BUN 04/18/2018 14     Creatinine 04/18/2018 0 89     Glucose, Fasting 04/18/2018 109*    Calcium 04/18/2018 9 2     AST 04/18/2018 13     ALT 04/18/2018 13     Alkaline Phosphatase 04/18/2018 133*    Total Protein 04/18/2018 7 3     Albumin 04/18/2018 3 5     Total Bilirubin 04/18/2018 0 60     eGFR 04/18/2018 65     Total CK 04/18/2018 24*    Cholesterol 04/18/2018 192     Triglycerides 04/18/2018 78     HDL, Direct 04/18/2018 89*    LDL Calculated 04/18/2018 87     TSH 3RD GENERATON 04/18/2018 1 994     Hemoglobin A1C 04/18/2018 5 2     EAG 04/18/2018 103     Cortisol - AM 04/18/2018 23 5*    ACTH 04/18/2018 32 4     Free T4 04/18/2018 1 17     Prolactin 04/18/2018 16 6      Imaging: No results found      Review of Systems:  Review of Systems   REVIEW OF SYSTEMS:  Constitutional:  Denies fever or chills   Eyes:  Denies change in visual acuity   HENT:  Denies nasal congestion or sore throat   Respiratory:  Denies cough or shortness of breath   Cardiovascular:  Denies chest pain or edema   GI:  Denies abdominal pain, nausea, vomiting, bloody stools or diarrhea   :  Denies dysuria, frequency, difficulty in micturition and nocturia  Musculoskeletal:  Denies back pain or joint pain   Neurologic:  Denies headache, focal weakness or sensory changes   Endocrine:  Denies polyuria or polydipsia   Lymphatic:  Denies swollen glands   Psychiatric:  anxiety     Physical Exam:    /80   Pulse 74   Ht 5' 5 5" (1 664 m)   Wt 77 1 kg (170 lb)   SpO2 98%   BMI 27 86 kg/m²     Physical Exam   PHYSICAL EXAM:  General:  Patient is not in acute distress   Head: Normocephalic, Atraumatic  HEENT:  Both pupils normal-size atraumatic, normocephalic, nonicteric  Neck:  JVP not raised  Trachea central  No carotid bruit  Respiratory:  normal breath sounds no crackles  no rhonchi  Cardiovascular:  Irregularly irregular  GI:  Abdomen soft nontender  No organomegaly  Lymphatic:  No cervical or inguinal lymphadenopathy  Neurologic:  Patient is awake alert, oriented   Grossly nonfocal    Results of event monitor reviewed with the patient    Discussion/Summary:  Patient with multiple medical problems who seems to be doing reasonably well from cardiac standpoint  Previous studies reviewed with patient  Medications reviewed and possible side effects discussed  concepts of cardiovascular disease , signs and symptoms of heart disease  Dietary and risk factor modification reinforced  All questions answered  Safety measures reviewed  Patient advised to report any problems prompting medical attention  Etiology of syncope is unclear  Patient could very well have had orthostatic hypotension  Patient has been instructed to increase ProAmatine to 2 5 milligram 3 times a day  Symptoms to watch out from cardiac standpoint which would indicate the need for further cardiac evaluation discussed with patient  Rest of the previous cardiovascular studies also reviewed  Follow-up with primary care physician and Neurology  Medications were reviewed with the patient  Patient is agreeable with the plan of care

## 2018-06-28 DIAGNOSIS — F41.9 ANXIETY: ICD-10-CM

## 2018-06-28 RX ORDER — LORAZEPAM 1 MG/1
TABLET ORAL
Qty: 60 TABLET | Refills: 0 | Status: SHIPPED | OUTPATIENT
Start: 2018-06-28 | End: 2018-07-30 | Stop reason: SDUPTHER

## 2018-07-08 DIAGNOSIS — I10 ESSENTIAL HYPERTENSION: Primary | ICD-10-CM

## 2018-07-09 RX ORDER — LISINOPRIL 5 MG/1
TABLET ORAL
Qty: 30 TABLET | Refills: 5 | Status: SHIPPED | OUTPATIENT
Start: 2018-07-09 | End: 2018-12-05 | Stop reason: SDUPTHER

## 2018-07-10 DIAGNOSIS — D35.2 BENIGN NEOPLASM OF PITUITARY GLAND (HCC): ICD-10-CM

## 2018-07-30 DIAGNOSIS — F41.9 ANXIETY: ICD-10-CM

## 2018-07-30 RX ORDER — LORAZEPAM 1 MG/1
TABLET ORAL
Qty: 60 TABLET | Refills: 0 | Status: SHIPPED | OUTPATIENT
Start: 2018-07-30 | End: 2018-09-04 | Stop reason: SDUPTHER

## 2018-07-31 DIAGNOSIS — I95.1 ORTHOSTATIC HYPOTENSION: ICD-10-CM

## 2018-08-01 RX ORDER — MIDODRINE HYDROCHLORIDE 2.5 MG/1
TABLET ORAL
Qty: 60 TABLET | Refills: 3 | Status: SHIPPED | OUTPATIENT
Start: 2018-08-01 | End: 2018-08-28 | Stop reason: SDUPTHER

## 2018-08-22 DIAGNOSIS — I48.91 ATRIAL FIBRILLATION, UNSPECIFIED TYPE (HCC): ICD-10-CM

## 2018-08-22 RX ORDER — RIVAROXABAN 20 MG/1
TABLET, FILM COATED ORAL
Qty: 30 TABLET | Refills: 5 | Status: SHIPPED | OUTPATIENT
Start: 2018-08-22 | End: 2019-05-11 | Stop reason: SDUPTHER

## 2018-08-28 ENCOUNTER — TELEPHONE (OUTPATIENT)
Dept: CARDIOLOGY CLINIC | Facility: CLINIC | Age: 73
End: 2018-08-28

## 2018-08-28 ENCOUNTER — TELEPHONE (OUTPATIENT)
Dept: INTERNAL MEDICINE CLINIC | Facility: CLINIC | Age: 73
End: 2018-08-28

## 2018-08-28 DIAGNOSIS — I95.1 ORTHOSTATIC HYPOTENSION: ICD-10-CM

## 2018-08-28 RX ORDER — MIDODRINE HYDROCHLORIDE 2.5 MG/1
2.5 TABLET ORAL 3 TIMES DAILY
Qty: 180 TABLET | Refills: 5 | OUTPATIENT
Start: 2018-08-28 | End: 2018-09-18 | Stop reason: SDUPTHER

## 2018-08-28 NOTE — TELEPHONE ENCOUNTER
MARY RECEIVED A MESSAGE THAT PT PHARMACY [facility] ON [facility] WANTS CONFIRMATION ON DOSAGE FOR MIDODRINE  PLEASE CALL PHARMACY WITH VERBAL   [address]

## 2018-08-28 NOTE — TELEPHONE ENCOUNTER
S/w pt, she is taking Midodrine 2 5mg TID as per Dr YOUNG's last office note, confirmed with pharmacy

## 2018-09-04 DIAGNOSIS — F41.9 ANXIETY: ICD-10-CM

## 2018-09-04 DIAGNOSIS — E03.9 HYPOTHYROIDISM, UNSPECIFIED TYPE: ICD-10-CM

## 2018-09-04 RX ORDER — LORAZEPAM 1 MG/1
TABLET ORAL
Qty: 60 TABLET | Refills: 0 | Status: SHIPPED | OUTPATIENT
Start: 2018-09-04 | End: 2018-10-03 | Stop reason: SDUPTHER

## 2018-09-04 RX ORDER — LEVOTHYROXINE SODIUM 0.03 MG/1
TABLET ORAL
Qty: 90 TABLET | Refills: 0 | Status: SHIPPED | OUTPATIENT
Start: 2018-09-04 | End: 2018-11-06 | Stop reason: SDUPTHER

## 2018-09-05 DIAGNOSIS — R51.9 SEVERE FRONTAL HEADACHES: ICD-10-CM

## 2018-09-05 RX ORDER — BUTALBITAL, ACETAMINOPHEN AND CAFFEINE 50; 325; 40 MG/1; MG/1; MG/1
TABLET ORAL
Qty: 60 TABLET | Refills: 1 | Status: SHIPPED | OUTPATIENT
Start: 2018-09-05 | End: 2018-11-01 | Stop reason: SDUPTHER

## 2018-09-18 DIAGNOSIS — I95.1 ORTHOSTATIC HYPOTENSION: ICD-10-CM

## 2018-09-18 RX ORDER — MIDODRINE HYDROCHLORIDE 2.5 MG/1
2.5 TABLET ORAL 3 TIMES DAILY
Qty: 270 TABLET | Refills: 3 | OUTPATIENT
Start: 2018-09-18 | End: 2019-08-14 | Stop reason: SDUPTHER

## 2018-09-18 NOTE — TELEPHONE ENCOUNTER
PT NEEDS A REFILL ON MIDODRINE 2 5MG (90 DAY SUPPLY) SENT TO [facility] [place] [address]

## 2018-10-03 DIAGNOSIS — F41.9 ANXIETY: ICD-10-CM

## 2018-10-03 RX ORDER — LORAZEPAM 1 MG/1
TABLET ORAL
Qty: 60 TABLET | Refills: 0 | Status: SHIPPED | OUTPATIENT
Start: 2018-10-03 | End: 2018-11-01 | Stop reason: SDUPTHER

## 2018-10-08 ENCOUNTER — TELEPHONE (OUTPATIENT)
Dept: NEUROLOGY | Facility: CLINIC | Age: 73
End: 2018-10-08

## 2018-10-08 DIAGNOSIS — D35.2 PITUITARY ADENOMA (HCC): Primary | ICD-10-CM

## 2018-10-09 NOTE — TELEPHONE ENCOUNTER
Patient made aware, that MRI was ordered, transferred patient to schedule it  Mailed MRI order to patient as requested

## 2018-11-01 ENCOUNTER — APPOINTMENT (OUTPATIENT)
Dept: LAB | Facility: CLINIC | Age: 73
End: 2018-11-01
Payer: MEDICARE

## 2018-11-01 DIAGNOSIS — E55.9 VITAMIN D DEFICIENCY: ICD-10-CM

## 2018-11-01 DIAGNOSIS — F41.9 ANXIETY: ICD-10-CM

## 2018-11-01 DIAGNOSIS — R73.01 ABNORMAL FASTING GLUCOSE: ICD-10-CM

## 2018-11-01 DIAGNOSIS — E03.9 ACQUIRED HYPOTHYROIDISM: ICD-10-CM

## 2018-11-01 DIAGNOSIS — E78.00 HYPERCHOLESTEROLEMIA: ICD-10-CM

## 2018-11-01 DIAGNOSIS — I10 BENIGN ESSENTIAL HYPERTENSION: ICD-10-CM

## 2018-11-01 DIAGNOSIS — R51.9 SEVERE FRONTAL HEADACHES: ICD-10-CM

## 2018-11-01 LAB
25(OH)D3 SERPL-MCNC: 47.6 NG/ML (ref 30–100)
ALBUMIN SERPL BCP-MCNC: 3.6 G/DL (ref 3.5–5)
ALP SERPL-CCNC: 128 U/L (ref 46–116)
ALT SERPL W P-5'-P-CCNC: 11 U/L (ref 12–78)
ANION GAP SERPL CALCULATED.3IONS-SCNC: 6 MMOL/L (ref 4–13)
AST SERPL W P-5'-P-CCNC: 13 U/L (ref 5–45)
BASOPHILS # BLD AUTO: 0.1 THOUSANDS/ΜL (ref 0–0.1)
BASOPHILS NFR BLD AUTO: 1 % (ref 0–1)
BILIRUB SERPL-MCNC: 0.5 MG/DL (ref 0.2–1)
BUN SERPL-MCNC: 19 MG/DL (ref 5–25)
CALCIUM SERPL-MCNC: 9.2 MG/DL (ref 8.3–10.1)
CHLORIDE SERPL-SCNC: 106 MMOL/L (ref 100–108)
CHOLEST SERPL-MCNC: 233 MG/DL (ref 50–200)
CO2 SERPL-SCNC: 26 MMOL/L (ref 21–32)
CREAT SERPL-MCNC: 0.93 MG/DL (ref 0.6–1.3)
EOSINOPHIL # BLD AUTO: 0.29 THOUSAND/ΜL (ref 0–0.61)
EOSINOPHIL NFR BLD AUTO: 4 % (ref 0–6)
ERYTHROCYTE [DISTWIDTH] IN BLOOD BY AUTOMATED COUNT: 14 % (ref 11.6–15.1)
EST. AVERAGE GLUCOSE BLD GHB EST-MCNC: 105 MG/DL
GFR SERPL CREATININE-BSD FRML MDRD: 62 ML/MIN/1.73SQ M
GLUCOSE P FAST SERPL-MCNC: 96 MG/DL (ref 65–99)
HBA1C MFR BLD: 5.3 % (ref 4.2–6.3)
HCT VFR BLD AUTO: 35.5 % (ref 34.8–46.1)
HDLC SERPL-MCNC: 96 MG/DL (ref 40–60)
HGB BLD-MCNC: 11.5 G/DL (ref 11.5–15.4)
IMM GRANULOCYTES # BLD AUTO: 0.01 THOUSAND/UL (ref 0–0.2)
IMM GRANULOCYTES NFR BLD AUTO: 0 % (ref 0–2)
LDLC SERPL CALC-MCNC: 119 MG/DL (ref 0–100)
LYMPHOCYTES # BLD AUTO: 1.42 THOUSANDS/ΜL (ref 0.6–4.47)
LYMPHOCYTES NFR BLD AUTO: 20 % (ref 14–44)
MCH RBC QN AUTO: 31.5 PG (ref 26.8–34.3)
MCHC RBC AUTO-ENTMCNC: 32.4 G/DL (ref 31.4–37.4)
MCV RBC AUTO: 97 FL (ref 82–98)
MONOCYTES # BLD AUTO: 0.56 THOUSAND/ΜL (ref 0.17–1.22)
MONOCYTES NFR BLD AUTO: 8 % (ref 4–12)
NEUTROPHILS # BLD AUTO: 4.62 THOUSANDS/ΜL (ref 1.85–7.62)
NEUTS SEG NFR BLD AUTO: 67 % (ref 43–75)
NRBC BLD AUTO-RTO: 0 /100 WBCS
PLATELET # BLD AUTO: 198 THOUSANDS/UL (ref 149–390)
PMV BLD AUTO: 11.7 FL (ref 8.9–12.7)
POTASSIUM SERPL-SCNC: 4.1 MMOL/L (ref 3.5–5.3)
PROT SERPL-MCNC: 7.4 G/DL (ref 6.4–8.2)
RBC # BLD AUTO: 3.65 MILLION/UL (ref 3.81–5.12)
SODIUM SERPL-SCNC: 138 MMOL/L (ref 136–145)
TRIGL SERPL-MCNC: 89 MG/DL
TSH SERPL DL<=0.05 MIU/L-ACNC: 2.39 UIU/ML (ref 0.36–3.74)
WBC # BLD AUTO: 7 THOUSAND/UL (ref 4.31–10.16)

## 2018-11-01 PROCEDURE — 80053 COMPREHEN METABOLIC PANEL: CPT

## 2018-11-01 PROCEDURE — 80061 LIPID PANEL: CPT

## 2018-11-01 PROCEDURE — 84443 ASSAY THYROID STIM HORMONE: CPT

## 2018-11-01 PROCEDURE — 36415 COLL VENOUS BLD VENIPUNCTURE: CPT

## 2018-11-01 PROCEDURE — 85025 COMPLETE CBC W/AUTO DIFF WBC: CPT

## 2018-11-01 PROCEDURE — 82306 VITAMIN D 25 HYDROXY: CPT

## 2018-11-01 PROCEDURE — 83036 HEMOGLOBIN GLYCOSYLATED A1C: CPT

## 2018-11-01 RX ORDER — BUTALBITAL, ACETAMINOPHEN AND CAFFEINE 50; 325; 40 MG/1; MG/1; MG/1
TABLET ORAL
Qty: 60 TABLET | Refills: 1 | Status: SHIPPED | OUTPATIENT
Start: 2018-11-01 | End: 2018-11-06 | Stop reason: SDUPTHER

## 2018-11-01 RX ORDER — LORAZEPAM 1 MG/1
TABLET ORAL
Qty: 60 TABLET | Refills: 0 | Status: SHIPPED | OUTPATIENT
Start: 2018-11-01 | End: 2018-11-06 | Stop reason: SDUPTHER

## 2018-11-06 ENCOUNTER — OFFICE VISIT (OUTPATIENT)
Dept: INTERNAL MEDICINE CLINIC | Facility: CLINIC | Age: 73
End: 2018-11-06
Payer: MEDICARE

## 2018-11-06 VITALS
WEIGHT: 172 LBS | RESPIRATION RATE: 14 BRPM | HEIGHT: 66 IN | BODY MASS INDEX: 27.64 KG/M2 | HEART RATE: 72 BPM | DIASTOLIC BLOOD PRESSURE: 88 MMHG | SYSTOLIC BLOOD PRESSURE: 138 MMHG

## 2018-11-06 DIAGNOSIS — E78.00 HYPERCHOLESTEROLEMIA: ICD-10-CM

## 2018-11-06 DIAGNOSIS — R51.9 SEVERE FRONTAL HEADACHES: ICD-10-CM

## 2018-11-06 DIAGNOSIS — E55.9 VITAMIN D DEFICIENCY: ICD-10-CM

## 2018-11-06 DIAGNOSIS — Z12.11 SCREEN FOR COLON CANCER: ICD-10-CM

## 2018-11-06 DIAGNOSIS — D35.2 PITUITARY ADENOMA (HCC): ICD-10-CM

## 2018-11-06 DIAGNOSIS — I48.0 PAROXYSMAL ATRIAL FIBRILLATION (HCC): ICD-10-CM

## 2018-11-06 DIAGNOSIS — E03.9 ACQUIRED HYPOTHYROIDISM: Primary | ICD-10-CM

## 2018-11-06 DIAGNOSIS — Z12.39 SCREENING FOR BREAST CANCER: ICD-10-CM

## 2018-11-06 DIAGNOSIS — M81.0 AGE-RELATED OSTEOPOROSIS WITHOUT CURRENT PATHOLOGICAL FRACTURE: ICD-10-CM

## 2018-11-06 DIAGNOSIS — F41.9 ANXIETY: ICD-10-CM

## 2018-11-06 DIAGNOSIS — I10 BENIGN ESSENTIAL HYPERTENSION: ICD-10-CM

## 2018-11-06 PROCEDURE — 99214 OFFICE O/P EST MOD 30 MIN: CPT | Performed by: INTERNAL MEDICINE

## 2018-11-06 RX ORDER — LORAZEPAM 1 MG/1
1 TABLET ORAL 2 TIMES DAILY
Qty: 60 TABLET | Refills: 5 | Status: SHIPPED | OUTPATIENT
Start: 2018-11-06 | End: 2019-05-14 | Stop reason: SDUPTHER

## 2018-11-06 RX ORDER — LORAZEPAM 1 MG/1
1 TABLET ORAL 2 TIMES DAILY
Qty: 60 TABLET | Refills: 5 | Status: SHIPPED | OUTPATIENT
Start: 2018-11-06 | End: 2018-11-06 | Stop reason: SDUPTHER

## 2018-11-06 RX ORDER — BUTALBITAL, ACETAMINOPHEN AND CAFFEINE 50; 325; 40 MG/1; MG/1; MG/1
1 TABLET ORAL 2 TIMES DAILY PRN
Qty: 60 TABLET | Refills: 5 | Status: SHIPPED | OUTPATIENT
Start: 2018-11-06 | End: 2019-05-14 | Stop reason: SDUPTHER

## 2018-11-06 RX ORDER — BUTALBITAL, ACETAMINOPHEN AND CAFFEINE 50; 325; 40 MG/1; MG/1; MG/1
1 TABLET ORAL 2 TIMES DAILY PRN
Qty: 60 TABLET | Refills: 5 | Status: SHIPPED | OUTPATIENT
Start: 2018-11-06 | End: 2018-11-06 | Stop reason: SDUPTHER

## 2018-11-06 NOTE — ASSESSMENT & PLAN NOTE
Elevated ldl  She has been well controlled for the past few cycles  Will continue the same dosage and see how her labs are in 6 mths and adjust accordingly

## 2018-11-06 NOTE — PROGRESS NOTES
Assessment/Plan:    Hypothyroidism  chemically and clinically euthyroid  Follow tsh  Pituitary adenoma ([facility] )  Doing well  Follow with Endocrinology and with neurology     Atrial fibrillation ([facility] )  Rate controlled and anticoagulated  Benign essential hypertension  Well controlled  Bmp wnl     Osteoporosis  dxa ordered  Encouraged her to do it  Hypercholesterolemia  Elevated ldl  She has been well controlled for the past few cycles  Will continue the same dosage and see how her labs are in 6 mths and adjust accordingly  Screening for breast cancer  Encouraged her to get mammo  Screen for colon cancer  Discussed colonoscopy and cologuard but she declined     Vitamin D deficiency  Therapeutic on current dosage  Diagnoses and all orders for this visit:    Acquired hypothyroidism  -     Comprehensive metabolic panel; Future  -     TSH, 3rd generation; Future    Pituitary adenoma (HCC)    Paroxysmal atrial fibrillation (HCC)    Benign essential hypertension  -     Comprehensive metabolic panel; Future    Hypercholesterolemia  -     Comprehensive metabolic panel; Future  -     CK; Future  -     Lipid Panel with Direct LDL reflex; Future    Screen for colon cancer    Screening for breast cancer    Vitamin D deficiency    Age-related osteoporosis without current pathological fracture    Anxiety  -     LORazepam (ATIVAN) 1 mg tablet; Take 1 tablet (1 mg total) by mouth 2 (two) times a day    Severe frontal headaches  -     butalbital-acetaminophen-caffeine (FIORICET,ESGIC) -40 mg per tablet; Take 1 tablet by mouth 2 (two) times a day as needed for headaches (headache)          Subjective:      Patient ID: Chelsey Gutierrez is a 68 y o  female  Patient presents in follow up for his medical problems and to review recent lab work  She complains of an occasional episode of a sharp pain in her left eye  She says its short in duration           The following portions of the patient's history were reviewed and updated as appropriate: allergies, current medications, past family history, past medical history, past social history, past surgical history and problem list     Review of Systems   Constitutional: Negative for activity change, appetite change, chills, diaphoresis, fatigue, fever and unexpected weight change  HENT: Negative for congestion, dental problem, drooling, ear discharge, ear pain, facial swelling, hearing loss, mouth sores, nosebleeds, postnasal drip, rhinorrhea, sinus pain, sinus pressure, sneezing, sore throat, tinnitus, trouble swallowing and voice change  Eyes: Positive for pain  Negative for photophobia, discharge, redness, itching and visual disturbance  Respiratory: Negative for apnea, cough, choking, chest tightness, shortness of breath, wheezing and stridor  Cardiovascular: Negative for chest pain, palpitations and leg swelling  Gastrointestinal: Negative for abdominal distention, abdominal pain, anal bleeding, blood in stool, constipation, diarrhea, nausea, rectal pain and vomiting  Endocrine: Negative for cold intolerance, heat intolerance, polydipsia, polyphagia and polyuria  Genitourinary: Negative for decreased urine volume, difficulty urinating, dysuria, enuresis, flank pain, frequency, genital sores, hematuria and urgency  Musculoskeletal: Negative for arthralgias, back pain, gait problem, joint swelling, myalgias, neck pain and neck stiffness  Skin: Negative for color change, pallor, rash and wound  Allergic/Immunologic: Negative for environmental allergies, food allergies and immunocompromised state  Neurological: Negative for dizziness, tremors, seizures, syncope, facial asymmetry, speech difficulty, weakness, light-headedness, numbness and headaches  Hematological: Negative for adenopathy  Does not bruise/bleed easily  Psychiatric/Behavioral: Negative for agitation, self-injury, sleep disturbance and suicidal ideas   The patient is not nervous/anxious  Objective:      /88 (BP Location: Left arm, Patient Position: Sitting, Cuff Size: Standard)   Pulse 72   Resp 14   Ht 5' 5 5" (1 664 m)   Wt 78 kg (172 lb)   BMI 28 19 kg/m²          Physical Exam   Constitutional: She is oriented to person, place, and time  She appears well-developed and well-nourished  No distress  HENT:   Head: Normocephalic and atraumatic  Right Ear: External ear normal    Left Ear: External ear normal    Mouth/Throat: Oropharynx is clear and moist    Eyes: Pupils are equal, round, and reactive to light  Conjunctivae and EOM are normal  No scleral icterus  Neck: Normal range of motion  Neck supple  No JVD present  No tracheal deviation present  No thyromegaly present  Cardiovascular: Normal rate, regular rhythm and intact distal pulses  Exam reveals no gallop and no friction rub  No murmur heard  Pulmonary/Chest: Effort normal and breath sounds normal  No respiratory distress  She has no wheezes  She has no rales  She exhibits no tenderness  Abdominal: Soft  Bowel sounds are normal  She exhibits no distension and no mass  There is no tenderness  There is no rebound and no guarding  Musculoskeletal: Normal range of motion  She exhibits no edema, tenderness or deformity  Lymphadenopathy:     She has no cervical adenopathy  Neurological: She is alert and oriented to person, place, and time  She has normal reflexes  No cranial nerve deficit  Coordination normal    Skin: Skin is warm and dry  No rash noted  She is not diaphoretic  No erythema  No pallor  Psychiatric: She has a normal mood and affect   Her behavior is normal  Judgment and thought content normal

## 2018-11-14 ENCOUNTER — HOSPITAL ENCOUNTER (OUTPATIENT)
Dept: MRI IMAGING | Facility: CLINIC | Age: 73
Discharge: HOME | End: 2018-11-14
Payer: MEDICARE

## 2018-11-14 DIAGNOSIS — D35.2 PITUITARY ADENOMA (HCC): ICD-10-CM

## 2018-11-14 PROCEDURE — 70553 MRI BRAIN STEM W/O & W/DYE: CPT

## 2018-11-14 PROCEDURE — A9585 GADOBUTROL INJECTION: HCPCS | Performed by: PSYCHIATRY & NEUROLOGY

## 2018-11-14 RX ADMIN — GADOBUTROL 7 ML: 604.72 INJECTION INTRAVENOUS at 13:26

## 2018-11-15 ENCOUNTER — TELEPHONE (OUTPATIENT)
Dept: NEUROLOGY | Facility: CLINIC | Age: 73
End: 2018-11-15

## 2018-11-15 NOTE — TELEPHONE ENCOUNTER
Discussed with patient MRI scan of the brain findings, advised her to see the neurosurgeon and the vascular surgeon, she has seen a neurosurgeon in the remote past and would like to hold off for now and will discuss with Dr Eduardo Minaya next week, she also has an appointment to see him in the next 1-2 weeks

## 2018-11-15 NOTE — TELEPHONE ENCOUNTER
MRI brain, significant findings  sherrell aware  Dr Eliceo Strange on vacation, she will notify another MD

## 2018-11-22 DIAGNOSIS — I10 ESSENTIAL HYPERTENSION: Primary | ICD-10-CM

## 2018-11-23 RX ORDER — METOPROLOL TARTRATE 50 MG/1
TABLET, FILM COATED ORAL
Qty: 60 TABLET | Refills: 0 | Status: SHIPPED | OUTPATIENT
Start: 2018-11-23 | End: 2018-12-24 | Stop reason: SDUPTHER

## 2018-11-28 DIAGNOSIS — K21.9 CHRONIC GERD: Primary | ICD-10-CM

## 2018-11-28 RX ORDER — PANTOPRAZOLE SODIUM 40 MG/1
TABLET, DELAYED RELEASE ORAL
Qty: 90 TABLET | Refills: 3 | Status: SHIPPED | OUTPATIENT
Start: 2018-11-28 | End: 2019-11-21 | Stop reason: SDUPTHER

## 2018-12-01 DIAGNOSIS — E03.9 HYPOTHYROIDISM, UNSPECIFIED TYPE: ICD-10-CM

## 2018-12-03 RX ORDER — LEVOTHYROXINE SODIUM 0.03 MG/1
TABLET ORAL
Qty: 90 TABLET | Refills: 0 | Status: SHIPPED | OUTPATIENT
Start: 2018-12-03 | End: 2019-02-25 | Stop reason: SDUPTHER

## 2018-12-05 ENCOUNTER — APPOINTMENT (OUTPATIENT)
Dept: LAB | Facility: HOSPITAL | Age: 73
End: 2018-12-05
Payer: MEDICARE

## 2018-12-05 ENCOUNTER — TRANSCRIBE ORDERS (OUTPATIENT)
Dept: ADMINISTRATIVE | Facility: HOSPITAL | Age: 73
End: 2018-12-05

## 2018-12-05 DIAGNOSIS — D44.4 NEOPLASM OF UNCERTAIN BEHAVIOR OF PITUITARY GLAND AND CRANIOPHARYNGEAL DUCT (HCC): Primary | ICD-10-CM

## 2018-12-05 DIAGNOSIS — D44.4 NEOPLASM OF UNCERTAIN BEHAVIOR OF PITUITARY GLAND AND CRANIOPHARYNGEAL DUCT (HCC): ICD-10-CM

## 2018-12-05 DIAGNOSIS — D44.3 NEOPLASM OF UNCERTAIN BEHAVIOR OF PITUITARY GLAND AND CRANIOPHARYNGEAL DUCT (HCC): Primary | ICD-10-CM

## 2018-12-05 DIAGNOSIS — D35.2 BENIGN NEOPLASM OF PITUITARY GLAND (HCC): ICD-10-CM

## 2018-12-05 DIAGNOSIS — I10 ESSENTIAL HYPERTENSION: ICD-10-CM

## 2018-12-05 DIAGNOSIS — D44.3 NEOPLASM OF UNCERTAIN BEHAVIOR OF PITUITARY GLAND AND CRANIOPHARYNGEAL DUCT (HCC): ICD-10-CM

## 2018-12-05 LAB
CORTIS AM PEAK SERPL-MCNC: 20.5 UG/DL (ref 4.2–22.4)
PROLACTIN SERPL-MCNC: 60.5 NG/ML

## 2018-12-05 PROCEDURE — 82533 TOTAL CORTISOL: CPT

## 2018-12-05 PROCEDURE — 84146 ASSAY OF PROLACTIN: CPT

## 2018-12-05 PROCEDURE — 82024 ASSAY OF ACTH: CPT

## 2018-12-05 PROCEDURE — 36415 COLL VENOUS BLD VENIPUNCTURE: CPT

## 2018-12-06 ENCOUNTER — TELEPHONE (OUTPATIENT)
Dept: NEUROLOGY | Facility: CLINIC | Age: 73
End: 2018-12-06

## 2018-12-06 ENCOUNTER — OFFICE VISIT (OUTPATIENT)
Dept: NEUROLOGY | Facility: CLINIC | Age: 73
End: 2018-12-06
Payer: MEDICARE

## 2018-12-06 VITALS
HEART RATE: 48 BPM | SYSTOLIC BLOOD PRESSURE: 118 MMHG | HEIGHT: 65 IN | BODY MASS INDEX: 28.49 KG/M2 | DIASTOLIC BLOOD PRESSURE: 80 MMHG | WEIGHT: 171 LBS

## 2018-12-06 DIAGNOSIS — E22.1 HYPERPROLACTINEMIA (HCC): ICD-10-CM

## 2018-12-06 DIAGNOSIS — R51.9 SEVERE FRONTAL HEADACHES: ICD-10-CM

## 2018-12-06 DIAGNOSIS — D35.2 PITUITARY ADENOMA (HCC): Primary | ICD-10-CM

## 2018-12-06 DIAGNOSIS — I65.22 STENOSIS OF LEFT CAROTID ARTERY: ICD-10-CM

## 2018-12-06 PROBLEM — D49.7 PITUITARY TUMOR: Status: RESOLVED | Noted: 2018-05-01 | Resolved: 2018-12-06

## 2018-12-06 LAB — ACTH PLAS-MCNC: 54.6 PG/ML (ref 7.2–63.3)

## 2018-12-06 PROCEDURE — 99214 OFFICE O/P EST MOD 30 MIN: CPT | Performed by: PSYCHIATRY & NEUROLOGY

## 2018-12-06 RX ORDER — LISINOPRIL 5 MG/1
5 TABLET ORAL DAILY
Qty: 90 TABLET | Refills: 0 | Status: SHIPPED | OUTPATIENT
Start: 2018-12-06 | End: 2019-04-08 | Stop reason: SDUPTHER

## 2018-12-06 RX ORDER — BROMOCRIPTINE MESYLATE 2.5 MG/1
2.5 TABLET ORAL 2 TIMES DAILY
Qty: 60 TABLET | Refills: 3 | Status: SHIPPED | OUTPATIENT
Start: 2018-12-06 | End: 2018-12-06 | Stop reason: SDUPTHER

## 2018-12-06 RX ORDER — BROMOCRIPTINE MESYLATE 2.5 MG/1
2.5 TABLET ORAL 2 TIMES DAILY
Qty: 60 TABLET | Refills: 0 | Status: SHIPPED | OUTPATIENT
Start: 2018-12-06 | End: 2019-05-01 | Stop reason: SDUPTHER

## 2018-12-06 RX ORDER — BROMOCRIPTINE MESYLATE 2.5 MG/1
2.5 TABLET ORAL 2 TIMES DAILY
Qty: 60 TABLET | Refills: 0 | Status: SHIPPED | OUTPATIENT
Start: 2018-12-06 | End: 2018-12-06 | Stop reason: SDUPTHER

## 2018-12-06 NOTE — PROGRESS NOTES
Progress Note - Neurology   Geoffrey Perez 68 y o  female MRN: [id number]  Unit/Bed#:  Encounter: [id number]      Subjective:   Patient is here for a follow-up visit with a history of a pituitary adenoma, prolactin secreting, and recently had a follow-up MRI of the brain done which showed evidence of slight enlargement of the adenoma and suspected to be  a meningioma with luminal narrowing of the left cavernous internal carotid artery  Patient was also seen by her endocrinologist and had a prolactin level which was elevated at 60  In the recent past the cabergoline has been reduced to once a week and was discontinued for a period of time  Patient continues to experience headaches in the left retro-orbital head region and recently has noticed sharp pains in the left retro-orbital head region which are very brief in duration  She uses Fioricet up to 2 times a day which gives her relief from the headache  Denies any other neurological symptoms  Patient has not followed up with Neurosurgery in over a year  ROS:   Review of Systems   Constitutional: Negative  Negative for appetite change and fever  HENT: Negative  Negative for hearing loss, tinnitus, trouble swallowing and voice change  Eyes: Positive for pain  Negative for photophobia  Respiratory: Negative  Negative for shortness of breath  Cardiovascular: Negative  Negative for chest pain, palpitations and leg swelling  Gastrointestinal: Negative  Negative for abdominal pain, constipation, diarrhea, nausea and vomiting  Endocrine: Negative  Negative for cold intolerance and heat intolerance  Genitourinary: Negative  Negative for dysuria, frequency and urgency  Musculoskeletal: Negative  Negative for back pain, gait problem, myalgias and neck pain  Skin: Negative  Negative for rash  Neurological: Positive for headaches   Negative for dizziness, tremors, seizures, syncope, facial asymmetry, speech difficulty, weakness, light-headedness and numbness  Hematological: Negative  Does not bruise/bleed easily  Psychiatric/Behavioral: Negative  Negative for confusion, dysphoric mood, hallucinations and sleep disturbance  The patient is not nervous/anxious  Vitals:   Vitals:    12/06/18 1155   BP: 118/80   Pulse: (!) 48   ,Body mass index is 28 9 kg/m²      MEDS:      Current Outpatient Prescriptions:     amLODIPine (NORVASC) 5 mg tablet, TAKE 1 TABLET BY MOUTH DAILY IN THE MORNING, Disp: 90 tablet, Rfl: 3    butalbital-acetaminophen-caffeine (FIORICET,ESGIC) -40 mg per tablet, Take 1 tablet by mouth 2 (two) times a day as needed for headaches (headache), Disp: 60 tablet, Rfl: 5    cabergoline (DOSTINEX) 0 5 MG tablet, Take 0 25 mg by mouth once  , Disp: , Rfl:     Cholecalciferol (VITAMIN D) 2000 units CAPS, Take 1 tablet by mouth daily, Disp: , Rfl:     levothyroxine 25 mcg tablet, TAKE 1 TABLET BY MOUTH EVERY DAY, Disp: 90 tablet, Rfl: 0    lisinopril (ZESTRIL) 5 mg tablet, Take 1 tablet (5 mg total) by mouth daily, Disp: 90 tablet, Rfl: 0    LORazepam (ATIVAN) 1 mg tablet, Take 1 tablet (1 mg total) by mouth 2 (two) times a day (Patient taking differently: Take 1 mg by mouth 2 (two) times a day as needed  ), Disp: 60 tablet, Rfl: 5    metoprolol tartrate (LOPRESSOR) 50 mg tablet, TAKE 1 TABLET BY MOUTH TWICE A DAY, Disp: 60 tablet, Rfl: 0    midodrine (PROAMATINE) 2 5 mg tablet, Take 1 tablet (2 5 mg total) by mouth 3 (three) times a day, Disp: 270 tablet, Rfl: 3    pantoprazole (PROTONIX) 40 mg tablet, TAKE 1 TABLET BY MOUTH 30 MINUTES BEFORE BREAKFAST DAILY, Disp: 90 tablet, Rfl: 3    simvastatin (ZOCOR) 20 mg tablet, TAKE 1 TABLET DAILY AT BEDTIME , Disp: 90 tablet, Rfl: 3    XARELTO 20 MG tablet, TAKE 1 TABLET BY MOUTH EVERY DAY, Disp: 30 tablet, Rfl: 5  :    Physical Exam:  General appearance: alert, appears stated age and cooperative  Head: Normocephalic, without obvious abnormality, atraumatic    Neurologic:  On neurological examination there is no evidence of any extraocular movement defect, no other cranial nerve deficit was noted, no new deficits were noted on motor and sensory exam   Deep tendon reflexes are hypoactive and her gait is normal based  Lab Results: I have personally reviewed pertinent reports  Imaging Studies: I have personally reviewed pertinent reports  Assessment:  1  Pituitary macroadenoma  With recurrent left frontal headaches  Left internal carotid cavernous portion stenosis  Hyperprolactinemia secondary to above  Plan:  Patient is advised to start bromocriptine 2 5 mg twice a day, and advised to discontinue cabergolin  Will get a CTA of the head and neck, and a BMP prior to that and patient is also advised to follow up with Neurosurgery  She will return back to see me in 2-3 months  Will get a prolactin level 1 week prior to her next visit  12/6/2018,12:01 PM    Dictation voice to text software has been used in the creation of this document  Please consider this in light of any contextual or grammatical errors

## 2018-12-11 ENCOUNTER — OFFICE VISIT (OUTPATIENT)
Dept: CARDIOLOGY CLINIC | Facility: CLINIC | Age: 73
End: 2018-12-11
Payer: MEDICARE

## 2018-12-11 VITALS
HEIGHT: 65 IN | DIASTOLIC BLOOD PRESSURE: 70 MMHG | HEART RATE: 68 BPM | BODY MASS INDEX: 28.59 KG/M2 | WEIGHT: 171.6 LBS | OXYGEN SATURATION: 99 % | SYSTOLIC BLOOD PRESSURE: 144 MMHG

## 2018-12-11 DIAGNOSIS — E78.00 HYPERCHOLESTEROLEMIA: ICD-10-CM

## 2018-12-11 DIAGNOSIS — I48.20 CHRONIC ATRIAL FIBRILLATION (HCC): Primary | ICD-10-CM

## 2018-12-11 DIAGNOSIS — Z79.01 CHRONIC ANTICOAGULATION: ICD-10-CM

## 2018-12-11 DIAGNOSIS — I10 BENIGN ESSENTIAL HYPERTENSION: ICD-10-CM

## 2018-12-11 PROCEDURE — 99214 OFFICE O/P EST MOD 30 MIN: CPT | Performed by: INTERNAL MEDICINE

## 2018-12-11 NOTE — PROGRESS NOTES
[facility] [place]  [address] [facility]  [id number]  Cardiology Follow Up    Tere Lau  1945  [id number]      1  Chronic atrial fibrillation ([address] )     2  Benign essential hypertension     3  Chronic anticoagulation     4  Hypercholesterolemia         Chief Complaint   Patient presents with    Follow-up       Interval History:  Patient presents for follow-up visit  Patient denies any chest pain or shortness of breath out of the ordinary  No history of leg edema orthopnea PND  No history of presyncope syncope  She is doing much better with the ProAmatine for orthostatic hypotension  Patient was seen by Neurology recently  Patient is getting a CT of the neck and had next month  Patient denies any bleeding issues       Patient Active Problem List   Diagnosis    Atrial fibrillation (HCC)    Benign essential hypertension    Chronic anticoagulation    Abnormal brain MRI    Abnormal fasting glucose    Anxiety    Blepharoptosis    Depression    Elevated prolactin level (HCC)    Esophageal ring, acquired    Fibrocystic breast disease    Gastroesophageal reflux disease with esophagitis    Severe frontal headaches    Hypercholesterolemia    Hyperprolactinemia (HCC)    Hypothyroidism    Obesity    Osteoporosis    Pituitary adenoma ([address] )    Prolactinoma ([address] )    Third nerve palsy of left eye    Vitamin D deficiency    Vasovagal syncope    Wellness examination    Screen for colon cancer    Screening for breast cancer     Past Medical History:   Diagnosis Date    A-fib ([address] )     Abnormal brain MRI     Anemia     last assessed: 12/14/2015    Ankle fracture     Anxiety     last assessed: 11/14/2017    Atrial fibrillation ([address] )     last assessed: 1/18/2015    Blepharoptosis     Brain mass     Cataract     Colon polyps     Depression     Disease of thyroid gland     Dysphagia     Elevated prolactin level ([address] )     Esophageal perforation     Fracture, shoulder     GERD (gastroesophageal reflux disease)     Headache     Hypercholesterolemia     Hyperlipidemia     Hyperprolactinemia (HCC)     Hypertension     Hypotension     Hypothyroidism     last assessed: 11/20/2017    Left heart failure (HCC)     Loss of smell     Lumbago with sciatica     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[facility] )     last assessed: 7/23/2015    Migraine     Obesity     Osteoporosis     Pituitary adenoma ([facility] )     last assessed: 11/20/2017    Pituitary tumor     Prolactinoma ([facility] )     Renal failure     Syncope     Third nerve palsy     Unstable balance     Vitamin D deficiency     Wrist fracture      Social History     Social History    Marital status:       Spouse name: N/A    Number of children: N/A    Years of education: completed technical school     Occupational History    Xray tech      retired     Social History Main Topics    Smoking status: Never Smoker    Smokeless tobacco: Never Used    Alcohol use Yes      Comment: social; drinks wine    Drug use: No    Sexual activity: Not on file     Other Topics Concern    Not on file     Social History Narrative    Functioning activity level-participates in moderate activities both inside and outside of the home    Lives independently          Family History   Problem Relation Age of Onset    Osteoporosis Mother     Other Mother         sepsis    Other Father         cerebellar hemorrhage; hemorrhage in brain    Hypertension Father     Thyroid disease Sister         thyroid disorder    Colon cancer Maternal Grandmother     Heart attack Maternal Grandfather         acute myocardial infarction    Stomach cancer Paternal Grandmother     Other Paternal Grandfather         urinary retention    Stroke Paternal Uncle         syndrome     Past Surgical History:   Procedure Laterality Date    APPENDECTOMY      CATARACT EXTRACTION      CORONARY ANGIOPLASTY WITH STENT PLACEMENT  01/2016    DILATION AND CURETTAGE OF UTERUS  ESOPHAGOGASTRODUODENOSCOPY  08/29/2012    diagnostic    FIXATION KYPHOPLASTY      HYSTERECTOMY      CA ESOPHAGOGASTRODUODENOSCOPY TRANSORAL DIAGNOSTIC N/A 1/25/2018    Procedure: ESOPHAGOGASTRODUODENOSCOPY (EGD); Surgeon: Rosa James MD;  Location: [facility];   Service: Gastroenterology    TONSILLECTOMY      TOTAL ABDOMINAL HYSTERECTOMY W/ BILATERAL SALPINGOOPHORECTOMY      VERTEBRAL AUGMENTATION      percutaneous vertebral augmentation kyphoplasty       Current Outpatient Prescriptions:     amLODIPine (NORVASC) 5 mg tablet, TAKE 1 TABLET BY MOUTH DAILY IN THE MORNING, Disp: 90 tablet, Rfl: 3    bromocriptine (PARLODEL) 2 5 mg tablet, Take 1 tablet (2 5 mg total) by mouth 2 (two) times a day, Disp: 60 tablet, Rfl: 0    butalbital-acetaminophen-caffeine (FIORICET,ESGIC) -40 mg per tablet, Take 1 tablet by mouth 2 (two) times a day as needed for headaches (headache), Disp: 60 tablet, Rfl: 5    Cholecalciferol (VITAMIN D) 2000 units CAPS, Take 1 tablet by mouth daily, Disp: , Rfl:     levothyroxine 25 mcg tablet, TAKE 1 TABLET BY MOUTH EVERY DAY, Disp: 90 tablet, Rfl: 0    lisinopril (ZESTRIL) 5 mg tablet, Take 1 tablet (5 mg total) by mouth daily, Disp: 90 tablet, Rfl: 0    LORazepam (ATIVAN) 1 mg tablet, Take 1 tablet (1 mg total) by mouth 2 (two) times a day (Patient taking differently: Take 1 mg by mouth 2 (two) times a day as needed  ), Disp: 60 tablet, Rfl: 5    metoprolol tartrate (LOPRESSOR) 50 mg tablet, TAKE 1 TABLET BY MOUTH TWICE A DAY, Disp: 60 tablet, Rfl: 0    midodrine (PROAMATINE) 2 5 mg tablet, Take 1 tablet (2 5 mg total) by mouth 3 (three) times a day, Disp: 270 tablet, Rfl: 3    pantoprazole (PROTONIX) 40 mg tablet, TAKE 1 TABLET BY MOUTH 30 MINUTES BEFORE BREAKFAST DAILY, Disp: 90 tablet, Rfl: 3    simvastatin (ZOCOR) 20 mg tablet, TAKE 1 TABLET DAILY AT BEDTIME , Disp: 90 tablet, Rfl: 3    XARELTO 20 MG tablet, TAKE 1 TABLET BY MOUTH EVERY DAY, Disp: 30 tablet, Rfl: 5  Allergies   Allergen Reactions    Cephalosporins Hives    Keflex [Cephalexin] Hives       Labs:  Appointment on 12/05/2018   Component Date Value    Prolactin 12/05/2018 60 5     Cortisol - AM 12/05/2018 20 5     ACTH 12/05/2018 54 6    Appointment on 11/01/2018   Component Date Value    Sodium 11/01/2018 138     Potassium 11/01/2018 4 1     Chloride 11/01/2018 106     CO2 11/01/2018 26     ANION GAP 11/01/2018 6     BUN 11/01/2018 19     Creatinine 11/01/2018 0 93     Glucose, Fasting 11/01/2018 96     Calcium 11/01/2018 9 2     AST 11/01/2018 13     ALT 11/01/2018 11*    Alkaline Phosphatase 11/01/2018 128*    Total Protein 11/01/2018 7 4     Albumin 11/01/2018 3 6     Total Bilirubin 11/01/2018 0 50     eGFR 11/01/2018 62     WBC 11/01/2018 7 00     RBC 11/01/2018 3 65*    Hemoglobin 11/01/2018 11 5     Hematocrit 11/01/2018 35 5     MCV 11/01/2018 97     MCH 11/01/2018 31 5     MCHC 11/01/2018 32 4     RDW 11/01/2018 14 0     MPV 11/01/2018 11 7     Platelets 54/39/3060 198     nRBC 11/01/2018 0     Neutrophils Relative 11/01/2018 67     Immat GRANS % 11/01/2018 0     Lymphocytes Relative 11/01/2018 20     Monocytes Relative 11/01/2018 8     Eosinophils Relative 11/01/2018 4     Basophils Relative 11/01/2018 1     Neutrophils Absolute 11/01/2018 4 62     Immature Grans Absolute 11/01/2018 0 01     Lymphocytes Absolute 11/01/2018 1 42     Monocytes Absolute 11/01/2018 0 56     Eosinophils Absolute 11/01/2018 0 29     Basophils Absolute 11/01/2018 0 10     TSH 3RD GENERATON 11/01/2018 2 390     Cholesterol 11/01/2018 233*    Triglycerides 11/01/2018 89     HDL, Direct 11/01/2018 96*    LDL Calculated 11/01/2018 119*    Hemoglobin A1C 11/01/2018 5 3     EAG 11/01/2018 105     Vit D, 25-Hydroxy 11/01/2018 47 6      Imaging: Mri Brain Pituitary Wo And W Contrast    Result Date: 11/15/2018  Narrative: MRI BRAIN AND SELLA  WITH AND WITHOUT CONTRAST INDICATION: D35 2: Benign neoplasm of pituitary gland COMPARISON: Several prior studies most recent dated December 15, 2017  TECHNIQUE: Brain: Axial diffusion-weighted imaging  Axial FLAIR and axial T2  Axial gradient  Axial T1 postcontrast Axial bravo postcontrast  Sella: Sagittal and coronal T1  Coronal T2  Sagittal and coronal T1 postcontrast with fat suppression  Coronal dynamic enhancement  Targeted images of the sella were performed requiring additional time at acquisition and interpretation of approximately 25% IV Contrast:  7 mL of Gadobutrol injection (SINGLE-DOSE) IMAGE QUALITY:  Diagnostic  FINDINGS: BRAIN PARENCHYMA:  There is no discrete mass, mass effect or midline shift  There are foci of T2 and FLAIR signal abnormality in the periventricular and subcortical white matter which are typical for microvascular disease in patients of this age group    Brainstem and cerebellum demonstrate normal signal  There is no intracranial hemorrhage  There is no evidence of acute infarction and diffusion imaging is unremarkable  VENTRICLES:  Prominent representing age-appropriate cerebral volume loss  SELLA AND PITUITARY GLAND:  Again identified is an enhancing mass centered in the cavernous sinus on the left  This mass extends into the sella with deviation of the pituitary stalk to the right  There is no extension into the suprasellar cistern  Over the past 2 studies, there is progressive slight narrowing of the luminal diameter of the cavernous left ICA  Differential considerations include carotid stenosis in the neck versus a focal stenosis in the cavernous sinus secondary to the mass in which case, this mass would more likely represent meningioma rather than pituitary macroadenoma  Initial follow-up imaging with CT angiogram of the neck is recommended to evaluate for proximal stenosis    The mass is difficult to measure secondary to its  morphology however measures at least 1 7 cm in craniocaudad dimension and 2 4 cm in transverse dimension and 2 1 cm in AP dimension  ORBITS:  Bilateral lens replacements PARANASAL SINUSES:  Minimal mucosal thickening bilateral maxillary sinuses  VASCULATURE:  Evaluation of the major intracranial vasculature demonstrates appropriate flow voids  CALVARIUM AND SKULL BASE:  Normal  EXTRACRANIAL SOFT TISSUES:  Normal      Impression: There is a enhancing mass identified in the sella/left cavernous sinus now narrowing the lumen of the cavernous carotid artery  This luminal narrowing is new over the past several studies  Differential considerations include luminal narrowing secondary  to the mass versus carotid stenosis in the neck  Further evaluation with CT angiogram of the neck is recommended  If there is no significant carotid stenosis in the neck, then luminal narrowing secondary to mass should be considered in which case, the  mass likely represents meningioma rather than pituitary macroadenoma  Scattered mild chronic small vessel ischemic changes similar to prior study  The study was marked in EPIC for significant notification   Workstation performed: [id number]       Review of Systems:  Review of Systems   REVIEW OF SYSTEMS:  Constitutional:  Denies fever or chills   Eyes:  Denies change in visual acuity   HENT:  Denies nasal congestion or sore throat   Respiratory:  Denies cough or shortness of breath   Cardiovascular:  Denies chest pain or edema   GI:  Denies abdominal pain, nausea, vomiting, bloody stools or diarrhea   :  Denies dysuria, frequency, difficulty in micturition and nocturia  Musculoskeletal:  Denies back pain or joint pain   Neurologic:  Denies headache, focal weakness or sensory changes   Endocrine:  Denies polyuria or polydipsia   Lymphatic:  Denies swollen glands   Psychiatric:  Denies depression or anxiety     Physical Exam:    /70   Pulse 68   Ht 5' 4 5" (1 638 m)   Wt 77 8 kg (171 lb 9 6 oz)   SpO2 99%   BMI 29 00 kg/m²     Physical Exam   PHYSICAL EXAM:  General:  Patient is not in acute distress   Head: Normocephalic, Atraumatic  HEENT:  Both pupils normal-size atraumatic, normocephalic, nonicteric  Neck:  JVP not raised  Trachea central  No carotid bruit  Respiratory:  normal breath sounds no crackles  no rhonchi  Cardiovascular:  Irregularly irregular  GI:  Abdomen soft nontender  No organomegaly  Lymphatic:  No cervical or inguinal lymphadenopathy  Neurologic:  Patient is awake alert, oriented   Grossly nonfocal    Discussion/Summary:  Patient with multiple medical problems who seems to be doing reasonably well from cardiac standpoint  Previous studies reviewed with patient  Medications reviewed and possible side effects discussed  concepts of cardiovascular disease , signs and symptoms of heart disease  Dietary and risk factor modification reinforced  All questions answered  Safety measures reviewed  Patient advised to report any problems prompting medical attention  Patient understands the risks and benefits of anticoagulation to prevent thromboembolic risk  Patient report any bleeding issues  Patient does have history of pituitary adenoma  Patient to follow-up with neurology  Patient had a few questions which were answered  Importance of adequate hydration reinforced  Follow-up in a few months or earlier as needed  Patient is agreeable with the plan of care

## 2018-12-12 DIAGNOSIS — D35.2 PITUITARY ADENOMA (HCC): Primary | ICD-10-CM

## 2018-12-13 ENCOUNTER — APPOINTMENT (OUTPATIENT)
Dept: LAB | Facility: CLINIC | Age: 73
End: 2018-12-13
Payer: MEDICARE

## 2018-12-13 DIAGNOSIS — I65.22 STENOSIS OF LEFT CAROTID ARTERY: ICD-10-CM

## 2018-12-13 LAB
ANION GAP SERPL CALCULATED.3IONS-SCNC: 12 MMOL/L (ref 4–13)
BUN SERPL-MCNC: 14 MG/DL (ref 5–25)
CALCIUM SERPL-MCNC: 9.5 MG/DL (ref 8.3–10.1)
CHLORIDE SERPL-SCNC: 106 MMOL/L (ref 100–108)
CO2 SERPL-SCNC: 23 MMOL/L (ref 21–32)
CREAT SERPL-MCNC: 0.98 MG/DL (ref 0.6–1.3)
GFR SERPL CREATININE-BSD FRML MDRD: 58 ML/MIN/1.73SQ M
GLUCOSE SERPL-MCNC: 92 MG/DL (ref 65–140)
POTASSIUM SERPL-SCNC: 4.5 MMOL/L (ref 3.5–5.3)
SODIUM SERPL-SCNC: 141 MMOL/L (ref 136–145)

## 2018-12-13 PROCEDURE — 80048 BASIC METABOLIC PNL TOTAL CA: CPT

## 2018-12-13 PROCEDURE — 36415 COLL VENOUS BLD VENIPUNCTURE: CPT

## 2018-12-17 ENCOUNTER — HOSPITAL ENCOUNTER (OUTPATIENT)
Dept: CT IMAGING | Facility: HOSPITAL | Age: 73
Discharge: HOME | End: 2018-12-17

## 2018-12-17 ENCOUNTER — HOSPITAL ENCOUNTER (OUTPATIENT)
Dept: CT IMAGING | Facility: CLINIC | Age: 73
Discharge: HOME | End: 2018-12-17
Attending: PSYCHIATRY & NEUROLOGY

## 2018-12-17 ENCOUNTER — TELEPHONE (OUTPATIENT)
Dept: NEUROLOGY | Facility: CLINIC | Age: 73
End: 2018-12-17

## 2018-12-17 DIAGNOSIS — I65.22 STENOSIS OF LEFT CAROTID ARTERY: ICD-10-CM

## 2018-12-17 NOTE — TELEPHONE ENCOUNTER
Received call from [phone], [phone] scan, stating that they are unable to gain IV access and therefore unable to complete study today  Patient concerned as she has appt tomorrow with Dr Cale Corbett and she felt CT should be complete prior  Call placed to Dr Cheryl Mcdaniel who states that if unable to complete study today she can reschedule for next available appt  He advised that she keep her appt tomorrow with Dr Cale Corbett and states that he will call patient  Return call made to [phone] who states he will make patient aware of above

## 2018-12-17 NOTE — TELEPHONE ENCOUNTER
Called [phone], advised to follow up with Dr Del Angel Mess she can get the CT scan angio at a later time

## 2018-12-18 ENCOUNTER — OFFICE VISIT (OUTPATIENT)
Dept: NEUROSURGERY | Facility: CLINIC | Age: 73
End: 2018-12-18
Payer: MEDICARE

## 2018-12-18 ENCOUNTER — TRANSCRIBE ORDERS (OUTPATIENT)
Dept: NEUROSURGERY | Facility: CLINIC | Age: 73
End: 2018-12-18

## 2018-12-18 VITALS
RESPIRATION RATE: 16 BRPM | DIASTOLIC BLOOD PRESSURE: 89 MMHG | TEMPERATURE: 97.9 F | BODY MASS INDEX: 28.57 KG/M2 | HEART RATE: 68 BPM | WEIGHT: 171.5 LBS | HEIGHT: 65 IN | SYSTOLIC BLOOD PRESSURE: 159 MMHG

## 2018-12-18 DIAGNOSIS — D35.2 PITUITARY ADENOMA (HCC): ICD-10-CM

## 2018-12-18 DIAGNOSIS — D35.2 BENIGN NEOPLASM OF PITUITARY GLAND (HCC): Primary | ICD-10-CM

## 2018-12-18 PROCEDURE — 99204 OFFICE O/P NEW MOD 45 MIN: CPT | Performed by: NEUROLOGICAL SURGERY

## 2018-12-18 NOTE — LETTER
December 18, 2018     Jose Varner MD  [facility] [address] [place] [address]    Patient: Robyn Anders   YOB: 1945   Date of Visit: 12/18/2018       Dear Dr Caroline Haider:    Thank you for referring Robyn Anders to me for evaluation  Below are my notes for this consultation  If you have questions, please do not hesitate to call me  I look forward to following your patient along with you  Sincerely,        Omar Francois MD        CC: No Recipients  Omar Francois MD  12/18/2018  6:50 PM  Sign at close encounter  Neurosurgery Office Note  Robyn Anders is a 68 y o  female    Type of Visit: Consult        Diagnoses and all orders for this visit:    Pituitary adenoma [facility])  -     Ambulatory referral to Neurosurgery  -     MRI brain pituitary wo and w contrast; Future  -     Prolactin; Future  -     Prolactin; Future  -     MRI angiogram head without contrast; Future          This is a 80-year-old female with a pituitary adenoma in the cavernous sinus on the left side  Some concern was raised that the images might be indicative of a on suspected meningioma because of a narrowing in the flow signal in the carotid  I do believe that this represents the hardening of the pituitary adenoma as is seen with the use of Cabergoline  Clearly that this is a hormonally reactive adenoma which suppressed its output with the use of Cabergoline  She has had a recent bump in her prolactin following cessation of her long-term Cabergolinel and switch to bromocriptine but she may simply need to have higher doses of bromocriptine to achieve the same result  In abundance of caution I have recommended repeating her MRI of her brain in 6 months time with attention to the pituitary and performance of an MRA at the same time  CHIEF COMPLAINT  Returning patient presents for consultation regarding pituitary adenoma vs meningioma   Referred back to our office by Dr Caroline Haider for pituitary adenoma  MRI showing some enlargement of mass  HISTORY OF PRESET ILLNESS  Patient presents with a brain mass  The patient reports that the mass has been present for 5 years  The onset of the mass was sudden  Associated symptoms were positive for he had gait disturbance to the point she was falling a lot and when she fell she had a hard time to get back up  She has tried Fioricet for her headaches and she was taking Cabergoline 7 days/week but recently was changed to Bromocriptine by Dr Nicole Velasquez  This is a patient who was admitted to the hospital 5 years ago with Parkinson's like features and who was found to have a mass in the cavernous sinus  At that time she had a prolactin level of 348  Multiple prolactin values are available which demonstrated the prolactin to be suppressed on Cabergoline  She has been feeling well  She switched endocrinologist and had repeat imaging studies performed  These repeat imaging studies were interpreted as having some carotid narrowing calling into question the original diagnosis of a adenoma  Some concern for this being a meningioma was made  Patient has had headaches and some memory loss with difficulty in recalling names  Concern for the development of Parkinson like syndrome on Cabergoline prompted a change to bromocriptine  Her prolactin level bumped slightly  Because of the interpretation of the MR high a CTA was ordered  The patient could not have the CTA because of multiple attempted sticks including up to 10 different IV attempts all of which were unsuccessful  She has had no significant change in her neurological examination  REVIEW OF SYSTEMS  Review of Systems   Constitutional: Positive for activity change (USING A CANE OCCASIONALLY FOR LOSS OF BALANCE)  HENT: Negative  Eyes: Negative  Respiratory: Negative  Cardiovascular: Negative  Gastrointestinal: Negative  Endocrine: Negative  Genitourinary: Negative      Musculoskeletal: Positive for gait problem  Skin: Negative  Neurological: Positive for weakness (GENERALIZED) and headaches  Negative for dizziness  MEMORY LOSS; PROBLEMS WITH NAMES   Hematological: Negative  Psychiatric/Behavioral: Negative for confusion  All other systems reviewed and are negative  The patient's ROS was reviewed by MD BROWN reviewed the ROS      Active Ambulatory Problems     Diagnosis Date Noted    Atrial fibrillation ([facility] [address] ) 02/13/2018    Benign essential hypertension 02/13/2018    Chronic anticoagulation 02/13/2018    Abnormal brain MRI 09/16/2015    Abnormal fasting glucose 05/16/2017    Anxiety 06/30/2014    Blepharoptosis 01/06/2015    Depression 02/11/2014    Elevated prolactin level ([facility] [address] ) 03/05/2015    Esophageal ring, acquired 05/29/2014    Fibrocystic breast disease 03/05/2015    Gastroesophageal reflux disease with esophagitis 10/06/2016    Severe frontal headaches 03/12/2015    Hypercholesterolemia 02/11/2014    Hyperprolactinemia ([facility] [address] ) 10/28/2015    Hypothyroidism 02/11/2014    Obesity 03/30/2015    Osteoporosis 10/28/2015    Pituitary adenoma ([facility] [address] ) 02/03/2015    Prolactinoma ([facility] [address] ) 08/09/2016    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    Vitamin D deficiency 02/11/2014    Vasovagal syncope 04/24/2018    Wellness examination 04/24/2018    Screen for colon cancer 04/24/2018    Screening for breast cancer 04/24/2018     Resolved Ambulatory Problems     Diagnosis Date Noted    Pituitary tumor 05/01/2018     Past Medical History:   Diagnosis Date    A-fib ([facility] [address] )     Abnormal brain MRI     Anemia     Ankle fracture     Anxiety     Atrial fibrillation (HCC)     Blepharoptosis     Brain mass     Cataract     Colon polyps     Depression     Disease of thyroid gland     Dysphagia     Elevated prolactin level (HCC)     Esophageal perforation     Fracture, shoulder     GERD (gastroesophageal reflux disease)     Headache     Hypercholesterolemia     Hyperlipidemia     Hyperprolactinemia (HCC)     Hypertension     Hypotension     Hypothyroidism     Left heart failure (HCC)     Loss of smell     Lumbago with sciatica     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HCC)     Migraine     Obesity     Osteoporosis     Pituitary adenoma (HCC)     Pituitary tumor     Prolactinoma ([address] )     Renal failure     Syncope     Third nerve palsy     Unstable balance     Vitamin D deficiency     Wrist fracture        Past Surgical History:   Procedure Laterality Date    APPENDECTOMY      CATARACT EXTRACTION      CORONARY ANGIOPLASTY WITH STENT PLACEMENT  01/2016    DILATION AND CURETTAGE OF UTERUS      ESOPHAGOGASTRODUODENOSCOPY  08/29/2012    diagnostic    FIXATION KYPHOPLASTY      HYSTERECTOMY      [place] ESOPHAGOGASTRODUODENOSCOPY TRANSORAL DIAGNOSTIC N/A 1/25/2018    Procedure: ESOPHAGOGASTRODUODENOSCOPY (EGD); Surgeon: Hakan Bailey MD;  Location: [facility];   Service: Gastroenterology    TONSILLECTOMY      TOTAL ABDOMINAL HYSTERECTOMY W/ BILATERAL SALPINGOOPHORECTOMY      VERTEBRAL AUGMENTATION      percutaneous vertebral augmentation kyphoplasty       History   Smoking Status    Never Smoker   Smokeless Tobacco    Never Used       History   Alcohol Use    Yes     Comment: social; drinks wine       History   Drug Use No       Vitals:    12/18/18 1044   BP: 159/89   BP Location: Right arm   Patient Position: Sitting   Cuff Size: Adult   Pulse: 68   Resp: 16   Temp: 97 9 °F (36 6 °C)   TempSrc: Tympanic   Weight: 77 8 kg (171 lb 8 oz)   Height: 5' 4 5" (1 638 m)         Current Outpatient Prescriptions:     amLODIPine (NORVASC) 5 mg tablet, TAKE 1 TABLET BY MOUTH DAILY IN THE MORNING, Disp: 90 tablet, Rfl: 3    bromocriptine (PARLODEL) 2 5 mg tablet, Take 1 tablet (2 5 mg total) by mouth 2 (two) times a day, Disp: 60 tablet, Rfl: 0    butalbital-acetaminophen-caffeine (FIORICET,ESGIC) -40 mg per tablet, Take 1 tablet by mouth 2 (two) times a day as needed for headaches (headache), Disp: 60 tablet, Rfl: 5    Cholecalciferol (VITAMIN D) 2000 units CAPS, Take 1 tablet by mouth daily, Disp: , Rfl:     levothyroxine 25 mcg tablet, TAKE 1 TABLET BY MOUTH EVERY DAY, Disp: 90 tablet, Rfl: 0    lisinopril (ZESTRIL) 5 mg tablet, Take 1 tablet (5 mg total) by mouth daily, Disp: 90 tablet, Rfl: 0    LORazepam (ATIVAN) 1 mg tablet, Take 1 tablet (1 mg total) by mouth 2 (two) times a day (Patient taking differently: Take 1 mg by mouth 2 (two) times a day as needed  ), Disp: 60 tablet, Rfl: 5    metoprolol tartrate (LOPRESSOR) 50 mg tablet, TAKE 1 TABLET BY MOUTH TWICE A DAY, Disp: 60 tablet, Rfl: 0    midodrine (PROAMATINE) 2 5 mg tablet, Take 1 tablet (2 5 mg total) by mouth 3 (three) times a day, Disp: 270 tablet, Rfl: 3    pantoprazole (PROTONIX) 40 mg tablet, TAKE 1 TABLET BY MOUTH 30 MINUTES BEFORE BREAKFAST DAILY, Disp: 90 tablet, Rfl: 3    simvastatin (ZOCOR) 20 mg tablet, TAKE 1 TABLET DAILY AT BEDTIME , Disp: 90 tablet, Rfl: 3    XARELTO 20 MG tablet, TAKE 1 TABLET BY MOUTH EVERY DAY, Disp: 30 tablet, Rfl: 5    The following portions of the patient's history were reviewed by MD and updated by MA as appropriate: allergies, current medications, past family history, past medical history, past social history, past surgical history and problem list       Physical Exam  Awake alert and oriented  Extraocular movements are intact  Pupils are equal round reactive to light and accommodate  Her facial sensation is intact  Her facial expression is intact in grimace and at rest  Her finger-to-nose testing is intact  She has no drift  She has no tremor  Specifically she has no pill rolling tremor  Her Romberg is negative  Her station and gait are normal  She has some difficulty with tandem gait but can perform this      RESULTS/DATA  An MRI of the brain is carefully reviewed and compared with previous studies  There is reduction in the carotid flow signal on the left side    There is no significant change in the size of the tumor  There is a deviation of the pituitary stalk to the right away from a mass which appears to be in the cavernous sinus on the left there is no contact with the overlying optic chiasm which is at least 4 mm away from the close approach of the mass

## 2018-12-18 NOTE — PROGRESS NOTES
Neurosurgery Office Note  Sarwat Mcgowan is a 68 y o  female    Type of Visit: Consult        Diagnoses and all orders for this visit:    Pituitary adenoma ([facility] [address] )  -     Ambulatory referral to Neurosurgery  -     MRI brain pituitary wo and w contrast; Future  -     Prolactin; Future  -     Prolactin; Future  -     MRI angiogram head without contrast; Future          This is a 67-year-old female with a pituitary adenoma in the cavernous sinus on the left side  Some concern was raised that the images might be indicative of a on suspected meningioma because of a narrowing in the flow signal in the carotid  I do believe that this represents the hardening of the pituitary adenoma as is seen with the use of Cabergoline  Clearly that this is a hormonally reactive adenoma which suppressed its output with the use of Cabergoline  She has had a recent bump in her prolactin following cessation of her long-term Cabergolinel and switch to bromocriptine but she may simply need to have higher doses of bromocriptine to achieve the same result  In abundance of caution I have recommended repeating her MRI of her brain in 6 months time with attention to the pituitary and performance of an MRA at the same time  CHIEF COMPLAINT  Returning patient presents for consultation regarding pituitary adenoma vs meningioma  Referred back to our office by Dr Lj Delcid for pituitary adenoma  MRI showing some enlargement of mass  HISTORY OF PRESET ILLNESS  Patient presents with a brain mass  The patient reports that the mass has been present for 5 years  The onset of the mass was sudden  Associated symptoms were positive for he had gait disturbance to the point she was falling a lot and when she fell she had a hard time to get back up  She has tried Fioricet for her headaches and she was taking Cabergoline 7 days/week but recently was changed to Bromocriptine by Dr Lj Delcid        This is a patient who was admitted to the hospital 5 years ago with Parkinson's like features and who was found to have a mass in the cavernous sinus  At that time she had a prolactin level of 348  Multiple prolactin values are available which demonstrated the prolactin to be suppressed on Cabergoline  She has been feeling well  She switched endocrinologist and had repeat imaging studies performed  These repeat imaging studies were interpreted as having some carotid narrowing calling into question the original diagnosis of a adenoma  Some concern for this being a meningioma was made  Patient has had headaches and some memory loss with difficulty in recalling names  Concern for the development of Parkinson like syndrome on Cabergoline prompted a change to bromocriptine  Her prolactin level bumped slightly  Because of the interpretation of the MR high a CTA was ordered  The patient could not have the CTA because of multiple attempted sticks including up to 10 different IV attempts all of which were unsuccessful  She has had no significant change in her neurological examination  REVIEW OF SYSTEMS  Review of Systems   Constitutional: Positive for activity change (USING A CANE OCCASIONALLY FOR LOSS OF BALANCE)  HENT: Negative  Eyes: Negative  Respiratory: Negative  Cardiovascular: Negative  Gastrointestinal: Negative  Endocrine: Negative  Genitourinary: Negative  Musculoskeletal: Positive for gait problem  Skin: Negative  Neurological: Positive for weakness (GENERALIZED) and headaches  Negative for dizziness  MEMORY LOSS; PROBLEMS WITH NAMES   Hematological: Negative  Psychiatric/Behavioral: Negative for confusion  All other systems reviewed and are negative  The patient's ROS was reviewed by MD   I reviewed the ROS      Active Ambulatory Problems     Diagnosis Date Noted    Atrial fibrillation ([facility] [address] ) 02/13/2018    Benign essential hypertension 02/13/2018    Chronic anticoagulation 02/13/2018    Abnormal brain MRI 09/16/2015    Abnormal fasting glucose 05/16/2017    Anxiety 06/30/2014    Blepharoptosis 01/06/2015    Depression 02/11/2014    Elevated prolactin level (HCC) 03/05/2015    Esophageal ring, acquired 05/29/2014    Fibrocystic breast disease 03/05/2015    Gastroesophageal reflux disease with esophagitis 10/06/2016    Severe frontal headaches 03/12/2015    Hypercholesterolemia 02/11/2014    Hyperprolactinemia ([address] ) 10/28/2015    Hypothyroidism 02/11/2014    Obesity 03/30/2015    Osteoporosis 10/28/2015    Pituitary adenoma ([address] ) 02/03/2015    Prolactinoma ([address] ) 08/09/2016    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    Vitamin D deficiency 02/11/2014    Vasovagal syncope 04/24/2018    Wellness examination 04/24/2018    Screen for colon cancer 04/24/2018    Screening for breast cancer 04/24/2018     Resolved Ambulatory Problems     Diagnosis Date Noted    Pituitary tumor 05/01/2018     Past Medical History:   Diagnosis Date    A-fib ([address] )     Abnormal brain MRI     Anemia     Ankle fracture     Anxiety     Atrial fibrillation (HCC)     Blepharoptosis     Brain mass     Cataract     Colon polyps     Depression     Disease of thyroid gland     Dysphagia     Elevated prolactin level (HCC)     Esophageal perforation     Fracture, shoulder     GERD (gastroesophageal reflux disease)     Headache     Hypercholesterolemia     Hyperlipidemia     Hyperprolactinemia (HCC)     Hypertension     Hypotension     Hypothyroidism     Left heart failure (HCC)     Loss of smell     Lumbago with sciatica     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[address] )     Migraine     Obesity     Osteoporosis     Pituitary adenoma ([address] )     Pituitary tumor     Prolactinoma ([address] )     Renal failure     Syncope     Third nerve palsy     Unstable balance     Vitamin D deficiency     Wrist fracture        Past Surgical History:   Procedure Laterality Date    APPENDECTOMY      CATARACT EXTRACTION      CORONARY ANGIOPLASTY WITH STENT PLACEMENT  01/2016    DILATION AND CURETTAGE OF UTERUS      ESOPHAGOGASTRODUODENOSCOPY  08/29/2012    diagnostic    FIXATION KYPHOPLASTY      HYSTERECTOMY      CT ESOPHAGOGASTRODUODENOSCOPY TRANSORAL DIAGNOSTIC N/A 1/25/2018    Procedure: ESOPHAGOGASTRODUODENOSCOPY (EGD); Surgeon: Munir Garrison MD;  Location: [facility];   Service: Gastroenterology    TONSILLECTOMY      TOTAL ABDOMINAL HYSTERECTOMY W/ BILATERAL SALPINGOOPHORECTOMY      VERTEBRAL AUGMENTATION      percutaneous vertebral augmentation kyphoplasty       History   Smoking Status    Never Smoker   Smokeless Tobacco    Never Used       History   Alcohol Use    Yes     Comment: social; drinks wine       History   Drug Use No       Vitals:    12/18/18 1044   BP: 159/89   BP Location: Right arm   Patient Position: Sitting   Cuff Size: Adult   Pulse: 68   Resp: 16   Temp: 97 9 °F (36 6 °C)   TempSrc: Tympanic   Weight: 77 8 kg (171 lb 8 oz)   Height: 5' 4 5" (1 638 m)         Current Outpatient Prescriptions:     amLODIPine (NORVASC) 5 mg tablet, TAKE 1 TABLET BY MOUTH DAILY IN THE MORNING, Disp: 90 tablet, Rfl: 3    bromocriptine (PARLODEL) 2 5 mg tablet, Take 1 tablet (2 5 mg total) by mouth 2 (two) times a day, Disp: 60 tablet, Rfl: 0    butalbital-acetaminophen-caffeine (FIORICET,ESGIC) -40 mg per tablet, Take 1 tablet by mouth 2 (two) times a day as needed for headaches (headache), Disp: 60 tablet, Rfl: 5    Cholecalciferol (VITAMIN D) 2000 units CAPS, Take 1 tablet by mouth daily, Disp: , Rfl:     levothyroxine 25 mcg tablet, TAKE 1 TABLET BY MOUTH EVERY DAY, Disp: 90 tablet, Rfl: 0    lisinopril (ZESTRIL) 5 mg tablet, Take 1 tablet (5 mg total) by mouth daily, Disp: 90 tablet, Rfl: 0    LORazepam (ATIVAN) 1 mg tablet, Take 1 tablet (1 mg total) by mouth 2 (two) times a day (Patient taking differently: Take 1 mg by mouth 2 (two) times a day as needed  ), Disp: 60 tablet, Rfl: 5    metoprolol tartrate (LOPRESSOR) 50 mg tablet, TAKE 1 TABLET BY MOUTH TWICE A DAY, Disp: 60 tablet, Rfl: 0    midodrine (PROAMATINE) 2 5 mg tablet, Take 1 tablet (2 5 mg total) by mouth 3 (three) times a day, Disp: 270 tablet, Rfl: 3    pantoprazole (PROTONIX) 40 mg tablet, TAKE 1 TABLET BY MOUTH 30 MINUTES BEFORE BREAKFAST DAILY, Disp: 90 tablet, Rfl: 3    simvastatin (ZOCOR) 20 mg tablet, TAKE 1 TABLET DAILY AT BEDTIME , Disp: 90 tablet, Rfl: 3    XARELTO 20 MG tablet, TAKE 1 TABLET BY MOUTH EVERY DAY, Disp: 30 tablet, Rfl: 5    The following portions of the patient's history were reviewed by MD and updated by MA as appropriate: allergies, current medications, past family history, past medical history, past social history, past surgical history and problem list       Physical Exam  Awake alert and oriented  Extraocular movements are intact  Pupils are equal round reactive to light and accommodate  Her facial sensation is intact  Her facial expression is intact in grimace and at rest  Her finger-to-nose testing is intact  She has no drift  She has no tremor  Specifically she has no pill rolling tremor  Her Romberg is negative  Her station and gait are normal  She has some difficulty with tandem gait but can perform this      RESULTS/DATA  An MRI of the brain is carefully reviewed and compared with previous studies  There is reduction in the carotid flow signal on the left side  There is no significant change in the size of the tumor  There is a deviation of the pituitary stalk to the right away from a mass which appears to be in the cavernous sinus on the left there is no contact with the overlying optic chiasm which is at least 4 mm away from the close approach of the mass

## 2018-12-20 ENCOUNTER — TELEPHONE (OUTPATIENT)
Dept: NEUROLOGY | Facility: CLINIC | Age: 73
End: 2018-12-20

## 2018-12-20 NOTE — TELEPHONE ENCOUNTER
Please call the patient and advised her to follow up with family physician regarding sinusitis on the CT scan

## 2018-12-20 NOTE — TELEPHONE ENCOUNTER
shirley from rad reading room called frederic at [place]  and states that there are significant findings on CT

## 2018-12-20 NOTE — TELEPHONE ENCOUNTER
And please make sure radiology is talking about the scat scan from 12/17 which they are reporting only a sinusitis otherwise unremarkable, please make sure that that is the CT scan they are talking about As I do not see any other report

## 2018-12-24 ENCOUNTER — HOSPITAL ENCOUNTER (OUTPATIENT)
Dept: CT IMAGING | Facility: HOSPITAL | Age: 73
Discharge: HOME | End: 2018-12-24
Attending: PSYCHIATRY & NEUROLOGY

## 2018-12-24 DIAGNOSIS — I65.22 STENOSIS OF LEFT CAROTID ARTERY: ICD-10-CM

## 2018-12-24 DIAGNOSIS — I10 ESSENTIAL HYPERTENSION: ICD-10-CM

## 2018-12-24 RX ORDER — METOPROLOL TARTRATE 50 MG/1
TABLET, FILM COATED ORAL
Qty: 60 TABLET | Refills: 0 | Status: SHIPPED | OUTPATIENT
Start: 2018-12-24 | End: 2019-01-23 | Stop reason: SDUPTHER

## 2018-12-26 NOTE — TELEPHONE ENCOUNTER
Confirmed that was what they were referring to  There is an addendum there, did you see that as well?

## 2019-01-14 ENCOUNTER — TELEPHONE (OUTPATIENT)
Dept: INTERNAL MEDICINE CLINIC | Facility: CLINIC | Age: 74
End: 2019-01-14

## 2019-01-22 ENCOUNTER — APPOINTMENT (OUTPATIENT)
Dept: LAB | Facility: HOSPITAL | Age: 74
End: 2019-01-22
Attending: NEUROLOGICAL SURGERY
Payer: MEDICARE

## 2019-01-22 DIAGNOSIS — D35.2 PITUITARY ADENOMA (HCC): ICD-10-CM

## 2019-01-22 LAB — PROLACTIN SERPL-MCNC: 43.1 NG/ML

## 2019-01-22 PROCEDURE — 36415 COLL VENOUS BLD VENIPUNCTURE: CPT

## 2019-01-22 PROCEDURE — 84146 ASSAY OF PROLACTIN: CPT

## 2019-01-23 DIAGNOSIS — I10 ESSENTIAL HYPERTENSION: ICD-10-CM

## 2019-01-23 RX ORDER — METOPROLOL TARTRATE 50 MG/1
TABLET, FILM COATED ORAL
Qty: 60 TABLET | Refills: 0 | Status: SHIPPED | OUTPATIENT
Start: 2019-01-23 | End: 2019-02-19 | Stop reason: SDUPTHER

## 2019-02-06 ENCOUNTER — TELEPHONE (OUTPATIENT)
Dept: INTERNAL MEDICINE CLINIC | Facility: CLINIC | Age: 74
End: 2019-02-06

## 2019-02-06 NOTE — TELEPHONE ENCOUNTER
Just an FYI for Doctor Penn    Patient was Admitted to [facility] today for a  left elbow fracture

## 2019-02-11 ENCOUNTER — TELEPHONE (OUTPATIENT)
Dept: INTERNAL MEDICINE CLINIC | Facility: CLINIC | Age: 74
End: 2019-02-11

## 2019-02-11 ENCOUNTER — TRANSITIONAL CARE MANAGEMENT (OUTPATIENT)
Dept: INTERNAL MEDICINE CLINIC | Facility: CLINIC | Age: 74
End: 2019-02-11

## 2019-02-11 NOTE — PROGRESS NOTES
1st attempt-LVM asking pt to return call for TCM documentation and TCM appointment        By Ninfa Kendrick

## 2019-02-19 DIAGNOSIS — I10 ESSENTIAL HYPERTENSION: ICD-10-CM

## 2019-02-19 RX ORDER — METOPROLOL TARTRATE 50 MG/1
50 TABLET, FILM COATED ORAL 2 TIMES DAILY
Qty: 180 TABLET | Refills: 0 | Status: SHIPPED | OUTPATIENT
Start: 2019-02-19 | End: 2019-05-13 | Stop reason: SDUPTHER

## 2019-02-25 DIAGNOSIS — E03.9 HYPOTHYROIDISM, UNSPECIFIED TYPE: ICD-10-CM

## 2019-02-25 RX ORDER — LEVOTHYROXINE SODIUM 0.03 MG/1
25 TABLET ORAL DAILY
Qty: 90 TABLET | Refills: 0 | Status: SHIPPED | OUTPATIENT
Start: 2019-02-25 | End: 2019-05-27 | Stop reason: SDUPTHER

## 2019-03-15 DIAGNOSIS — E78.5 HYPERLIPIDEMIA, UNSPECIFIED HYPERLIPIDEMIA TYPE: ICD-10-CM

## 2019-03-15 RX ORDER — SIMVASTATIN 20 MG
TABLET ORAL
Qty: 90 TABLET | Refills: 3 | Status: SHIPPED | OUTPATIENT
Start: 2019-03-15 | End: 2020-03-09

## 2019-03-19 ENCOUNTER — OFFICE VISIT (OUTPATIENT)
Dept: INTERNAL MEDICINE CLINIC | Facility: CLINIC | Age: 74
End: 2019-03-19
Payer: MEDICARE

## 2019-03-19 VITALS
BODY MASS INDEX: 26.33 KG/M2 | WEIGHT: 158 LBS | SYSTOLIC BLOOD PRESSURE: 118 MMHG | HEIGHT: 65 IN | OXYGEN SATURATION: 97 % | DIASTOLIC BLOOD PRESSURE: 62 MMHG | HEART RATE: 75 BPM

## 2019-03-19 DIAGNOSIS — I10 BENIGN ESSENTIAL HYPERTENSION: Primary | ICD-10-CM

## 2019-03-19 DIAGNOSIS — E22.1 HYPERPROLACTINEMIA (HCC): ICD-10-CM

## 2019-03-19 PROCEDURE — 99213 OFFICE O/P EST LOW 20 MIN: CPT | Performed by: INTERNAL MEDICINE

## 2019-03-23 DIAGNOSIS — I10 ESSENTIAL HYPERTENSION: ICD-10-CM

## 2019-03-25 RX ORDER — AMLODIPINE BESYLATE 5 MG/1
TABLET ORAL
Qty: 90 TABLET | Refills: 3 | Status: SHIPPED | OUTPATIENT
Start: 2019-03-25 | End: 2019-05-14 | Stop reason: ALTCHOICE

## 2019-04-08 DIAGNOSIS — I10 ESSENTIAL HYPERTENSION: ICD-10-CM

## 2019-04-08 RX ORDER — LISINOPRIL 5 MG/1
TABLET ORAL
Qty: 90 TABLET | Refills: 0 | Status: SHIPPED | OUTPATIENT
Start: 2019-04-08 | End: 2019-07-06 | Stop reason: SDUPTHER

## 2019-04-29 DIAGNOSIS — D35.2 PITUITARY ADENOMA (HCC): Primary | ICD-10-CM

## 2019-05-01 ENCOUNTER — TELEPHONE (OUTPATIENT)
Dept: NEUROLOGY | Facility: CLINIC | Age: 74
End: 2019-05-01

## 2019-05-01 DIAGNOSIS — D35.2 PITUITARY ADENOMA (HCC): ICD-10-CM

## 2019-05-01 RX ORDER — BROMOCRIPTINE MESYLATE 2.5 MG/1
2.5 TABLET ORAL 2 TIMES DAILY
Qty: 60 TABLET | Refills: 6 | Status: SHIPPED | OUTPATIENT
Start: 2019-05-01 | End: 2019-07-23 | Stop reason: SDUPTHER

## 2019-05-07 ENCOUNTER — APPOINTMENT (OUTPATIENT)
Dept: LAB | Facility: CLINIC | Age: 74
End: 2019-05-07
Payer: MEDICARE

## 2019-05-07 DIAGNOSIS — E03.9 ACQUIRED HYPOTHYROIDISM: ICD-10-CM

## 2019-05-07 DIAGNOSIS — E78.00 HYPERCHOLESTEROLEMIA: ICD-10-CM

## 2019-05-07 DIAGNOSIS — I10 BENIGN ESSENTIAL HYPERTENSION: ICD-10-CM

## 2019-05-07 LAB
ALBUMIN SERPL BCP-MCNC: 3.6 G/DL (ref 3.5–5)
ALP SERPL-CCNC: 137 U/L (ref 46–116)
ALT SERPL W P-5'-P-CCNC: 11 U/L (ref 12–78)
ANION GAP SERPL CALCULATED.3IONS-SCNC: 11 MMOL/L (ref 4–13)
AST SERPL W P-5'-P-CCNC: 18 U/L (ref 5–45)
BILIRUB SERPL-MCNC: 0.38 MG/DL (ref 0.2–1)
BUN SERPL-MCNC: 10 MG/DL (ref 5–25)
CALCIUM SERPL-MCNC: 8.9 MG/DL (ref 8.3–10.1)
CHLORIDE SERPL-SCNC: 106 MMOL/L (ref 100–108)
CHOLEST SERPL-MCNC: 218 MG/DL (ref 50–200)
CK SERPL-CCNC: 33 U/L (ref 26–192)
CO2 SERPL-SCNC: 22 MMOL/L (ref 21–32)
CREAT SERPL-MCNC: 0.84 MG/DL (ref 0.6–1.3)
GFR SERPL CREATININE-BSD FRML MDRD: 70 ML/MIN/1.73SQ M
GLUCOSE P FAST SERPL-MCNC: 92 MG/DL (ref 65–99)
HDLC SERPL-MCNC: 94 MG/DL (ref 40–60)
LDLC SERPL CALC-MCNC: 104 MG/DL (ref 0–100)
POTASSIUM SERPL-SCNC: 3.6 MMOL/L (ref 3.5–5.3)
PROT SERPL-MCNC: 7.3 G/DL (ref 6.4–8.2)
SODIUM SERPL-SCNC: 139 MMOL/L (ref 136–145)
TRIGL SERPL-MCNC: 98 MG/DL
TSH SERPL DL<=0.05 MIU/L-ACNC: 1.43 UIU/ML (ref 0.36–3.74)

## 2019-05-07 PROCEDURE — 80061 LIPID PANEL: CPT

## 2019-05-07 PROCEDURE — 84443 ASSAY THYROID STIM HORMONE: CPT

## 2019-05-07 PROCEDURE — 36415 COLL VENOUS BLD VENIPUNCTURE: CPT

## 2019-05-07 PROCEDURE — 80053 COMPREHEN METABOLIC PANEL: CPT

## 2019-05-07 PROCEDURE — 82550 ASSAY OF CK (CPK): CPT

## 2019-05-11 DIAGNOSIS — I48.91 ATRIAL FIBRILLATION, UNSPECIFIED TYPE (HCC): ICD-10-CM

## 2019-05-13 DIAGNOSIS — I10 ESSENTIAL HYPERTENSION: ICD-10-CM

## 2019-05-13 RX ORDER — RIVAROXABAN 20 MG/1
TABLET, FILM COATED ORAL
Qty: 30 TABLET | Refills: 5 | Status: SHIPPED | OUTPATIENT
Start: 2019-05-13 | End: 2019-11-04 | Stop reason: SDUPTHER

## 2019-05-13 RX ORDER — METOPROLOL TARTRATE 50 MG/1
TABLET, FILM COATED ORAL
Qty: 180 TABLET | Refills: 0 | Status: SHIPPED | OUTPATIENT
Start: 2019-05-13 | End: 2019-05-14 | Stop reason: SDUPTHER

## 2019-05-14 ENCOUNTER — OFFICE VISIT (OUTPATIENT)
Dept: INTERNAL MEDICINE CLINIC | Facility: CLINIC | Age: 74
End: 2019-05-14
Payer: MEDICARE

## 2019-05-14 ENCOUNTER — OFFICE VISIT (OUTPATIENT)
Dept: CARDIOLOGY CLINIC | Facility: CLINIC | Age: 74
End: 2019-05-14
Payer: MEDICARE

## 2019-05-14 VITALS
HEIGHT: 65 IN | WEIGHT: 164 LBS | BODY MASS INDEX: 27.32 KG/M2 | DIASTOLIC BLOOD PRESSURE: 80 MMHG | SYSTOLIC BLOOD PRESSURE: 140 MMHG | HEART RATE: 60 BPM | OXYGEN SATURATION: 97 %

## 2019-05-14 VITALS
HEART RATE: 103 BPM | WEIGHT: 164.8 LBS | DIASTOLIC BLOOD PRESSURE: 84 MMHG | BODY MASS INDEX: 27.85 KG/M2 | OXYGEN SATURATION: 98 % | SYSTOLIC BLOOD PRESSURE: 126 MMHG

## 2019-05-14 DIAGNOSIS — R51.9 SEVERE FRONTAL HEADACHES: ICD-10-CM

## 2019-05-14 DIAGNOSIS — I48.20 CHRONIC ATRIAL FIBRILLATION (HCC): Primary | ICD-10-CM

## 2019-05-14 DIAGNOSIS — Z11.59 NEED FOR HEPATITIS C SCREENING TEST: ICD-10-CM

## 2019-05-14 DIAGNOSIS — F41.9 ANXIETY: ICD-10-CM

## 2019-05-14 DIAGNOSIS — R55 VASOVAGAL SYNCOPE: ICD-10-CM

## 2019-05-14 DIAGNOSIS — I10 BENIGN ESSENTIAL HYPERTENSION: ICD-10-CM

## 2019-05-14 DIAGNOSIS — Z79.01 CHRONIC ANTICOAGULATION: ICD-10-CM

## 2019-05-14 DIAGNOSIS — D35.2 PITUITARY ADENOMA (HCC): Chronic | ICD-10-CM

## 2019-05-14 DIAGNOSIS — Z00.00 MEDICARE ANNUAL WELLNESS VISIT, SUBSEQUENT: Primary | ICD-10-CM

## 2019-05-14 DIAGNOSIS — I48.20 CHRONIC ATRIAL FIBRILLATION (HCC): Chronic | ICD-10-CM

## 2019-05-14 DIAGNOSIS — E03.9 ACQUIRED HYPOTHYROIDISM: Chronic | ICD-10-CM

## 2019-05-14 DIAGNOSIS — E78.00 HYPERCHOLESTEROLEMIA: Chronic | ICD-10-CM

## 2019-05-14 DIAGNOSIS — I10 ESSENTIAL HYPERTENSION: ICD-10-CM

## 2019-05-14 DIAGNOSIS — I10 BENIGN ESSENTIAL HYPERTENSION: Chronic | ICD-10-CM

## 2019-05-14 PROBLEM — Z12.39 SCREENING FOR BREAST CANCER: Status: RESOLVED | Noted: 2018-04-24 | Resolved: 2019-05-14

## 2019-05-14 PROBLEM — I48.91 ATRIAL FIBRILLATION (HCC): Chronic | Status: ACTIVE | Noted: 2018-02-13

## 2019-05-14 PROBLEM — R73.01 ABNORMAL FASTING GLUCOSE: Status: RESOLVED | Noted: 2017-05-16 | Resolved: 2019-05-14

## 2019-05-14 PROBLEM — Z12.11 SCREEN FOR COLON CANCER: Status: RESOLVED | Noted: 2018-04-24 | Resolved: 2019-05-14

## 2019-05-14 PROCEDURE — 99214 OFFICE O/P EST MOD 30 MIN: CPT | Performed by: INTERNAL MEDICINE

## 2019-05-14 PROCEDURE — G0438 PPPS, INITIAL VISIT: HCPCS | Performed by: INTERNAL MEDICINE

## 2019-05-14 RX ORDER — BUTALBITAL, ACETAMINOPHEN AND CAFFEINE 50; 325; 40 MG/1; MG/1; MG/1
1 TABLET ORAL 2 TIMES DAILY PRN
Qty: 60 TABLET | Refills: 5 | Status: SHIPPED | OUTPATIENT
Start: 2019-05-14 | End: 2019-07-23 | Stop reason: SDUPTHER

## 2019-05-14 RX ORDER — LORAZEPAM 1 MG/1
1 TABLET ORAL 2 TIMES DAILY PRN
Qty: 60 TABLET | Refills: 5 | Status: SHIPPED | OUTPATIENT
Start: 2019-05-14 | End: 2019-12-03 | Stop reason: SDUPTHER

## 2019-05-14 RX ORDER — METOPROLOL TARTRATE 50 MG/1
50 TABLET, FILM COATED ORAL 2 TIMES DAILY
Start: 2019-05-14 | End: 2020-04-23

## 2019-05-14 RX ORDER — AMOXICILLIN 500 MG/1
500 TABLET, FILM COATED ORAL
COMMUNITY

## 2019-05-14 RX ORDER — LORAZEPAM 1 MG/1
1 TABLET ORAL 2 TIMES DAILY PRN
Start: 2019-05-14 | End: 2019-05-14 | Stop reason: SDUPTHER

## 2019-05-27 DIAGNOSIS — E03.9 HYPOTHYROIDISM, UNSPECIFIED TYPE: ICD-10-CM

## 2019-05-28 RX ORDER — LEVOTHYROXINE SODIUM 0.03 MG/1
TABLET ORAL
Qty: 90 TABLET | Refills: 0 | Status: SHIPPED | OUTPATIENT
Start: 2019-05-28 | End: 2019-08-26 | Stop reason: SDUPTHER

## 2019-06-11 ENCOUNTER — APPOINTMENT (OUTPATIENT)
Dept: LAB | Facility: HOSPITAL | Age: 74
End: 2019-06-11
Attending: NEUROLOGICAL SURGERY
Payer: MEDICARE

## 2019-06-11 DIAGNOSIS — D35.2 PITUITARY ADENOMA (HCC): ICD-10-CM

## 2019-06-11 LAB — PROLACTIN SERPL-MCNC: 47.1 NG/ML

## 2019-06-11 PROCEDURE — 84146 ASSAY OF PROLACTIN: CPT

## 2019-06-11 PROCEDURE — 36415 COLL VENOUS BLD VENIPUNCTURE: CPT

## 2019-06-17 ENCOUNTER — HOSPITAL ENCOUNTER (OUTPATIENT)
Dept: MRI IMAGING | Facility: CLINIC | Age: 74
Discharge: HOME | End: 2019-06-17
Payer: MEDICARE

## 2019-06-17 DIAGNOSIS — D35.2 PITUITARY ADENOMA (HCC): ICD-10-CM

## 2019-06-17 PROCEDURE — 70553 MRI BRAIN STEM W/O & W/DYE: CPT

## 2019-06-17 PROCEDURE — A9585 GADOBUTROL INJECTION: HCPCS | Performed by: NEUROLOGICAL SURGERY

## 2019-06-17 PROCEDURE — 70544 MR ANGIOGRAPHY HEAD W/O DYE: CPT

## 2019-06-17 RX ADMIN — GADOBUTROL 7 ML: 604.72 INJECTION INTRAVENOUS at 14:50

## 2019-06-20 ENCOUNTER — OFFICE VISIT (OUTPATIENT)
Dept: NEUROSURGERY | Facility: CLINIC | Age: 74
End: 2019-06-20
Payer: MEDICARE

## 2019-06-20 VITALS
HEIGHT: 65 IN | WEIGHT: 159 LBS | RESPIRATION RATE: 16 BRPM | BODY MASS INDEX: 26.49 KG/M2 | SYSTOLIC BLOOD PRESSURE: 140 MMHG | TEMPERATURE: 97.7 F | HEART RATE: 63 BPM | DIASTOLIC BLOOD PRESSURE: 102 MMHG

## 2019-06-20 DIAGNOSIS — D35.2 PITUITARY ADENOMA (HCC): Primary | ICD-10-CM

## 2019-06-20 PROCEDURE — 99213 OFFICE O/P EST LOW 20 MIN: CPT | Performed by: NEUROLOGICAL SURGERY

## 2019-07-06 DIAGNOSIS — I10 ESSENTIAL HYPERTENSION: ICD-10-CM

## 2019-07-08 RX ORDER — LISINOPRIL 5 MG/1
TABLET ORAL
Qty: 90 TABLET | Refills: 0 | Status: SHIPPED | OUTPATIENT
Start: 2019-07-08 | End: 2019-09-17 | Stop reason: CLARIF

## 2019-07-23 ENCOUNTER — OFFICE VISIT (OUTPATIENT)
Dept: NEUROLOGY | Facility: CLINIC | Age: 74
End: 2019-07-23
Payer: MEDICARE

## 2019-07-23 VITALS
HEART RATE: 74 BPM | DIASTOLIC BLOOD PRESSURE: 80 MMHG | WEIGHT: 158 LBS | SYSTOLIC BLOOD PRESSURE: 140 MMHG | BODY MASS INDEX: 26.33 KG/M2 | HEIGHT: 65 IN

## 2019-07-23 DIAGNOSIS — G44.229 CHRONIC TENSION-TYPE HEADACHE, NOT INTRACTABLE: Primary | ICD-10-CM

## 2019-07-23 DIAGNOSIS — D35.2 PITUITARY ADENOMA (HCC): ICD-10-CM

## 2019-07-23 PROCEDURE — 99214 OFFICE O/P EST MOD 30 MIN: CPT | Performed by: PSYCHIATRY & NEUROLOGY

## 2019-07-23 RX ORDER — BUTALBITAL, ACETAMINOPHEN AND CAFFEINE 50; 325; 40 MG/1; MG/1; MG/1
1 TABLET ORAL 2 TIMES DAILY PRN
Qty: 60 TABLET | Refills: 5 | Status: SHIPPED | OUTPATIENT
Start: 2019-07-23 | End: 2020-01-31 | Stop reason: SDUPTHER

## 2019-07-23 RX ORDER — BROMOCRIPTINE MESYLATE 2.5 MG/1
TABLET ORAL
Qty: 90 TABLET | Refills: 6 | Status: SHIPPED | OUTPATIENT
Start: 2019-07-23 | End: 2019-08-22 | Stop reason: SDUPTHER

## 2019-07-23 NOTE — PROGRESS NOTES
Progress Note - Neurology   Manas Horne 68 y o  female MRN: [id number]  Unit/Bed#:  Encounter: [id number]      Subjective:   Patient is here for a follow-up visit and was last seen by us in December of last year, with a history of headaches, pituitary adenoma, and during her last visit patient was restarted on bromocriptine 2 5 mg twice a day since her prolactin level had elevated  Patient was also followed up with Neurosurgery last month, and was felt to be stable except for a mildly elevated prolactin level  Her most recent MRI also showed interval stability  Unfortunately the patient suffered a fall in Jan and had a  fracture of the left arm, and needed to undergo significant surgery and left elbow replacement  She has recovered with the help of rehabilitation and has regained significant mobility in the left upper extremity  She does complain of pain in the left elbow as well as headaches with changes in the barometric pressure generally responding to Fioricet which she uses on a p r n  Basis  ROS:   Review of Systems   Constitutional: Negative  Negative for appetite change and fever  HENT: Negative  Negative for hearing loss, tinnitus, trouble swallowing and voice change  Eyes: Negative  Negative for photophobia and pain  Respiratory: Negative  Negative for shortness of breath  Cardiovascular: Negative  Negative for palpitations  Gastrointestinal: Negative  Negative for nausea and vomiting  Endocrine: Negative  Negative for cold intolerance and heat intolerance  Genitourinary: Negative  Negative for dysuria, frequency and urgency  Musculoskeletal: Negative  Negative for myalgias and neck pain  Skin: Negative  Negative for rash  Neurological: Positive for headaches  Negative for dizziness, tremors, seizures, syncope, facial asymmetry, speech difficulty, weakness and light-headedness  Hematological: Negative  Does not bruise/bleed easily     Psychiatric/Behavioral: Negative  Negative for confusion, hallucinations and sleep disturbance  Vitals:   Vitals:    07/23/19 1430   BP: 140/80   BP Location: Left arm   Patient Position: Sitting   Cuff Size: Adult   Pulse: 74   Weight: 71 7 kg (158 lb)   Height: 5' 4 5" (1 638 m)   ,Body mass index is 26 7 kg/m²      MEDS:      Current Outpatient Medications:     amoxicillin (AMOXIL) 500 MG tablet, Take 500 mg by mouth 4 tablets 1 hour before dental procedure, Disp: , Rfl:     bromocriptine (PARLODEL) 2 5 mg tablet, Take 1 tablet (2 5 mg total) by mouth 2 (two) times a day, Disp: 60 tablet, Rfl: 6    butalbital-acetaminophen-caffeine (FIORICET,ESGIC) -40 mg per tablet, Take 1 tablet by mouth 2 (two) times a day as needed for headaches (headache), Disp: 60 tablet, Rfl: 5    Cholecalciferol (VITAMIN D) 2000 units CAPS, Take 1 tablet by mouth daily, Disp: , Rfl:     levothyroxine 25 mcg tablet, TAKE 1 TABLET BY MOUTH EVERY DAY, Disp: 90 tablet, Rfl: 0    lisinopril (ZESTRIL) 5 mg tablet, TAKE 1 TABLET BY MOUTH EVERY DAY, Disp: 90 tablet, Rfl: 0    LORazepam (ATIVAN) 1 mg tablet, Take 1 tablet (1 mg total) by mouth 2 (two) times a day as needed for anxiety, Disp: 60 tablet, Rfl: 5    metoprolol tartrate (LOPRESSOR) 50 mg tablet, Take 1 tablet (50 mg total) by mouth 2 (two) times a day, Disp: , Rfl:     midodrine (PROAMATINE) 2 5 mg tablet, Take 1 tablet (2 5 mg total) by mouth 3 (three) times a day, Disp: 270 tablet, Rfl: 3    pantoprazole (PROTONIX) 40 mg tablet, TAKE 1 TABLET BY MOUTH 30 MINUTES BEFORE BREAKFAST DAILY, Disp: 90 tablet, Rfl: 3    simvastatin (ZOCOR) 20 mg tablet, TAKE 1 TABLET DAILY AT BEDTIME , Disp: 90 tablet, Rfl: 3    XARELTO 20 MG tablet, TAKE 1 TABLET BY MOUTH EVERY DAY, Disp: 30 tablet, Rfl: 5  :    Physical Exam:  General appearance: alert, appears stated age and cooperative  Head: Normocephalic, without obvious abnormality, atraumatic    On examination there is no evidence of any field cut, no evidence of any other cranial nerve deficit, limited strength noted in the left upper extremity proximal and distally, no sensory loss was noted in the upper lower extremities, deep tendon reflexes are hypoactive, and her gait is normal based with no evidence of any dysmetria  Lab Results: I have personally reviewed pertinent reports  Imaging Studies: I have personally reviewed pertinent reports  Assessment:  1  Pituitary adenoma with chronic headaches  2  Hyperprolactinemia  Plan:  Patient is advised to increase the dose of bromocriptine 2 5 mg at bedtime, and will continue with 2 5 in the morning, repeat prolactin level in 1 month, continue Fioricet on a p r n  Basis and will return back to see me in 6 months  Based on her prolactin level further dosage adjustment will be recommended  7/23/2019,2:41 PM    Dictation voice to text software has been used in the creation of this document  Please consider this in light of any contextual or grammatical errors

## 2019-08-10 DIAGNOSIS — I10 ESSENTIAL HYPERTENSION: ICD-10-CM

## 2019-08-12 RX ORDER — METOPROLOL TARTRATE 50 MG/1
TABLET, FILM COATED ORAL
Qty: 180 TABLET | Refills: 0 | Status: SHIPPED | OUTPATIENT
Start: 2019-08-12 | End: 2019-08-14 | Stop reason: SDUPTHER

## 2019-08-14 DIAGNOSIS — I95.1 ORTHOSTATIC HYPOTENSION: ICD-10-CM

## 2019-08-14 DIAGNOSIS — I10 ESSENTIAL HYPERTENSION: ICD-10-CM

## 2019-08-14 RX ORDER — METOPROLOL TARTRATE 50 MG/1
TABLET, FILM COATED ORAL
Qty: 180 TABLET | Refills: 0 | Status: SHIPPED | OUTPATIENT
Start: 2019-08-14 | End: 2019-09-17 | Stop reason: CLARIF

## 2019-08-14 RX ORDER — MIDODRINE HYDROCHLORIDE 2.5 MG/1
2.5 TABLET ORAL 3 TIMES DAILY
Qty: 270 TABLET | Refills: 3 | Status: SHIPPED | OUTPATIENT
Start: 2019-08-14 | End: 2020-07-28

## 2019-08-22 ENCOUNTER — APPOINTMENT (OUTPATIENT)
Dept: LAB | Facility: HOSPITAL | Age: 74
End: 2019-08-22
Attending: PSYCHIATRY & NEUROLOGY
Payer: MEDICARE

## 2019-08-22 DIAGNOSIS — D35.2 PITUITARY ADENOMA (HCC): ICD-10-CM

## 2019-08-22 LAB — PROLACTIN SERPL-MCNC: 64.5 NG/ML

## 2019-08-22 PROCEDURE — 84146 ASSAY OF PROLACTIN: CPT

## 2019-08-22 PROCEDURE — 36415 COLL VENOUS BLD VENIPUNCTURE: CPT

## 2019-08-22 RX ORDER — BROMOCRIPTINE MESYLATE 2.5 MG/1
5 TABLET ORAL 2 TIMES DAILY
Qty: 120 TABLET | Refills: 6 | Status: SHIPPED | OUTPATIENT
Start: 2019-08-22 | End: 2019-12-18 | Stop reason: SDUPTHER

## 2019-08-26 DIAGNOSIS — E03.9 HYPOTHYROIDISM, UNSPECIFIED TYPE: ICD-10-CM

## 2019-08-26 RX ORDER — LEVOTHYROXINE SODIUM 0.03 MG/1
TABLET ORAL
Qty: 90 TABLET | Refills: 0 | Status: SHIPPED | OUTPATIENT
Start: 2019-08-26 | End: 2019-09-17 | Stop reason: SDUPTHER

## 2019-09-10 ENCOUNTER — APPOINTMENT (OUTPATIENT)
Dept: LAB | Facility: HOSPITAL | Age: 74
End: 2019-09-10
Attending: INTERNAL MEDICINE
Payer: MEDICARE

## 2019-09-10 DIAGNOSIS — Z11.59 NEED FOR HEPATITIS C SCREENING TEST: ICD-10-CM

## 2019-09-10 DIAGNOSIS — D35.2 PITUITARY ADENOMA (HCC): ICD-10-CM

## 2019-09-10 DIAGNOSIS — I10 BENIGN ESSENTIAL HYPERTENSION: Chronic | ICD-10-CM

## 2019-09-10 DIAGNOSIS — E03.9 ACQUIRED HYPOTHYROIDISM: Chronic | ICD-10-CM

## 2019-09-10 LAB
ANION GAP SERPL CALCULATED.3IONS-SCNC: 8 MMOL/L (ref 4–13)
BUN SERPL-MCNC: 23 MG/DL (ref 5–25)
CALCIUM SERPL-MCNC: 9.5 MG/DL (ref 8.3–10.1)
CHLORIDE SERPL-SCNC: 99 MMOL/L (ref 100–108)
CHOLEST SERPL-MCNC: 257 MG/DL (ref 50–200)
CO2 SERPL-SCNC: 27 MMOL/L (ref 21–32)
CREAT SERPL-MCNC: 1.18 MG/DL (ref 0.6–1.3)
GFR SERPL CREATININE-BSD FRML MDRD: 46 ML/MIN/1.73SQ M
GLUCOSE P FAST SERPL-MCNC: 136 MG/DL (ref 65–99)
HCV AB SER QL: NORMAL
HDLC SERPL-MCNC: 138 MG/DL (ref 40–60)
LDLC SERPL CALC-MCNC: 103 MG/DL (ref 0–100)
NONHDLC SERPL-MCNC: 119 MG/DL
POTASSIUM SERPL-SCNC: 4.4 MMOL/L (ref 3.5–5.3)
PROLACTIN SERPL-MCNC: 23.6 NG/ML
SODIUM SERPL-SCNC: 134 MMOL/L (ref 136–145)
TRIGL SERPL-MCNC: 81 MG/DL
TSH SERPL DL<=0.05 MIU/L-ACNC: 4.8 UIU/ML (ref 0.36–3.74)

## 2019-09-10 PROCEDURE — 84146 ASSAY OF PROLACTIN: CPT

## 2019-09-10 PROCEDURE — 84443 ASSAY THYROID STIM HORMONE: CPT

## 2019-09-10 PROCEDURE — 80048 BASIC METABOLIC PNL TOTAL CA: CPT

## 2019-09-10 PROCEDURE — 80061 LIPID PANEL: CPT

## 2019-09-10 PROCEDURE — 36415 COLL VENOUS BLD VENIPUNCTURE: CPT

## 2019-09-10 PROCEDURE — 86803 HEPATITIS C AB TEST: CPT

## 2019-09-17 ENCOUNTER — OFFICE VISIT (OUTPATIENT)
Dept: INTERNAL MEDICINE CLINIC | Facility: CLINIC | Age: 74
End: 2019-09-17
Payer: MEDICARE

## 2019-09-17 VITALS
BODY MASS INDEX: 26.94 KG/M2 | DIASTOLIC BLOOD PRESSURE: 70 MMHG | WEIGHT: 159.4 LBS | SYSTOLIC BLOOD PRESSURE: 110 MMHG | OXYGEN SATURATION: 97 % | HEART RATE: 53 BPM

## 2019-09-17 DIAGNOSIS — E78.00 HYPERCHOLESTEROLEMIA: Chronic | ICD-10-CM

## 2019-09-17 DIAGNOSIS — I10 BENIGN ESSENTIAL HYPERTENSION: Chronic | ICD-10-CM

## 2019-09-17 DIAGNOSIS — E03.9 ACQUIRED HYPOTHYROIDISM: ICD-10-CM

## 2019-09-17 DIAGNOSIS — I48.20 CHRONIC ATRIAL FIBRILLATION (HCC): Chronic | ICD-10-CM

## 2019-09-17 DIAGNOSIS — R73.01 IMPAIRED FASTING GLUCOSE: ICD-10-CM

## 2019-09-17 DIAGNOSIS — Z23 NEED FOR INFLUENZA VACCINATION: ICD-10-CM

## 2019-09-17 DIAGNOSIS — D35.2 PITUITARY ADENOMA (HCC): Primary | Chronic | ICD-10-CM

## 2019-09-17 PROCEDURE — G0008 ADMIN INFLUENZA VIRUS VAC: HCPCS | Performed by: INTERNAL MEDICINE

## 2019-09-17 PROCEDURE — 90662 IIV NO PRSV INCREASED AG IM: CPT | Performed by: INTERNAL MEDICINE

## 2019-09-17 PROCEDURE — 99214 OFFICE O/P EST MOD 30 MIN: CPT | Performed by: INTERNAL MEDICINE

## 2019-09-17 RX ORDER — LEVOTHYROXINE SODIUM 0.05 MG/1
50 TABLET ORAL DAILY
Qty: 90 TABLET | Refills: 3 | Status: SHIPPED | OUTPATIENT
Start: 2019-09-17 | End: 2020-09-09

## 2019-09-17 NOTE — PATIENT INSTRUCTIONS
Fall Prevention   AMBULATORY CARE:   Fall prevention  includes ways to make your home and other areas safer  It also includes ways you can move more carefully to prevent a fall  Health conditions that cause changes in your blood pressure, vision, or muscle strength and coordination may increase your risk for falls  Medicines may also increase your risk for falls if they make you dizzy, weak, or sleepy  Call 911 or have someone else call if:   · You have fallen and are unconscious  · You have fallen and cannot move part of your body  Contact your healthcare provider if:   · You have fallen and have pain or a headache  · You have questions or concerns about your condition or care  Fall prevention tips:   · Stand or sit up slowly  This may help you keep your balance and prevent falls  · Use assistive devices as directed  Your healthcare provider may suggest that you use a cane or walker to help you keep your balance  You may need to have grab bars put in your bathroom near the toilet or in the shower  · Wear shoes that fit well and have soles that   Wear shoes both inside and outside  Use slippers with good   Do not wear shoes with high heels  · Wear a personal alarm  This is a device that allows you to call 911 if you fall and need help  Ask your healthcare provider for more information  · Stay active  Exercise can help strengthen your muscles and improve your balance  Your healthcare provider may recommend water aerobics or walking  He or she may also recommend physical therapy to improve your coordination  Never start an exercise program without talking to your healthcare provider first      · Manage your medical conditions  Keep all appointments with your healthcare providers  Visit your eye doctor as directed  Home safety tips:   · Add items to prevent falls in the bathroom  Put nonslip strips on your bath or shower floor to prevent you from slipping   Use a bath mat if you do not have carpet in the bathroom  This will prevent you from falling when you step out of the bath or shower  Use a shower seat so you do not need to stand while you shower  Sit on the toilet or a chair in your bathroom to dry yourself and put on clothing  This will prevent you from losing your balance from drying or dressing yourself while you are standing  · Keep paths clear  Remove books, shoes, and other objects from walkways and stairs  Place cords for telephones and lamps out of the way so that you do not need to walk over them  Tape them down if you cannot move them  Remove small rugs  If you cannot remove a rug, secure it with double-sided tape  This will prevent you from tripping  · Install bright lights in your home  Use night lights to help light paths to the bathroom or kitchen  Always turn on the light before you start walking  · Keep items you use often on shelves within reach  Do not use a step stool to help you reach an item  · Paint or place reflective tape on the edges of your stairs  This will help you see the stairs better  Follow up with your healthcare provider as directed:  Write down your questions so you remember to ask them during your visits  © 2017 [facility] Information is for End User's use only and may not be sold, redistributed or otherwise used for commercial purposes  All illustrations and images included in CareNotes® are the copyrighted property of [employer]  or Evelio Jaimes  The above information is an  only  It is not intended as medical advice for individual conditions or treatments  Talk to your doctor, nurse or pharmacist before following any medical regimen to see if it is safe and effective for you

## 2019-09-17 NOTE — PROGRESS NOTES
INTERNAL MEDICINE FOLLOW-UP OFFICE VISIT  [facility] - [facility]    NAME: Bianca Novak  AGE: 69 y o  SEX: female  : 1945     DATE: 2019     Assessment and Plan:     1  Pituitary adenoma [facility])  Assessment & Plan:  MRI is stable  Bromocriptine was increased back in 2019  Prolactin levels responded appropriately  Will monitor  2  Acquired hypothyroidism  Assessment & Plan:  TSH has risen slightly  Will increase levothyroxine to 50mcg  Orders:  -     levothyroxine 50 mcg tablet; Take 1 tablet (50 mcg total) by mouth daily  -     TSH, 3rd generation; Future; Expected date: 10/17/2019    3  Benign essential hypertension  Assessment & Plan:  Blood pressure has been running on the lower side  Stop lisinopril  Continue midodrine  Continue metoprolol  Orders:  -     Basic metabolic panel; Future; Expected date: 10/17/2019    4  Chronic atrial fibrillation (HCC)  Assessment & Plan:  Stable  Continue metoprolol and Xarelto  5  Hypercholesterolemia  Assessment & Plan:  Cholesterol is acceptable  Continue simvastatin  6  Need for influenza vaccination  -     influenza vaccine, [id number], high-dose, PF 0 5 mL (FLUZONE HIGH-DOSE)    7  Impaired fasting glucose  Comments:  Previously blood sugars have been controlled  Will check glucose and A1C in 4-6 weeks  Orders:  -     Hemoglobin A1C; Future; Expected date: 10/17/2019    She declines CRC screening, breast cancer screening, DEXA scan    Return in about 5 months (around 2020) for Follow-up  Chief Complaint:     Chief Complaint   Patient presents with    Follow-up     4 month and labs- 09/10/2019      History of Present Illness:     Patient presents for routine follow-up  Pituitary adenoma:  She has a history of pituitary adenoma/prolactinoma in the cavernous sinus on the left side  She follows regularly with Neurosurgery and Neurology    Her most recent prolactin level was normal her most recent MRI in June 2019 was stable from previous  She is currently prescribed bromocriptine  Her dose was increased back in July 2019 by Neurology  Hypothyroidism:  She is prescribed levothyroxine  Her most recent TSH was slightly elevated at 4 797  Her level before that in May 2019 was normal at 1 430  She is taking levothyroxine 25 mcg daily  She denies skipping any doses  Hyperlipidemia:  She is prescribed simvastatin  She is tolerating without myalgias  Her most recent lipid panel showed an HDL of 138 and an LDL of 103  Atrial fibrillation:  No recent palpitations or hospitalizations  She is prescribed metoprolol and Xarelto  She denies any recent falls or bleeding episodes  Labs show that her recent fasting glucose was slightly elevated at 136  Also her GFR declined from 70 to 46  Review of Systems:     Review of Systems   Constitutional: Negative for activity change, appetite change and fatigue  Respiratory: Negative for apnea, cough, chest tightness, shortness of breath and wheezing  Cardiovascular: Negative for chest pain, palpitations and leg swelling  Gastrointestinal: Negative for abdominal distention, abdominal pain, blood in stool, constipation, diarrhea, nausea and vomiting  Musculoskeletal: Positive for arthralgias  Negative for back pain, gait problem, joint swelling and myalgias  Skin: Negative for rash and wound  Neurological: Negative for dizziness, weakness, light-headedness, numbness and headaches  Psychiatric/Behavioral: Negative for behavioral problems, confusion, hallucinations, sleep disturbance and suicidal ideas  The patient is not nervous/anxious         Problem List:     Patient Active Problem List   Diagnosis    Atrial fibrillation ([facility] [address] )    Benign essential hypertension    Chronic anticoagulation    Anxiety    Esophageal ring, acquired    Gastroesophageal reflux disease with esophagitis    Hypercholesterolemia    Hypothyroidism  Osteoporosis    [facility]oma [facility] [address] [facility]    Third nerve palsy of left eye    Vitamin D deficiency    Chronic tension-type headache, not intractable      Objective:     /70 (BP Location: Right arm, Patient Position: Sitting, Cuff Size: Standard)   Pulse (!) 53   Wt 72 3 kg (159 lb 6 4 oz) Comment: w/ shoes denied off  SpO2 97%   BMI 26 94 kg/m²     Physical Exam   Constitutional: She is oriented to person, place, and time  She appears well-developed and well-nourished  No distress  Eyes: Conjunctivae are normal  Right eye exhibits no discharge  Left eye exhibits no discharge  No scleral icterus  Neck: Neck supple  No JVD present  No thyromegaly present  Cardiovascular: Normal rate and normal heart sounds  An irregularly irregular rhythm present  No murmur heard  Pulmonary/Chest: Effort normal and breath sounds normal  No respiratory distress  She has no wheezes  She has no rales  She exhibits no tenderness  Abdominal: Soft  Bowel sounds are normal  She exhibits no distension  There is no tenderness  Musculoskeletal: She exhibits deformity (left elbow in sling)  She exhibits no edema  Lymphadenopathy:     She has no cervical adenopathy  Neurological: She is alert and oriented to person, place, and time  Skin: Skin is warm and dry  She is not diaphoretic  Psychiatric: She has a normal mood and affect  Her behavior is normal    Vitals reviewed         Skylar Mays DO  [facility]

## 2019-09-17 NOTE — ASSESSMENT & PLAN NOTE
MRI is stable  Bromocriptine was increased back in July 2019  Prolactin levels responded appropriately  Will monitor

## 2019-09-17 NOTE — ASSESSMENT & PLAN NOTE
Blood pressure has been running on the lower side  Stop lisinopril  Continue midodrine  Continue metoprolol

## 2019-10-04 DIAGNOSIS — I10 ESSENTIAL HYPERTENSION: ICD-10-CM

## 2019-10-04 RX ORDER — LISINOPRIL 5 MG/1
TABLET ORAL
Qty: 90 TABLET | Refills: 0 | Status: SHIPPED | OUTPATIENT
Start: 2019-10-04 | End: 2019-12-29 | Stop reason: SDUPTHER

## 2019-10-25 ENCOUNTER — TELEPHONE (OUTPATIENT)
Dept: INTERNAL MEDICINE CLINIC | Facility: CLINIC | Age: 74
End: 2019-10-25

## 2019-10-25 ENCOUNTER — APPOINTMENT (OUTPATIENT)
Dept: LAB | Facility: CLINIC | Age: 74
End: 2019-10-25
Payer: MEDICARE

## 2019-10-25 DIAGNOSIS — R73.01 IMPAIRED FASTING GLUCOSE: ICD-10-CM

## 2019-10-25 DIAGNOSIS — I10 BENIGN ESSENTIAL HYPERTENSION: Chronic | ICD-10-CM

## 2019-10-25 DIAGNOSIS — E03.9 ACQUIRED HYPOTHYROIDISM: ICD-10-CM

## 2019-10-25 LAB
ANION GAP SERPL CALCULATED.3IONS-SCNC: 7 MMOL/L (ref 4–13)
BUN SERPL-MCNC: 16 MG/DL (ref 5–25)
CALCIUM SERPL-MCNC: 9.6 MG/DL (ref 8.3–10.1)
CHLORIDE SERPL-SCNC: 108 MMOL/L (ref 100–108)
CO2 SERPL-SCNC: 24 MMOL/L (ref 21–32)
CREAT SERPL-MCNC: 0.96 MG/DL (ref 0.6–1.3)
EST. AVERAGE GLUCOSE BLD GHB EST-MCNC: 103 MG/DL
GFR SERPL CREATININE-BSD FRML MDRD: 59 ML/MIN/1.73SQ M
GLUCOSE P FAST SERPL-MCNC: 98 MG/DL (ref 65–99)
HBA1C MFR BLD: 5.2 % (ref 4.2–6.3)
POTASSIUM SERPL-SCNC: 3.9 MMOL/L (ref 3.5–5.3)
SODIUM SERPL-SCNC: 139 MMOL/L (ref 136–145)
TSH SERPL DL<=0.05 MIU/L-ACNC: 1.08 UIU/ML (ref 0.36–3.74)

## 2019-10-25 PROCEDURE — 83036 HEMOGLOBIN GLYCOSYLATED A1C: CPT

## 2019-10-25 PROCEDURE — 80048 BASIC METABOLIC PNL TOTAL CA: CPT

## 2019-10-25 PROCEDURE — 36415 COLL VENOUS BLD VENIPUNCTURE: CPT

## 2019-10-25 PROCEDURE — 84443 ASSAY THYROID STIM HORMONE: CPT

## 2019-10-25 NOTE — TELEPHONE ENCOUNTER
----- Message from Destiny Alex DO sent at 10/25/2019  3:10 PM EDT -----  Call patient and let her know that I reviewed their recent laboratory testing and labs were normal

## 2019-11-04 DIAGNOSIS — I48.91 ATRIAL FIBRILLATION, UNSPECIFIED TYPE (HCC): ICD-10-CM

## 2019-11-04 RX ORDER — RIVAROXABAN 20 MG/1
TABLET, FILM COATED ORAL
Qty: 90 TABLET | Refills: 1 | Status: SHIPPED | OUTPATIENT
Start: 2019-11-04 | End: 2020-05-01

## 2019-11-13 NOTE — PROGRESS NOTES
Assessment/Plan:    No problem-specific Assessment & Plan notes found for this encounter  There are no diagnoses linked to this encounter  Subjective:      Patient ID: Vishal Aranda is a 68 y o  female  Patient presents in follow up for her medical problems  She had a left elbow fracture and had a titanium replacement done  She is in PT  The following portions of the patient's history were reviewed and updated as appropriate: allergies, current medications, past family history, past medical history, past social history, past surgical history and problem list     Review of Systems   Constitutional: Negative for activity change, appetite change, chills, diaphoresis, fatigue, fever and unexpected weight change  HENT: Negative for congestion, dental problem, drooling, ear discharge, ear pain, facial swelling, hearing loss, mouth sores, nosebleeds, postnasal drip, rhinorrhea, sinus pressure, sinus pain, sneezing, sore throat, tinnitus, trouble swallowing and voice change  Eyes: Negative for photophobia, pain, discharge, redness, itching and visual disturbance  Respiratory: Negative for apnea, cough, choking, chest tightness, shortness of breath, wheezing and stridor  Cardiovascular: Negative for chest pain, palpitations and leg swelling  Gastrointestinal: Negative for abdominal distention, abdominal pain, anal bleeding, blood in stool, constipation, diarrhea, nausea, rectal pain and vomiting  Endocrine: Negative for cold intolerance, heat intolerance, polydipsia, polyphagia and polyuria  Genitourinary: Negative for decreased urine volume, difficulty urinating, dysuria, enuresis, flank pain, frequency, genital sores, hematuria and urgency  Musculoskeletal: Negative for arthralgias, back pain, gait problem, joint swelling, myalgias, neck pain and neck stiffness  Skin: Negative for color change, pallor, rash and wound     Allergic/Immunologic: Negative for environmental allergies, food allergies and immunocompromised state  Neurological: Negative for dizziness, tremors, seizures, syncope, facial asymmetry, speech difficulty, weakness, light-headedness, numbness and headaches  Hematological: Negative for adenopathy  Does not bruise/bleed easily  Psychiatric/Behavioral: Negative for agitation, self-injury, sleep disturbance and suicidal ideas  The patient is not nervous/anxious  Objective:      /62   Pulse 75   Ht 5' 4 5" (1 638 m)   Wt 71 7 kg (158 lb)   SpO2 97%   BMI 26 70 kg/m²          Physical Exam   Constitutional: She is oriented to person, place, and time  She appears well-developed and well-nourished  No distress  HENT:   Head: Normocephalic and atraumatic  Right Ear: External ear normal    Left Ear: External ear normal    Mouth/Throat: Oropharynx is clear and moist    Eyes: Pupils are equal, round, and reactive to light  Conjunctivae and EOM are normal  No scleral icterus  Neck: Normal range of motion  Neck supple  No JVD present  No tracheal deviation present  No thyromegaly present  Cardiovascular: Normal rate, regular rhythm and intact distal pulses  Exam reveals no gallop and no friction rub  No murmur heard  Pulmonary/Chest: Effort normal and breath sounds normal  No respiratory distress  She has no wheezes  She has no rales  She exhibits no tenderness  Abdominal: Soft  Bowel sounds are normal  She exhibits no distension and no mass  There is no tenderness  There is no rebound and no guarding  Musculoskeletal: Normal range of motion  She exhibits no edema, tenderness or deformity  Lymphadenopathy:     She has no cervical adenopathy  Neurological: She is alert and oriented to person, place, and time  She has normal reflexes  No cranial nerve deficit  Coordination normal    Skin: Skin is warm and dry  No rash noted  She is not diaphoretic  No erythema  No pallor  Psychiatric: She has a normal mood and affect   Her behavior is normal  Judgment and thought content normal  Immunohistochemistry ([id number] and [id number]) billing is not performed here. Please use the Immunohistochemistry Stain Billing plan to accomplish this. Professional Component, Technical Component Or Both?: professional component Rendering Text In Billing: The slides were read, and reported in an attached document. Rendering Text In Billing: The biopsy specimen was grossed and processed into a slide. Number Of Group I Special Stains Used (34914): None Rendering Text In Billing: The biopsy specimens were grossed and processed into slides. Number Of Specimens Per Diagnosis: 1 Cpt Code (46380, 93416 Or 49931): 51419 Detail Level: Detailed

## 2019-11-21 DIAGNOSIS — E03.9 HYPOTHYROIDISM, UNSPECIFIED TYPE: ICD-10-CM

## 2019-11-21 DIAGNOSIS — K21.9 CHRONIC GERD: ICD-10-CM

## 2019-11-21 RX ORDER — LEVOTHYROXINE SODIUM 0.03 MG/1
TABLET ORAL
Qty: 90 TABLET | Refills: 0 | Status: SHIPPED | OUTPATIENT
Start: 2019-11-21 | End: 2020-02-25 | Stop reason: CLARIF

## 2019-11-21 RX ORDER — PANTOPRAZOLE SODIUM 40 MG/1
40 TABLET, DELAYED RELEASE ORAL
Qty: 90 TABLET | Refills: 3 | Status: SHIPPED | OUTPATIENT
Start: 2019-11-21 | End: 2020-11-30

## 2019-12-03 DIAGNOSIS — F41.9 ANXIETY: ICD-10-CM

## 2019-12-03 RX ORDER — LORAZEPAM 1 MG/1
TABLET ORAL
Qty: 60 TABLET | Refills: 3 | Status: SHIPPED | OUTPATIENT
Start: 2019-12-03 | End: 2020-04-04

## 2019-12-18 DIAGNOSIS — D35.2 PITUITARY ADENOMA (HCC): ICD-10-CM

## 2019-12-18 RX ORDER — BROMOCRIPTINE MESYLATE 2.5 MG/1
5 TABLET ORAL 2 TIMES DAILY
Qty: 120 TABLET | Refills: 6 | Status: SHIPPED | OUTPATIENT
Start: 2019-12-18 | End: 2020-06-04

## 2019-12-18 NOTE — TELEPHONE ENCOUNTER
Please check if Dr Darlene Sloan was giving her this medication and if he S then you can send me the refill otherwise she will need to speak to her family physician

## 2019-12-18 NOTE — TELEPHONE ENCOUNTER
Patient stopped by the office requesting to see if we can prescribed her bromocriptine (PARLODEL) 2 5 mg tablet   She stated on her last office visit Dr Rita Durand said she should take 2 tablets in the morning and 2 in the night  She ran out of her medication she only has  2 tablets left  The pharmacy is giving her a hartime to refill her medciaton  Her refill is not until January 9       [facility]

## 2019-12-18 NOTE — TELEPHONE ENCOUNTER
Prescribed by Dr Polina Gerardo as indicated below           bromocriptine (PARLODEL) 2 5 mg tablet [[id number]]     Order Details   Dose: 5 mg Route: Oral Frequency: 2 times daily   Dispense Quantity: 120 tablet Refills: 6 Fills remaining: --           Sig: Take 2 tablets (5 mg total) by mouth 2 (two) times a day Take 1 tablet in a m  and 2 tablets in p m           Written Date: 08/22/19 Expiration Date: 08/21/20     Start Date: 08/22/19 End Date: --            Ordering Provider: -- Phone:  -- Fax:  --    Address:  -- LANEY #:  -- NPI:  --           Authorizing Provider: Elsa Calixto MD

## 2019-12-29 DIAGNOSIS — I10 ESSENTIAL HYPERTENSION: ICD-10-CM

## 2019-12-30 RX ORDER — LISINOPRIL 5 MG/1
TABLET ORAL
Qty: 90 TABLET | Refills: 3 | Status: SHIPPED | OUTPATIENT
Start: 2019-12-30 | End: 2020-12-17

## 2020-01-13 ENCOUNTER — TELEPHONE (OUTPATIENT)
Dept: NEUROLOGY | Facility: CLINIC | Age: 75
End: 2020-01-13

## 2020-01-20 ENCOUNTER — TELEPHONE (OUTPATIENT)
Dept: INTERNAL MEDICINE CLINIC | Facility: CLINIC | Age: 75
End: 2020-01-20

## 2020-01-20 DIAGNOSIS — Z12.31 ENCOUNTER FOR MAMMOGRAM TO ESTABLISH BASELINE MAMMOGRAM: Primary | ICD-10-CM

## 2020-01-21 ENCOUNTER — OFFICE VISIT (OUTPATIENT)
Dept: CARDIOLOGY CLINIC | Facility: CLINIC | Age: 75
End: 2020-01-21
Payer: MEDICARE

## 2020-01-21 VITALS
DIASTOLIC BLOOD PRESSURE: 78 MMHG | WEIGHT: 161 LBS | SYSTOLIC BLOOD PRESSURE: 128 MMHG | OXYGEN SATURATION: 97 % | HEART RATE: 82 BPM | BODY MASS INDEX: 26.82 KG/M2 | HEIGHT: 65 IN

## 2020-01-21 DIAGNOSIS — Z79.01 CHRONIC ANTICOAGULATION: ICD-10-CM

## 2020-01-21 DIAGNOSIS — I48.20 CHRONIC ATRIAL FIBRILLATION (HCC): Primary | ICD-10-CM

## 2020-01-21 DIAGNOSIS — I95.1 ORTHOSTATIC HYPOTENSION: ICD-10-CM

## 2020-01-21 DIAGNOSIS — F41.9 ANXIETY: ICD-10-CM

## 2020-01-21 PROCEDURE — 99213 OFFICE O/P EST LOW 20 MIN: CPT | Performed by: INTERNAL MEDICINE

## 2020-01-21 NOTE — PROGRESS NOTES
[facility] [address] [place] [id number]  Cardiology Follow Up    Mingo Saldana  1945  [id number]      1  Chronic atrial fibrillation     2  Chronic anticoagulation     3  Orthostatic hypotension     4  Anxiety         Chief Complaint   Patient presents with    Follow-up    Atrial Fibrillation       Interval History:  Patient presents for follow-up visit  Patient denies any history of chest pain shortness of breath  Patient denies any history of leg edema or orthopnea PND  No history of presyncope syncope  Patient states compliance with the present list of medications  Patient denies any bleeding issues  Patient is followed by Neurology for pituitary tumor      Patient Active Problem List   Diagnosis    Atrial fibrillation ([facility] )    Benign essential hypertension    Chronic anticoagulation    Anxiety    Esophageal ring, acquired    Gastroesophageal reflux disease with esophagitis    Hypercholesterolemia    Hypothyroidism    Osteoporosis    [facility] )    Third nerve palsy of left eye    Vitamin D deficiency    Chronic tension-type headache, not intractable     Past Medical History:   Diagnosis Date    A-fib ([facility] )     Abnormal brain MRI     Anemia     last assessed: 12/14/2015    Ankle fracture     Anxiety     last assessed: 11/14/2017    Atrial fibrillation ([facility] )     last assessed: 1/18/2015    Blepharoptosis     Brain mass     Cataract     Colon polyps     Depression     Disease of thyroid gland     Dysphagia     Elevated prolactin level (HCC)     Esophageal perforation     Fracture, shoulder     GERD (gastroesophageal reflux disease)     Headache     Hypercholesterolemia     Hyperlipidemia     [facility]lactin[facility] )     Hypertension     Hypotension     Hypothyroidism     last assessed: 11/20/2017    Left heart failure (HCC)     Loss of smell     Lumbago with sciatica     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[facility] )     last assessed: 7/23/2015    Migraine     Obesity     Osteoporosis     Pituitary adenoma ([facility] )     last assessed: 11/20/2017    Pituitary tumor     Prolactinoma ([facility] )     Renal failure     Syncope     Third nerve palsy     Unstable balance     Vitamin D deficiency     Wrist fracture      Social History     Socioeconomic History    Marital status:      Spouse name: Not on file    Number of children: Not on file    Years of education: completed technical school    Highest education level: Not on file   Occupational History    Occupation: Xray tech     Comment: retired   Social Needs    Financial resource strain: Not on file    Food insecurity:     Worry: Not on file     Inability: Not on file   [employer] needs:     Medical: Not on file     Non-medical: Not on file   Tobacco Use    Smoking status: Never Smoker    Smokeless tobacco: Never Used   Substance and Sexual Activity    Alcohol use: Yes     Frequency: 2-4 times a month     Drinks per session: 1 or 2     Binge frequency: Never     Comment: social; drinks wine    Drug use: No    Sexual activity: Not Currently   Lifestyle    Physical activity:     Days per week: 0 days     Minutes per session: 0 min    Stress:  Only a little   Relationships    Social connections:     Talks on phone: Not on file     Gets together: Not on file     Attends Taoist service: Not on file     Active member of club or organization: Not on file     Attends meetings of clubs or organizations: Not on file     Relationship status: Not on file    Intimate partner violence:     Fear of current or ex partner: Not on file     Emotionally abused: Not on file     Physically abused: Not on file     Forced sexual activity: Not on file   Other Topics Concern    Not on file   Social History Narrative    Functioning activity level-participates in moderate activities both inside and outside of the home    Lives independently      Family History   Problem Relation Age of Onset    Osteoporosis Mother    Yan Best Other Mother         sepsis    Other Father         cerebellar hemorrhage; hemorrhage in brain    Hypertension Father     Thyroid disease Sister         thyroid disorder    Colon cancer Maternal Grandmother     Heart attack Maternal Grandfather         acute myocardial infarction    Stomach cancer Paternal Grandmother     Other Paternal Grandfather         urinary retention    Stroke Paternal Uncle         syndrome     Past Surgical History:   Procedure Laterality Date    APPENDECTOMY      CATARACT EXTRACTION      CORONARY ANGIOPLASTY WITH STENT PLACEMENT  01/2016    DILATION AND CURETTAGE OF UTERUS      ELBOW SURGERY Left     replacement    ESOPHAGOGASTRODUODENOSCOPY  08/29/2012    diagnostic    FIXATION KYPHOPLASTY      HYSTERECTOMY      NV ESOPHAGOGASTRODUODENOSCOPY TRANSORAL DIAGNOSTIC N/A 1/25/2018    Procedure: ESOPHAGOGASTRODUODENOSCOPY (EGD); Surgeon: Anita Dangelo MD;  Location: [facility];   Service: Gastroenterology    TONSILLECTOMY      TOTAL ABDOMINAL HYSTERECTOMY W/ BILATERAL SALPINGOOPHORECTOMY      TOTAL ELBOW REPLACEMENT  02/07/2019    VERTEBRAL AUGMENTATION      percutaneous vertebral augmentation kyphoplasty       Current Outpatient Medications:     amoxicillin (AMOXIL) 500 MG tablet, Take 500 mg by mouth 4 tablets 1 hour before dental procedure, Disp: , Rfl:     bromocriptine (PARLODEL) 2 5 mg tablet, Take 2 tablets (5 mg total) by mouth 2 (two) times a day, Disp: 120 tablet, Rfl: 6    Cholecalciferol (VITAMIN D) 2000 units CAPS, Take 1 tablet by mouth daily, Disp: , Rfl:     levothyroxine 25 mcg tablet, TAKE 1 TABLET BY MOUTH EVERY DAY, Disp: 90 tablet, Rfl: 0    levothyroxine 50 mcg tablet, Take 1 tablet (50 mcg total) by mouth daily, Disp: 90 tablet, Rfl: 3    lisinopril (ZESTRIL) 5 mg tablet, TAKE 1 TABLET BY MOUTH EVERY DAY, Disp: 90 tablet, Rfl: 3    LORazepam (ATIVAN) 1 mg tablet, TAKE1 TABLET BY MOUTH 2 TIMES A DAY AS NEEDED FOR ANXIETY, Disp: 60 tablet, Rfl: 3    metoprolol tartrate (LOPRESSOR) 50 mg tablet, Take 1 tablet (50 mg total) by mouth 2 (two) times a day, Disp: , Rfl:     midodrine (PROAMATINE) 2 5 mg tablet, Take 1 tablet (2 5 mg total) by mouth 3 (three) times a day, Disp: 270 tablet, Rfl: 3    pantoprazole (PROTONIX) 40 mg tablet, Take 1 tablet (40 mg total) by mouth daily before breakfast, Disp: 90 tablet, Rfl: 3    simvastatin (ZOCOR) 20 mg tablet, TAKE 1 TABLET DAILY AT BEDTIME , Disp: 90 tablet, Rfl: 3    XARELTO 20 MG tablet, TAKE 1 TABLET BY MOUTH EVERY DAY, Disp: 90 tablet, Rfl: 1    butalbital-acetaminophen-caffeine (FIORICET,ESGIC) -40 mg per tablet, Take 1 tablet by mouth 2 (two) times a day as needed for headaches (headache) (Patient not taking: Reported on 1/21/2020), Disp: 60 tablet, Rfl: 5  Allergies   Allergen Reactions    Cephalosporins Hives    Keflex [Cephalexin] Hives       Labs:  No visits with results within 2 Month(s) from this visit  Latest known visit with results is:   Appointment on 10/25/2019   Component Date Value    Sodium 10/25/2019 139     Potassium 10/25/2019 3 9     Chloride 10/25/2019 108     CO2 10/25/2019 24     ANION GAP 10/25/2019 7     BUN 10/25/2019 16     Creatinine 10/25/2019 0 96     Glucose, Fasting 10/25/2019 98     Calcium 10/25/2019 9 6     eGFR 10/25/2019 59     Hemoglobin A1C 10/25/2019 5 2     EAG 10/25/2019 103     TSH 3RD GENERATON 10/25/2019 1 080      Imaging: No results found      Review of Systems:  Review of Systems   REVIEW OF SYSTEMS:  Constitutional:  Denies fever or chills   Eyes:  Denies change in visual acuity   HENT:  Denies nasal congestion or sore throat   Respiratory:  Denies cough or shortness of breath   Cardiovascular:  Denies chest pain or edema   GI:  Denies abdominal pain, nausea, vomiting, bloody stools or diarrhea   :  Denies dysuria, frequency, difficulty in micturition and nocturia  Musculoskeletal:  Denies back pain or joint pain   Neurologic:  Denies headache, focal weakness or sensory changes   Endocrine:  Denies polyuria or polydipsia   Lymphatic:  Denies swollen glands   Psychiatric:  Denies depression or anxiety     Physical Exam:    /78   Pulse 82   Ht 5' 4 5" (1 638 m)   Wt 73 kg (161 lb)   SpO2 97%   BMI 27 21 kg/m²     Physical Exam   PHYSICAL EXAM:  General:  Patient is not in acute distress   Head: Normocephalic, Atraumatic  HEENT:  Both pupils normal-size atraumatic, normocephalic, nonicteric  Neck:  JVP not raised  Trachea central  No carotid bruit  Respiratory:  normal breath sounds no crackles  no rhonchi  Cardiovascular:  Irregularly irregular  GI:  Abdomen soft nontender  No organomegaly  Lymphatic:  No cervical or inguinal lymphadenopathy  Neurologic:  Patient is awake alert, oriented   Grossly nonfocal  Extremities trace edema    Discussion/Summary:  Patient with multiple medical problems who seems to be doing reasonably well from cardiac standpoint  Previous studies reviewed with patient  Medications reviewed and possible side effects discussed  concepts of cardiovascular disease , signs and symptoms of heart disease  Dietary and risk factor modification reinforced  All questions answered  Safety measures reviewed  Patient advised to report any problems prompting medical attention  Risks and benefits  and alternativesof anticoagulation to prevent thromboembolic risk from atrial fibrillation discussed at length  Patient to report any bleeding issues  Follow-up with primary care physician as well as Neurology  Patient is agreeable with the plan of care  Follow-up in 6 months

## 2020-01-29 ENCOUNTER — TELEPHONE (OUTPATIENT)
Dept: INTERNAL MEDICINE CLINIC | Facility: CLINIC | Age: 75
End: 2020-01-29

## 2020-01-29 DIAGNOSIS — E03.9 ACQUIRED HYPOTHYROIDISM: Primary | Chronic | ICD-10-CM

## 2020-01-29 DIAGNOSIS — I10 BENIGN ESSENTIAL HYPERTENSION: Chronic | ICD-10-CM

## 2020-01-29 DIAGNOSIS — E78.00 HYPERCHOLESTEROLEMIA: Chronic | ICD-10-CM

## 2020-01-29 NOTE — TELEPHONE ENCOUNTER
Pt has an appointment scheduled for 2/25/2020, wants to know if she should have any labs prior       Mail orders to pt     [phone]

## 2020-01-30 ENCOUNTER — OFFICE VISIT (OUTPATIENT)
Dept: NEUROLOGY | Facility: CLINIC | Age: 75
End: 2020-01-30
Payer: MEDICARE

## 2020-01-30 VITALS
HEIGHT: 65 IN | BODY MASS INDEX: 27.29 KG/M2 | SYSTOLIC BLOOD PRESSURE: 118 MMHG | WEIGHT: 163.8 LBS | HEART RATE: 66 BPM | DIASTOLIC BLOOD PRESSURE: 62 MMHG

## 2020-01-30 DIAGNOSIS — D35.2 PITUITARY ADENOMA (HCC): Primary | Chronic | ICD-10-CM

## 2020-01-30 DIAGNOSIS — G44.229 CHRONIC TENSION-TYPE HEADACHE, NOT INTRACTABLE: ICD-10-CM

## 2020-01-30 DIAGNOSIS — G44.039 PAROXYSMAL HEMICRANIA: ICD-10-CM

## 2020-01-30 DIAGNOSIS — D35.2 PITUITARY ADENOMA (HCC): ICD-10-CM

## 2020-01-30 PROCEDURE — 99214 OFFICE O/P EST MOD 30 MIN: CPT | Performed by: PSYCHIATRY & NEUROLOGY

## 2020-01-30 RX ORDER — GABAPENTIN 100 MG/1
100 CAPSULE ORAL 2 TIMES DAILY
Qty: 60 CAPSULE | Refills: 2 | Status: SHIPPED | OUTPATIENT
Start: 2020-01-30 | End: 2020-04-22

## 2020-01-30 NOTE — TELEPHONE ENCOUNTER
During the patient's office visit Dr Gm Wills requested for the clinical team to appeal the [address] [place] [address] Drive for the medication   Document is in the patient's chart and routed to the Clinical team

## 2020-01-30 NOTE — PROGRESS NOTES
Progress Note - Neurology   Albino Anderson 69 y o  female MRN: [id number]  Unit/Bed#:  Encounter: [id number]      Subjective:   Patient is here for a follow-up visit for headaches left retro-orbital which are not migraines and related to her pituitary adenoma as well as pressure type pain in the left retro-orbital head region for which she uses Fioricet 1 tablet twice a day on a p r n  Basis  Patient generally gets prompt relief of symptoms  Recently has been experiencing sharp lancinating pains around the left eye which are brief in duration and also experiences pain in the left elbow radiating to the left hand  Patient underwent extensive elbow and arm surgery last year after suffering a fall and multiple fractures  She remains on Parlodel 5 mg twice a day and most recent prolactin level was normal   Patient is also followed up with Cardiology on a regular basis  Blood pressure has been stable  ROS:   Review of Systems   Constitutional: Negative  Negative for appetite change and fever  HENT: Negative  Negative for hearing loss, tinnitus, trouble swallowing and voice change  Eyes: Positive for pain (Increased    Left Eye)  Negative for photophobia  Respiratory: Negative  Negative for shortness of breath  Cardiovascular: Negative  Negative for palpitations  Gastrointestinal: Negative  Negative for nausea and vomiting  Endocrine: Negative  Negative for cold intolerance and heat intolerance  Genitourinary: Negative  Negative for dysuria, frequency and urgency  Musculoskeletal: Negative  Negative for myalgias and neck pain  Skin: Negative  Negative for rash  Neurological: Negative  Negative for dizziness, tremors, seizures, syncope, facial asymmetry, speech difficulty, weakness, light-headedness, numbness and headaches  Hematological: Negative  Does not bruise/bleed easily  Psychiatric/Behavioral: Negative  Negative for confusion, hallucinations and sleep disturbance       MA review of systems was reviewed by myself  Vitals:   Vitals:    01/30/20 1457   BP: 118/62   BP Location: Right arm   Patient Position: Sitting   Cuff Size: Adult   Pulse: 66   Weight: 74 3 kg (163 lb 12 8 oz)   Height: 5' 4 5" (1 638 m)   ,Body mass index is 27 68 kg/m²      MEDS:      Current Outpatient Medications:     amoxicillin (AMOXIL) 500 MG tablet, Take 500 mg by mouth 4 tablets 1 hour before dental procedure, Disp: , Rfl:     bromocriptine (PARLODEL) 2 5 mg tablet, Take 2 tablets (5 mg total) by mouth 2 (two) times a day, Disp: 120 tablet, Rfl: 6    butalbital-acetaminophen-caffeine (FIORICET,ESGIC) -40 mg per tablet, Take 1 tablet by mouth 2 (two) times a day as needed for headaches (headache), Disp: 60 tablet, Rfl: 5    Cholecalciferol (VITAMIN D) 2000 units CAPS, Take 1 tablet by mouth daily, Disp: , Rfl:     levothyroxine 50 mcg tablet, Take 1 tablet (50 mcg total) by mouth daily, Disp: 90 tablet, Rfl: 3    lisinopril (ZESTRIL) 5 mg tablet, TAKE 1 TABLET BY MOUTH EVERY DAY, Disp: 90 tablet, Rfl: 3    LORazepam (ATIVAN) 1 mg tablet, TAKE1 TABLET BY MOUTH 2 TIMES A DAY AS NEEDED FOR ANXIETY, Disp: 60 tablet, Rfl: 3    metoprolol tartrate (LOPRESSOR) 50 mg tablet, Take 1 tablet (50 mg total) by mouth 2 (two) times a day, Disp: , Rfl:     midodrine (PROAMATINE) 2 5 mg tablet, Take 1 tablet (2 5 mg total) by mouth 3 (three) times a day, Disp: 270 tablet, Rfl: 3    pantoprazole (PROTONIX) 40 mg tablet, Take 1 tablet (40 mg total) by mouth daily before breakfast, Disp: 90 tablet, Rfl: 3    simvastatin (ZOCOR) 20 mg tablet, TAKE 1 TABLET DAILY AT BEDTIME , Disp: 90 tablet, Rfl: 3    XARELTO 20 MG tablet, TAKE 1 TABLET BY MOUTH EVERY DAY, Disp: 90 tablet, Rfl: 1    levothyroxine 25 mcg tablet, TAKE 1 TABLET BY MOUTH EVERY DAY (Patient not taking: Reported on 1/30/2020), Disp: 90 tablet, Rfl: 0  :    Physical Exam:  General appearance: alert, appears stated age and cooperative  Head: Normocephalic, without obvious abnormality, atraumatic    On examination patient is alert awake oriented, speech is fluent, cranial nerves 2-12 intact with no eye movement abnormalities noted, and on motor and sensory exam there is limited strength testing in the left upper extremity due to her limited range of motion at the elbow and the shoulder, strength is otherwise preserved in all other extremities, deep tendon reflexes are hypoactive, no evidence of any sensory loss was noted, no dysmetria was noted and her gait is normal based  No bruits were appreciable in the neck  Lab Results: I have personally reviewed pertinent reports  Imaging Studies: I have personally reviewed pertinent reports  Assessment:  1  Pituitary adenoma , status post 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 nerve palsy and cavernous sinus involvement causing tension-type headaches  2  Paroxysmal hemicrania of recent onset  Plan:  Patient is advised to continue Fioricet on a p r n  Basis, continue Parlodel 5 mg twice a day she is scheduled for a repeat prolactin level and an MRI of the brain in July of this year, overall has been stable except for these headaches and will start her on gabapentin 100 mg twice a day which should help with the paroxysmal hemicrania  Danielle [place] She will return back to see me in 6 months  1/30/2020,3:04 PM    Dictation voice to text software has been used in the creation of this document  Please consider this in light of any contextual or grammatical errors

## 2020-01-31 RX ORDER — BUTALBITAL, ACETAMINOPHEN AND CAFFEINE 50; 325; 40 MG/1; MG/1; MG/1
1 TABLET ORAL 2 TIMES DAILY PRN
Qty: 60 TABLET | Refills: 5 | Status: SHIPPED | OUTPATIENT
Start: 2020-01-31 | End: 2020-07-08 | Stop reason: SDUPTHER

## 2020-01-31 NOTE — TELEPHONE ENCOUNTER
Call placed to pharmacy to obtain insurance information to process PA  Pharmacy stated they were able to run script with no prior auth being needed  They did however need a new script due to the prior script being outdated  Please sign off if agreeable

## 2020-02-11 ENCOUNTER — TELEPHONE (OUTPATIENT)
Dept: NEUROLOGY | Facility: CLINIC | Age: 75
End: 2020-02-11

## 2020-02-19 ENCOUNTER — APPOINTMENT (OUTPATIENT)
Dept: LAB | Facility: CLINIC | Age: 75
End: 2020-02-19
Payer: MEDICARE

## 2020-02-19 DIAGNOSIS — E78.00 HYPERCHOLESTEROLEMIA: Chronic | ICD-10-CM

## 2020-02-19 DIAGNOSIS — I10 BENIGN ESSENTIAL HYPERTENSION: Chronic | ICD-10-CM

## 2020-02-19 DIAGNOSIS — I10 BENIGN ESSENTIAL HYPERTENSION: Primary | Chronic | ICD-10-CM

## 2020-02-19 DIAGNOSIS — E03.9 ACQUIRED HYPOTHYROIDISM: Chronic | ICD-10-CM

## 2020-02-19 LAB
ANION GAP SERPL CALCULATED.3IONS-SCNC: 7 MMOL/L (ref 4–13)
BASOPHILS # BLD AUTO: 0.08 THOUSANDS/ΜL (ref 0–0.1)
BASOPHILS NFR BLD AUTO: 1 % (ref 0–1)
BUN SERPL-MCNC: 19 MG/DL (ref 5–25)
CALCIUM SERPL-MCNC: 9.6 MG/DL (ref 8.3–10.1)
CHLORIDE SERPL-SCNC: 108 MMOL/L (ref 100–108)
CHOLEST SERPL-MCNC: 250 MG/DL (ref 50–200)
CO2 SERPL-SCNC: 23 MMOL/L (ref 21–32)
CREAT SERPL-MCNC: 1.01 MG/DL (ref 0.6–1.3)
EOSINOPHIL # BLD AUTO: 0.34 THOUSAND/ΜL (ref 0–0.61)
EOSINOPHIL NFR BLD AUTO: 5 % (ref 0–6)
ERYTHROCYTE [DISTWIDTH] IN BLOOD BY AUTOMATED COUNT: 15.4 % (ref 11.6–15.1)
GFR SERPL CREATININE-BSD FRML MDRD: 55 ML/MIN/1.73SQ M
GLUCOSE P FAST SERPL-MCNC: 97 MG/DL (ref 65–99)
HCT VFR BLD AUTO: 35.6 % (ref 34.8–46.1)
HDLC SERPL-MCNC: 115 MG/DL
HGB BLD-MCNC: 11.2 G/DL (ref 11.5–15.4)
IMM GRANULOCYTES # BLD AUTO: 0.01 THOUSAND/UL (ref 0–0.2)
IMM GRANULOCYTES NFR BLD AUTO: 0 % (ref 0–2)
LDLC SERPL CALC-MCNC: 118 MG/DL (ref 0–100)
LYMPHOCYTES # BLD AUTO: 1.39 THOUSANDS/ΜL (ref 0.6–4.47)
LYMPHOCYTES NFR BLD AUTO: 21 % (ref 14–44)
MCH RBC QN AUTO: 32.3 PG (ref 26.8–34.3)
MCHC RBC AUTO-ENTMCNC: 31.5 G/DL (ref 31.4–37.4)
MCV RBC AUTO: 103 FL (ref 82–98)
MONOCYTES # BLD AUTO: 0.66 THOUSAND/ΜL (ref 0.17–1.22)
MONOCYTES NFR BLD AUTO: 10 % (ref 4–12)
NEUTROPHILS # BLD AUTO: 4.2 THOUSANDS/ΜL (ref 1.85–7.62)
NEUTS SEG NFR BLD AUTO: 63 % (ref 43–75)
NONHDLC SERPL-MCNC: 135 MG/DL
NRBC BLD AUTO-RTO: 0 /100 WBCS
PLATELET # BLD AUTO: 188 THOUSANDS/UL (ref 149–390)
PMV BLD AUTO: 11.6 FL (ref 8.9–12.7)
POTASSIUM SERPL-SCNC: 4.3 MMOL/L (ref 3.5–5.3)
RBC # BLD AUTO: 3.47 MILLION/UL (ref 3.81–5.12)
SODIUM SERPL-SCNC: 138 MMOL/L (ref 136–145)
TRIGL SERPL-MCNC: 86 MG/DL
TSH SERPL DL<=0.05 MIU/L-ACNC: 1.36 UIU/ML (ref 0.36–3.74)
WBC # BLD AUTO: 6.68 THOUSAND/UL (ref 4.31–10.16)

## 2020-02-19 PROCEDURE — 85025 COMPLETE CBC W/AUTO DIFF WBC: CPT

## 2020-02-19 PROCEDURE — 80061 LIPID PANEL: CPT

## 2020-02-19 PROCEDURE — 36415 COLL VENOUS BLD VENIPUNCTURE: CPT

## 2020-02-19 PROCEDURE — 84443 ASSAY THYROID STIM HORMONE: CPT

## 2020-02-19 PROCEDURE — 80048 BASIC METABOLIC PNL TOTAL CA: CPT

## 2020-02-25 ENCOUNTER — OFFICE VISIT (OUTPATIENT)
Dept: INTERNAL MEDICINE CLINIC | Facility: CLINIC | Age: 75
End: 2020-02-25
Payer: MEDICARE

## 2020-02-25 VITALS
OXYGEN SATURATION: 93 % | BODY MASS INDEX: 28.22 KG/M2 | WEIGHT: 167 LBS | DIASTOLIC BLOOD PRESSURE: 80 MMHG | HEART RATE: 73 BPM | SYSTOLIC BLOOD PRESSURE: 120 MMHG

## 2020-02-25 DIAGNOSIS — E03.9 ACQUIRED HYPOTHYROIDISM: Chronic | ICD-10-CM

## 2020-02-25 DIAGNOSIS — I10 BENIGN ESSENTIAL HYPERTENSION: Chronic | ICD-10-CM

## 2020-02-25 DIAGNOSIS — D35.2 PITUITARY ADENOMA (HCC): Primary | Chronic | ICD-10-CM

## 2020-02-25 DIAGNOSIS — I48.20 CHRONIC ATRIAL FIBRILLATION (HCC): Chronic | ICD-10-CM

## 2020-02-25 DIAGNOSIS — E78.00 HYPERCHOLESTEROLEMIA: Chronic | ICD-10-CM

## 2020-02-25 PROCEDURE — 99214 OFFICE O/P EST MOD 30 MIN: CPT | Performed by: INTERNAL MEDICINE

## 2020-02-25 PROCEDURE — 3074F SYST BP LT 130 MM HG: CPT | Performed by: INTERNAL MEDICINE

## 2020-02-25 PROCEDURE — 3079F DIAST BP 80-89 MM HG: CPT | Performed by: INTERNAL MEDICINE

## 2020-02-25 PROCEDURE — 4040F PNEUMOC VAC/ADMIN/RCVD: CPT | Performed by: INTERNAL MEDICINE

## 2020-02-25 PROCEDURE — 1036F TOBACCO NON-USER: CPT | Performed by: INTERNAL MEDICINE

## 2020-02-25 PROCEDURE — 1160F RVW MEDS BY RX/DR IN RCRD: CPT | Performed by: INTERNAL MEDICINE

## 2020-02-25 NOTE — ASSESSMENT & PLAN NOTE
Continue to f/u with neurology  Has repeat proloactin levels and MRI for later in the summer  Continue current dose of parlodel

## 2020-02-25 NOTE — PROGRESS NOTES
INTERNAL MEDICINE FOLLOW-UP OFFICE VISIT  [facility]    NAME: Bianca Novak  AGE: 69 y o  SEX: female  : 1945     DATE: 2020     Assessment and Plan:     1  Pituitary adenoma [facility])  Assessment & Plan:  Continue to f/u with neurology  Has repeat proloactin levels and MRI for later in the summer  Continue current dose of parlodel  2  Hypercholesterolemia  Assessment & Plan:  Continue statin as prescribed      3  Acquired hypothyroidism  Assessment & Plan:  Thyroid function has been stable on levothyroxine 50mcg  4  Chronic atrial fibrillation  Assessment & Plan:  Stable on xarelto and lopressor  5  Benign essential hypertension  Assessment & Plan:  Continue current anti-hypertensives  BP is stable  Orders:  -     BUN; Future  -     Creatinine, serum; Future    BMI Counseling: Body mass index is 28 22 kg/m²  The BMI is above normal  Nutrition recommendations include decreasing portion sizes, encouraging healthy choices of fruits and vegetables, moderation in carbohydrate intake and increasing intake of lean protein  Return in about 6 months (around 2020) for Subsequent AWV  Chief Complaint:     Chief Complaint   Patient presents with    Follow-up     5 month and labs- 2020        History of Present Illness:     Patient presents for follow-up and to review labs  Has pituitary adenoma that is monitored by neurology  Has repeat MRI brain in the summer along with prolactin level  Currently prescribed bromocriptine  Denies any new neuro deficits  Has chronic tension type headaches - on gabapentin and fioricet  No recent hospitalizations or ER visits  Denies any cardiac symptoms  No recent falls  No recent bleeding episodes of anticoagulation  Does not want CRC or breast cancer screening  Cholesterol remains on the high side, but HDL is 115 ()        Review of Systems:     Review of Systems Constitutional: Negative for activity change, appetite change and fatigue  Respiratory: Negative for apnea, cough, chest tightness, shortness of breath and wheezing  Cardiovascular: Negative for chest pain, palpitations and leg swelling  Gastrointestinal: Negative for abdominal distention, abdominal pain, blood in stool, constipation, diarrhea, nausea and vomiting  Musculoskeletal: Positive for arthralgias  Negative for back pain, gait problem, joint swelling and myalgias  Skin: Negative for rash and wound  Neurological: Positive for headaches  Negative for dizziness, weakness, light-headedness and numbness  Psychiatric/Behavioral: Negative for behavioral problems, confusion, hallucinations, sleep disturbance and suicidal ideas  The patient is not nervous/anxious  Problem List:     Patient Active Problem List   Diagnosis    Atrial fibrillation (HCC)    Benign essential hypertension    Chronic anticoagulation    Anxiety    Esophageal ring, acquired    Gastroesophageal reflux disease with esophagitis    Hypercholesterolemia    Hypothyroidism    Osteoporosis    [facility] ([facility]    Third nerve palsy of left eye    Vitamin D deficiency    Chronic tension-type headache, not intractable      Objective:     /90 (BP Location: Right arm, Patient Position: Sitting, Cuff Size: Standard)   Pulse 73   Wt 75 8 kg (167 lb) Comment: With shoes  SpO2 93%   BMI 28 22 kg/m²     Physical Exam   Constitutional: She is oriented to person, place, and time  She appears well-developed and well-nourished  No distress  Eyes: Conjunctivae are normal  Right eye exhibits no discharge  Left eye exhibits no discharge  No scleral icterus  Neck: Neck supple  No JVD present  No thyromegaly present  Cardiovascular: Normal rate, regular rhythm and normal heart sounds  No murmur heard  Pulmonary/Chest: Effort normal and breath sounds normal  No respiratory distress  She has no wheezes   She has no rales  She exhibits no tenderness  Abdominal: Soft  Bowel sounds are normal  She exhibits no distension  There is no tenderness  Musculoskeletal: She exhibits no edema  Lymphadenopathy:     She has no cervical adenopathy  Neurological: She is alert and oriented to person, place, and time  Skin: Skin is warm and dry  She is not diaphoretic  Psychiatric: She has a normal mood and affect  Her behavior is normal    Vitals reviewed      Jessy Cifuentes DO  [facility]

## 2020-02-25 NOTE — PATIENT INSTRUCTIONS
Cholesterol and Your Health   AMBULATORY CARE:   Cholesterol  is a waxy, fat-like substance  Cholesterol is made by your body, but also comes from certain foods you eat  Your body uses cholesterol to make hormones and new cells  Your body also uses cholesterol to protect nerves  Cholesterol comes from foods such as meat and dairy products  Your total cholesterol level is made up by LDL cholesterol, HDL cholesterol, and triglycerides:  · LDL cholesterol  is called bad cholesterol  because it forms plaque in your arteries  As plaque builds up, your arteries become narrow, and less blood flows through  When plaque decreases blood flow to your heart, you may have chest pain  If plaque completely blocks an artery that bring blood to your heart, you may have a heart attack  Plaque can break off and form blood clots  Blood clots may block arteries in your brain and cause a stroke  · HDL cholesterol  is called good cholesterol  because it helps remove LDL cholesterol from your arteries  It does this by attaching to LDL cholesterol and carrying it to your liver  Your liver breaks down LDL cholesterol so your body can get rid of it  High levels of HDL cholesterol can help prevent a heart attack and stroke  Low levels of HDL cholesterol can increase your risk for heart disease, heart attack, and stroke  · Triglycerides  are a type of fat that store energy from foods you eat  High levels of triglycerides also cause plaque buildup  This can increase your risk for a heart attack or stroke  If your triglyceride level is high, your LDL cholesterol level may also be high  How food affects your cholesterol levels:   · Unhealthy fats  increase LDL cholesterol and triglyceride levels in your blood  They are found in foods high in cholesterol, saturated fat, and trans fat:     ¨ Cholesterol  is found in eggs, dairy, and meat  ¨ Saturated fat  is found in butter, cheese, ice cream, whole milk, and coconut oil  Saturated fat is also found in meat, such as sausage, hot dogs, and bologna  ¨ Trans fat  is found in liquid oils and is used in fried and baked foods  Foods that contain trans fats include chips, crackers, muffins, sweet rolls, microwave popcorn, and cookies  · Healthy fats,  also called unsaturated fats, help lower LDL cholesterol and triglyceride levels  Healthy fats include the following:     ¨ Monounsaturated fats  are found in foods such as olive oil, canola oil, avocado, nuts, and olives  ¨ Polyunsaturated fats,  such as omega 3 fats, are found in fish, such as salmon, trout, and tuna  They can also be found in plant foods such as flaxseed, walnuts, and soybeans  Other things that affect your cholesterol levels:   · Smoking cigarettes    · Being overweight or obese     · Drinking large amounts of alcohol    · Not enough exercise or no exercise    · Certain genes passed from your parents to you  What you need to know about having your cholesterol levels checked: Adults 21to 39years of age should have their cholesterol levels checked every 4 to 6 years  Adults 45 years and older should have their cholesterol checked every 1 to 2 years  You may need your cholesterol checked more often, or at a younger age, if you have risk factors for heart disease  You may also need to have your cholesterol checked more often if you have other health conditions, such as diabetes  Blood tests are used to check cholesterol levels  Blood tests measure your levels of triglycerides, LDL cholesterol, and HDL cholesterol  Cholesterol level goals: Your cholesterol level goal may depend on your risk for heart disease  It may also depend on your age and other health conditions  Ask your healthcare provider if the following goals are right for you:  · Your total cholesterol level  should be less than 200 mg/dL  This number may also depend on your HDL and LDL cholesterol goals       · Your LDL cholesterol level  should be less than 130 mg/dL  · Your HDL cholesterol level  should be 60 mg/dL or higher  · Your triglyceride level  should be less than 150 mg/dL  Treatment for high cholesterol:  Treatment for high cholesterol will also decrease your risk of heart disease, heart attack, and stroke  Treatment may include any of the following:  · Medicines  may be given to lower your LDL cholesterol, triglyceride levels, or total cholesterol level  You may need medicines to lower your cholesterol if any of the following is true:     ¨ You have a history of stroke, TIA, unstable angina, or a heart attack    ¨ Your LDL cholesterol level is 190 mg/dL or higher    ¨ You are age 36to 76years of age, have diabetes, and your LDL cholesterol is 70 mg/dL or higher    ¨ You are age 36to 76years of age, have risk factors for heart disease, and your LDL cholesterol is 70 mg/dL or higher    · Lifestyle changes  include changes to your diet, exercise, weight loss, and quitting smoking  It also includes decreasing the amount of alcohol you drink  · Supplements  include fish oil, red yeast rice, and garlic  Fish oil may help lower your triglyceride and LDL cholesterol levels  It may also increase your HDL cholesterol level  Red yeast rice may help decrease your total cholesterol level and LDL cholesterol level  Garlic may help lower your total cholesterol level  Do not take these supplements without talking to your healthcare provider  Nutrition to help lower your cholesterol levels:  A registered dietitian can help you create a healthy eating plan  Read food labels and choose foods low in saturated fat, trans fats, and cholesterol  · Decrease the total amount of fat you eat  Choose lean meats, fat-free or 1% fat milk, and low-fat dairy products, such as yogurt and cheese  Try to limit or avoid red meats  Limit or do not eat fried foods or baked goods such as cookies  · Replace unhealthy fats with healthy fats    Cook foods in olive oil or canola oil  Choose soft margarines that are low in saturated fat and trans fat  Seeds, nuts, and avocados are other examples of healthy fats  · Eat foods with omega-3 fats  Examples include salmon, tuna, mackerel, walnuts, and flaxseed  Eat fish 2 times per week  Children and pregnant women should not eat fish that have high levels of mercury, such as shark, swordfish, and nas mackerel  · Increase the amount of plant-based foods you eat  Plant-based foods are low in cholesterol and fat  Eating more of these foods may help lower your cholesterol and help you lose weight  Examples of plant-based foods includes fruits, vegetables, legumes, and whole grains  Replace milk that contains dairy with almond, soy, or coconut milk  Eat beans and foods with soy for protein instead of meat  Ask your healthcare provider or dietitian for more information on plant-based foods  · Increase the amount of fiber you eat  High-fiber foods can help lower your LDL cholesterol  You should eat between 20 and 30 grams of fiber each day  Eat at least 5 servings of fruits and vegetables each day  Other examples of high-fiber foods include whole-grain or whole-wheat breads, pastas, or cereals, and brown rice  Eat 3 ounces of whole-grain foods each day  Increase fiber slowly  You may have abdominal discomfort, bloating, and gas if you add fiber to your diet too quickly  Lifestyle changes you can make to help lower your cholesterol levels:   · Maintain a healthy weight  Ask your healthcare provider how much you should weigh  Ask him or her to help you create a weight loss plan if you are overweight  Weight loss can decrease your total cholesterol and triglyceride levels  · Exercise regularly  Exercise can help lower your total cholesterol level and maintain a healthy weight  Exercise can also help increase your HDL cholesterol level   Work with your healthcare provider to create an exercise program that is right for you  Get at least 30 minutes of moderate exercise most days of the week  Examples of exercise include brisk walking, swimming, or biking  · Do not smoke  Nicotine and other chemicals in cigarettes and cigars can damage your lungs, heart, and blood vessels  They can also raise your triglyceride levels  Ask your healthcare provider for information if you currently smoke and need help to quit  E-cigarettes or smokeless tobacco still contain nicotine  Talk to your healthcare provider before you use these products  · Limit or do not drink alcohol  Alcohol can increase your triglyceride levels  Ask your healthcare provider if it is safe for you to drink alcohol  Also ask how much is safe for you to drink each day  © 2017 [facility] Information is for End User's use only and may not be sold, redistributed or otherwise used for commercial purposes  All illustrations and images included in CareNotes® are the copyrighted property of [employer]  or Evelio Ja[employer]  The above information is an  only  It is not intended as medical advice for individual conditions or treatments  Talk to your doctor, nurse or pharmacist before following any medical regimen to see if it is safe and effective for you

## 2020-03-08 DIAGNOSIS — E78.5 HYPERLIPIDEMIA, UNSPECIFIED HYPERLIPIDEMIA TYPE: ICD-10-CM

## 2020-03-09 RX ORDER — SIMVASTATIN 20 MG
TABLET ORAL
Qty: 90 TABLET | Refills: 3 | Status: SHIPPED | OUTPATIENT
Start: 2020-03-09 | End: 2021-03-08

## 2020-04-04 DIAGNOSIS — F41.9 ANXIETY: ICD-10-CM

## 2020-04-04 RX ORDER — LORAZEPAM 1 MG/1
TABLET ORAL
Qty: 60 TABLET | Refills: 3 | Status: SHIPPED | OUTPATIENT
Start: 2020-04-04 | End: 2020-08-06

## 2020-04-22 DIAGNOSIS — G44.039 PAROXYSMAL HEMICRANIA: ICD-10-CM

## 2020-04-22 RX ORDER — GABAPENTIN 100 MG/1
CAPSULE ORAL
Qty: 180 CAPSULE | Refills: 0 | Status: SHIPPED | OUTPATIENT
Start: 2020-04-22 | End: 2020-07-17

## 2020-04-23 DIAGNOSIS — I10 ESSENTIAL HYPERTENSION: ICD-10-CM

## 2020-04-23 RX ORDER — METOPROLOL TARTRATE 50 MG/1
TABLET, FILM COATED ORAL
Qty: 180 TABLET | Refills: 0 | Status: SHIPPED | OUTPATIENT
Start: 2020-04-23 | End: 2020-07-17

## 2020-05-01 DIAGNOSIS — I48.91 ATRIAL FIBRILLATION, UNSPECIFIED TYPE (HCC): ICD-10-CM

## 2020-05-01 RX ORDER — RIVAROXABAN 20 MG/1
TABLET, FILM COATED ORAL
Qty: 90 TABLET | Refills: 1 | Status: SHIPPED | OUTPATIENT
Start: 2020-05-01 | End: 2020-10-29

## 2020-06-04 DIAGNOSIS — D35.2 PITUITARY ADENOMA (HCC): ICD-10-CM

## 2020-06-04 RX ORDER — BROMOCRIPTINE MESYLATE 2.5 MG/1
5 TABLET ORAL 2 TIMES DAILY
Qty: 360 TABLET | Refills: 2 | Status: SHIPPED | OUTPATIENT
Start: 2020-06-04 | End: 2020-07-17 | Stop reason: ALTCHOICE

## 2020-06-11 ENCOUNTER — APPOINTMENT (OUTPATIENT)
Dept: LAB | Facility: HOSPITAL | Age: 75
End: 2020-06-11
Attending: NEUROLOGICAL SURGERY
Payer: MEDICARE

## 2020-06-11 DIAGNOSIS — D35.2 PITUITARY ADENOMA (HCC): ICD-10-CM

## 2020-06-11 DIAGNOSIS — I10 BENIGN ESSENTIAL HYPERTENSION: Chronic | ICD-10-CM

## 2020-06-11 LAB
BUN SERPL-MCNC: 15 MG/DL (ref 5–25)
CREAT SERPL-MCNC: 1.06 MG/DL (ref 0.6–1.3)
GFR SERPL CREATININE-BSD FRML MDRD: 52 ML/MIN/1.73SQ M
PROLACTIN SERPL-MCNC: 78.5 NG/ML

## 2020-06-11 PROCEDURE — 36415 COLL VENOUS BLD VENIPUNCTURE: CPT

## 2020-06-11 PROCEDURE — 82565 ASSAY OF CREATININE: CPT

## 2020-06-11 PROCEDURE — 84146 ASSAY OF PROLACTIN: CPT

## 2020-06-11 PROCEDURE — 84520 ASSAY OF UREA NITROGEN: CPT

## 2020-06-17 ENCOUNTER — HOSPITAL ENCOUNTER (OUTPATIENT)
Dept: MRI IMAGING | Facility: CLINIC | Age: 75
Discharge: HOME | End: 2020-06-17
Attending: NEUROLOGICAL SURGERY
Payer: MEDICARE

## 2020-06-17 DIAGNOSIS — D35.2 PITUITARY ADENOMA (HCC): ICD-10-CM

## 2020-06-17 PROCEDURE — A9585 GADOBUTROL INJECTION: HCPCS | Performed by: NEUROLOGICAL SURGERY

## 2020-06-17 PROCEDURE — 70553 MRI BRAIN STEM W/O & W/DYE: CPT

## 2020-06-17 RX ADMIN — GADOBUTROL 7.5 ML: 604.72 INJECTION INTRAVENOUS at 13:39

## 2020-06-18 ENCOUNTER — TELEPHONE (OUTPATIENT)
Dept: NEUROSURGERY | Facility: CLINIC | Age: 75
End: 2020-06-18

## 2020-06-22 ENCOUNTER — TELEPHONE (OUTPATIENT)
Dept: NEUROSURGERY | Facility: CLINIC | Age: 75
End: 2020-06-22

## 2020-06-25 ENCOUNTER — OFFICE VISIT (OUTPATIENT)
Dept: NEUROSURGERY | Facility: CLINIC | Age: 75
End: 2020-06-25
Payer: MEDICARE

## 2020-06-25 VITALS
BODY MASS INDEX: 27.32 KG/M2 | TEMPERATURE: 96.9 F | HEART RATE: 66 BPM | WEIGHT: 164 LBS | RESPIRATION RATE: 16 BRPM | HEIGHT: 65 IN

## 2020-06-25 DIAGNOSIS — D35.2 PROLACTINOMA (HCC): ICD-10-CM

## 2020-06-25 DIAGNOSIS — D35.2 PITUITARY ADENOMA (HCC): Primary | Chronic | ICD-10-CM

## 2020-06-25 PROCEDURE — 1160F RVW MEDS BY RX/DR IN RCRD: CPT | Performed by: PHYSICIAN ASSISTANT

## 2020-06-25 PROCEDURE — 99213 OFFICE O/P EST LOW 20 MIN: CPT | Performed by: PHYSICIAN ASSISTANT

## 2020-06-25 PROCEDURE — 3074F SYST BP LT 130 MM HG: CPT | Performed by: PHYSICIAN ASSISTANT

## 2020-06-25 PROCEDURE — 3079F DIAST BP 80-89 MM HG: CPT | Performed by: PHYSICIAN ASSISTANT

## 2020-06-25 PROCEDURE — 3008F BODY MASS INDEX DOCD: CPT | Performed by: PHYSICIAN ASSISTANT

## 2020-06-25 PROCEDURE — 4040F PNEUMOC VAC/ADMIN/RCVD: CPT | Performed by: PHYSICIAN ASSISTANT

## 2020-06-25 PROCEDURE — 1036F TOBACCO NON-USER: CPT | Performed by: PHYSICIAN ASSISTANT

## 2020-07-08 ENCOUNTER — OFFICE VISIT (OUTPATIENT)
Dept: NEUROLOGY | Facility: CLINIC | Age: 75
End: 2020-07-08
Payer: MEDICARE

## 2020-07-08 VITALS
HEART RATE: 77 BPM | HEIGHT: 65 IN | TEMPERATURE: 97.8 F | WEIGHT: 160 LBS | BODY MASS INDEX: 26.66 KG/M2 | DIASTOLIC BLOOD PRESSURE: 78 MMHG | SYSTOLIC BLOOD PRESSURE: 148 MMHG

## 2020-07-08 DIAGNOSIS — D35.2 PITUITARY ADENOMA (HCC): Primary | Chronic | ICD-10-CM

## 2020-07-08 DIAGNOSIS — G44.229 CHRONIC TENSION-TYPE HEADACHE, NOT INTRACTABLE: ICD-10-CM

## 2020-07-08 PROCEDURE — 99214 OFFICE O/P EST MOD 30 MIN: CPT | Performed by: PSYCHIATRY & NEUROLOGY

## 2020-07-08 PROCEDURE — 1036F TOBACCO NON-USER: CPT | Performed by: PSYCHIATRY & NEUROLOGY

## 2020-07-08 PROCEDURE — 3077F SYST BP >= 140 MM HG: CPT | Performed by: PSYCHIATRY & NEUROLOGY

## 2020-07-08 PROCEDURE — 3078F DIAST BP <80 MM HG: CPT | Performed by: PSYCHIATRY & NEUROLOGY

## 2020-07-08 PROCEDURE — 4040F PNEUMOC VAC/ADMIN/RCVD: CPT | Performed by: PSYCHIATRY & NEUROLOGY

## 2020-07-08 PROCEDURE — 3008F BODY MASS INDEX DOCD: CPT | Performed by: PSYCHIATRY & NEUROLOGY

## 2020-07-08 PROCEDURE — 1160F RVW MEDS BY RX/DR IN RCRD: CPT | Performed by: PSYCHIATRY & NEUROLOGY

## 2020-07-08 RX ORDER — BUTALBITAL, ACETAMINOPHEN AND CAFFEINE 50; 325; 40 MG/1; MG/1; MG/1
1 TABLET ORAL 2 TIMES DAILY PRN
Qty: 60 TABLET | Refills: 5 | Status: SHIPPED | OUTPATIENT
Start: 2020-07-08 | End: 2021-01-07

## 2020-07-08 NOTE — PROGRESS NOTES
Progress Note - Neurology   Landy Perla 69 y o  female MRN: [id number]  Unit/Bed#:  Encounter: [id number]      Subjective:   Patient is here for a follow-up visit and suffers from hyperprolactinemia secondary to pituitary adenoma, status post resection, and recently had a follow-up prolactin level as well as an MRI of the brain subsequently seen by Neurosurgery was noted to have increase in the prolactin level from 23 to 78 and advised switching to Cabergoline  MRI of the brain showed stable changes in the pituitary tumor with no evident enlargement  Patient gets occasional headaches with changes in the barometric pressure and denies any visual dysfunction or diplopia  She denies any new neurological symptoms  Also describes intermittent dependence swelling in the foot and leg  ROS:   Review of Systems   Constitutional: Negative  Negative for appetite change and fever  HENT: Negative  Negative for hearing loss, tinnitus, trouble swallowing and voice change  Eyes: Negative  Negative for photophobia and pain  Respiratory: Negative  Negative for shortness of breath  Cardiovascular: Negative  Negative for palpitations  Gastrointestinal: Negative  Negative for nausea and vomiting  Endocrine: Negative  Negative for cold intolerance  Genitourinary: Negative  Negative for dysuria, frequency and urgency  Musculoskeletal: Negative  Negative for myalgias and neck pain  Skin: Negative  Negative for rash  Neurological: Negative  Negative for dizziness, tremors, seizures, syncope, facial asymmetry, speech difficulty, weakness, light-headedness, numbness and headaches  Left ankle swelling   Hematological: Negative  Does not bruise/bleed easily  Psychiatric/Behavioral: Negative  Negative for confusion, hallucinations and sleep disturbance  MA review of systems was reviewed by myself      Vitals:   Vitals:    07/08/20 1250   BP: 148/78   BP Location: Right arm   Patient Position: Sitting   Cuff Size: Standard   Pulse: 77   Temp: 97 8 °F (36 6 °C)   Weight: 72 6 kg (160 lb)   Height: 5' 4 5" (1 638 m)   ,Body mass index is 27 04 kg/m²      MEDS:      Current Outpatient Medications:     amoxicillin (AMOXIL) 500 MG tablet, Take 500 mg by mouth 4 tablets 1 hour before dental procedure, Disp: , Rfl:     bromocriptine (PARLODEL) 2 5 mg tablet, TAKE 2 TABLETS (5 MG TOTAL) BY MOUTH 2 (TWO) TIMES A DAY, Disp: 360 tablet, Rfl: 2    butalbital-acetaminophen-caffeine (FIORICET,ESGIC) -40 mg per tablet, Take 1 tablet by mouth 2 (two) times a day as needed for headaches (headache), Disp: 60 tablet, Rfl: 5    Cholecalciferol (VITAMIN D) 2000 units CAPS, Take 1 tablet by mouth daily, Disp: , Rfl:     levothyroxine 50 mcg tablet, Take 1 tablet (50 mcg total) by mouth daily, Disp: 90 tablet, Rfl: 3    lisinopril (ZESTRIL) 5 mg tablet, TAKE 1 TABLET BY MOUTH EVERY DAY, Disp: 90 tablet, Rfl: 3    LORazepam (ATIVAN) 1 mg tablet, TAKE1 TABLET BY MOUTH 2 TIMES A DAY AS NEEDED FOR ANXIETY, Disp: 60 tablet, Rfl: 3    metoprolol tartrate (LOPRESSOR) 50 mg tablet, TAKE 1 TABLET BY MOUTH TWICE A DAY, Disp: 180 tablet, Rfl: 0    midodrine (PROAMATINE) 2 5 mg tablet, Take 1 tablet (2 5 mg total) by mouth 3 (three) times a day, Disp: 270 tablet, Rfl: 3    pantoprazole (PROTONIX) 40 mg tablet, Take 1 tablet (40 mg total) by mouth daily before breakfast, Disp: 90 tablet, Rfl: 3    simvastatin (ZOCOR) 20 mg tablet, TAKE 1 TABLET DAILY AT BEDTIME , Disp: 90 tablet, Rfl: 3    XARELTO 20 MG tablet, TAKE 1 TABLET BY MOUTH EVERY DAY, Disp: 90 tablet, Rfl: 1    gabapentin (NEURONTIN) 100 mg capsule, TAKE 1 CAPSULE BY MOUTH TWICE A DAY (Patient not taking: Reported on 7/8/2020), Disp: 180 capsule, Rfl: 0  :    Physical Exam:  General appearance: alert, appears stated age and cooperative  Head: Normocephalic, without obvious abnormality, atraumatic    On examination patient is alert awake oriented, speech is fluent, cranial nerves 2-12 intact with no evidence of any field cut, extraocular movement weakness, ptosis and on motor and sensory exam there are no new deficits noted in the upper lower extremities except for limited left arm and forearm movement due to prosthesis  No dysmetria was noted and her gait is normal based  Patient has pedal edema in the left ankle  Lab Results: I have personally reviewed pertinent reports  Imaging Studies: I have personally reviewed pertinent reports  Assessment:  1  Pituitary adenoma  2  Hyperprolactinemia    Plan:  Patient is advised a repeat prolactin level to compared to her level 3 weeks ago and if her level continues to rise will consider switching her to Cabergoline at this time, otherwise will continue bromocriptine at the same dose with close monitoring  She does not wish to seek endocrinology appointment at this time and patient is advised to call us if she notices any worsening symptoms  She will return back to see me in 3 months  7/8/2020,12:53 PM    Dictation voice to text software has been used in the creation of this document  Please consider this in light of any contextual or grammatical errors

## 2020-07-09 ENCOUNTER — APPOINTMENT (OUTPATIENT)
Dept: LAB | Facility: HOSPITAL | Age: 75
End: 2020-07-09
Attending: PSYCHIATRY & NEUROLOGY
Payer: MEDICARE

## 2020-07-09 DIAGNOSIS — D35.2 PITUITARY ADENOMA (HCC): Chronic | ICD-10-CM

## 2020-07-09 LAB — PROLACTIN SERPL-MCNC: 60.1 NG/ML

## 2020-07-09 PROCEDURE — 36415 COLL VENOUS BLD VENIPUNCTURE: CPT

## 2020-07-09 PROCEDURE — 84146 ASSAY OF PROLACTIN: CPT

## 2020-07-10 DIAGNOSIS — D35.2 PITUITARY ADENOMA (HCC): Primary | ICD-10-CM

## 2020-07-16 ENCOUNTER — APPOINTMENT (OUTPATIENT)
Dept: LAB | Facility: HOSPITAL | Age: 75
End: 2020-07-16
Attending: PSYCHIATRY & NEUROLOGY
Payer: MEDICARE

## 2020-07-16 DIAGNOSIS — D35.2 PITUITARY ADENOMA (HCC): ICD-10-CM

## 2020-07-16 LAB — PROLACTIN SERPL-MCNC: 88.4 NG/ML

## 2020-07-16 PROCEDURE — 36415 COLL VENOUS BLD VENIPUNCTURE: CPT

## 2020-07-16 PROCEDURE — 84146 ASSAY OF PROLACTIN: CPT

## 2020-07-17 ENCOUNTER — TELEPHONE (OUTPATIENT)
Dept: NEUROLOGY | Facility: CLINIC | Age: 75
End: 2020-07-17

## 2020-07-17 DIAGNOSIS — D35.2 PITUITARY ADENOMA (HCC): Primary | ICD-10-CM

## 2020-07-17 DIAGNOSIS — I10 ESSENTIAL HYPERTENSION: ICD-10-CM

## 2020-07-17 DIAGNOSIS — G44.039 PAROXYSMAL HEMICRANIA: ICD-10-CM

## 2020-07-17 RX ORDER — GABAPENTIN 100 MG/1
CAPSULE ORAL
Qty: 180 CAPSULE | Refills: 0 | Status: SHIPPED | OUTPATIENT
Start: 2020-07-17 | End: 2020-09-24 | Stop reason: ALTCHOICE

## 2020-07-17 RX ORDER — CABERGOLINE 0.5 MG/1
0.5 TABLET ORAL 2 TIMES WEEKLY
Qty: 60 TABLET | Refills: 1 | Status: SHIPPED | OUTPATIENT
Start: 2020-07-20 | End: 2020-08-03

## 2020-07-17 RX ORDER — METOPROLOL TARTRATE 50 MG/1
TABLET, FILM COATED ORAL
Qty: 180 TABLET | Refills: 0 | Status: SHIPPED | OUTPATIENT
Start: 2020-07-17 | End: 2020-10-08

## 2020-07-17 NOTE — TELEPHONE ENCOUNTER
Called patient and advised about prolactin level increasing again in spite of her taking bromocriptine, patient is advised to switch to Cabergoline 0 5 mg twice a day, and will repeat prolactin level in 2 weeks    If there is no improvement will get a follow-up MRI near future

## 2020-07-28 DIAGNOSIS — I95.1 ORTHOSTATIC HYPOTENSION: ICD-10-CM

## 2020-07-28 RX ORDER — MIDODRINE HYDROCHLORIDE 2.5 MG/1
2.5 TABLET ORAL 3 TIMES DAILY
Qty: 270 TABLET | Refills: 3 | Status: SHIPPED | OUTPATIENT
Start: 2020-07-28 | End: 2021-09-16

## 2020-07-30 ENCOUNTER — APPOINTMENT (OUTPATIENT)
Dept: LAB | Facility: HOSPITAL | Age: 75
End: 2020-07-30
Attending: PSYCHIATRY & NEUROLOGY
Payer: MEDICARE

## 2020-07-30 DIAGNOSIS — D35.2 PITUITARY ADENOMA (HCC): ICD-10-CM

## 2020-07-30 LAB — PROLACTIN SERPL-MCNC: 168.2 NG/ML

## 2020-07-30 PROCEDURE — 36415 COLL VENOUS BLD VENIPUNCTURE: CPT

## 2020-07-30 PROCEDURE — 84146 ASSAY OF PROLACTIN: CPT

## 2020-08-03 DIAGNOSIS — M79.89 LEFT LEG SWELLING: Primary | ICD-10-CM

## 2020-08-03 DIAGNOSIS — D35.2 PITUITARY ADENOMA (HCC): ICD-10-CM

## 2020-08-03 RX ORDER — CABERGOLINE 0.5 MG/1
0.5 TABLET ORAL DAILY
Qty: 60 TABLET | Refills: 1 | Status: SHIPPED | OUTPATIENT
Start: 2020-08-03 | End: 2020-12-10

## 2020-08-04 ENCOUNTER — HOSPITAL ENCOUNTER (OUTPATIENT)
Dept: NON INVASIVE DIAGNOSTICS | Facility: CLINIC | Age: 75
Discharge: HOME | End: 2020-08-04
Payer: MEDICARE

## 2020-08-04 DIAGNOSIS — M79.89 LEFT LEG SWELLING: ICD-10-CM

## 2020-08-04 DIAGNOSIS — R59.0 PELVIC LYMPHADENOPATHY: Primary | ICD-10-CM

## 2020-08-04 PROCEDURE — 93971 EXTREMITY STUDY: CPT

## 2020-08-04 PROCEDURE — 93971 EXTREMITY STUDY: CPT | Performed by: SURGERY

## 2020-08-06 DIAGNOSIS — F41.9 ANXIETY: ICD-10-CM

## 2020-08-06 RX ORDER — LORAZEPAM 1 MG/1
TABLET ORAL
Qty: 60 TABLET | Refills: 3 | Status: SHIPPED | OUTPATIENT
Start: 2020-08-06 | End: 2020-11-30

## 2020-08-06 NOTE — TELEPHONE ENCOUNTER
Controlled Substance Review    [place] PDMP or [place]  reviewed: No red flags were identified; safe to proceed with prescription      PDMP Review       Value Time User    PDMP Reviewed  Yes 8/6/2020 11:27 AM Pradip Elaine DO

## 2020-08-12 ENCOUNTER — TRANSCRIBE ORDERS (OUTPATIENT)
Dept: ADMINISTRATIVE | Facility: HOSPITAL | Age: 75
End: 2020-08-12

## 2020-08-12 ENCOUNTER — APPOINTMENT (OUTPATIENT)
Dept: LAB | Facility: HOSPITAL | Age: 75
End: 2020-08-12
Attending: PSYCHIATRY & NEUROLOGY
Payer: MEDICARE

## 2020-08-12 DIAGNOSIS — D35.2 PITUITARY ADENOMA (HCC): ICD-10-CM

## 2020-08-12 LAB — PROLACTIN SERPL-MCNC: 46.4 NG/ML

## 2020-08-12 PROCEDURE — 84146 ASSAY OF PROLACTIN: CPT

## 2020-08-12 PROCEDURE — 36415 COLL VENOUS BLD VENIPUNCTURE: CPT

## 2020-08-24 ENCOUNTER — HOSPITAL ENCOUNTER (OUTPATIENT)
Dept: CT IMAGING | Facility: HOSPITAL | Age: 75
Discharge: HOME | End: 2020-08-24
Attending: PSYCHIATRY & NEUROLOGY
Payer: MEDICARE

## 2020-08-24 ENCOUNTER — TELEPHONE (OUTPATIENT)
Dept: INTERNAL MEDICINE CLINIC | Facility: CLINIC | Age: 75
End: 2020-08-24

## 2020-08-24 DIAGNOSIS — R59.0 PELVIC LYMPHADENOPATHY: ICD-10-CM

## 2020-08-24 PROCEDURE — 72193 CT PELVIS W/DYE: CPT

## 2020-08-24 PROCEDURE — G1004 CDSM NDSC: HCPCS

## 2020-08-24 RX ADMIN — IOHEXOL 100 ML: 350 INJECTION, SOLUTION INTRAVENOUS at 13:26

## 2020-08-24 NOTE — PROGRESS NOTES
Assessment and Plan:     1  Medicare annual wellness visit, subsequent       Preventive health issues were discussed with patient, and age appropriate screening tests were ordered as noted in patient's After Visit Summary  Personalized health advice and appropriate referrals for health education or preventive services given if needed, as noted in patient's After Visit Summary  Patient refuses CRC screening, breast cancer screening  Prior history of osteopenia and does not want repeat DXA  She states she would refuse osteoporosis medications anyways       History of Present Illness:     Patient presents for Medicare Annual Wellness visit    Patient Care Team:  Bernadine Loya DO as PCP - General (Internal Medicine)  MD Cruz Mendez PA-C Anna Killer, MD     Problem List:     Patient Active Problem List   Diagnosis    Atrial fibrillation ([facility] )    Benign essential hypertension    Chronic anticoagulation    Anxiety    Esophageal ring, acquired    Gastroesophageal reflux disease with esophagitis    Hypercholesterolemia    Hypothyroidism    Osteoporosis    Pituitary adenoma ([facility] )    Prolactinoma ([facility] )    Third nerve palsy of left eye    Vitamin D deficiency    Chronic tension-type headache, not intractable      Past Medical and Surgical History:     Past Medical History:   Diagnosis Date    A-fib ([place] [id number] )     Abnormal brain MRI     Anemia     last assessed: 12/14/2015    Ankle fracture     Anxiety     last assessed: 11/14/2017    Atrial fibrillation ([facility] )     last assessed: 1/18/2015    Blepharoptosis     Brain mass     Cataract     Colon polyps     Depression     Disease of thyroid gland     Dysphagia     Elevated prolactin level     Esophageal perforation     Fracture, shoulder     GERD (gastroesophageal reflux disease)     Headache     Hypercholesterolemia     Hyperlipidemia     Hyperprolactinemia (HCC)     Hypertension     Hypotension     Hypothyroidism     last assessed: 11/20/2017    Left heart failure (HCC)     Loss of smell     Lumbago with sciatica     Macroadenoma     last assessed: 7/23/2015    Migraine     Obesity     Osteoporosis     Pituitary adenoma [facility] )     last assessed: 11/20/2017    Pituitary tumor     Prolactinoma ([facility]     Renal failure     Syncope     Third nerve palsy     Unstable balance     Vitamin D deficiency     Wrist fracture      Past Surgical History:   Procedure Laterality Date    APPENDECTOMY      CATARACT EXTRACTION      CORONARY ANGIOPLASTY WITH STENT PLACEMENT  01/2016    DILATION AND CURETTAGE OF UTERUS      ELBOW SURGERY Left     replacement    ESOPHAGOGASTRODUODENOSCOPY  08/29/2012    diagnostic    FIXATION KYPHOPLASTY      HYSTERECTOMY      [place] ESOPHAGOGASTRODUODENOSCOPY TRANSORAL DIAGNOSTIC N/A 1/25/2018    Procedure: ESOPHAGOGASTRODUODENOSCOPY (EGD); Surgeon: Marielos Ziegler MD;  Location: [facility];   Service: Gastroenterology    TONSILLECTOMY      TOTAL ABDOMINAL HYSTERECTOMY W/ BILATERAL SALPINGOOPHORECTOMY      TOTAL ELBOW REPLACEMENT  02/07/2019    VERTEBRAL AUGMENTATION      percutaneous vertebral augmentation kyphoplasty      Family History:     Family History   Problem Relation Age of Onset    Osteoporosis Mother     Other Mother         sepsis    Other Father         cerebellar hemorrhage; hemorrhage in brain    Hypertension Father     Thyroid disease Sister         thyroid disorder    Colon cancer Maternal Grandmother     Heart attack Maternal Grandfather         acute myocardial infarction    Stomach cancer Paternal Grandmother     Other Paternal Grandfather         urinary retention    Stroke Paternal Uncle         syndrome      Social History:     E-Cigarette/Vaping    E-Cigarette Use Never User      E-Cigarette/Vaping Substances    Nicotine No     THC No     CBD No     Flavoring No     Other No     Unknown No      Social History     Socioeconomic History    Marital status:      Spouse name: None    Number of children: None    Years of education: completed technical school    Highest education level: None   Occupational History    Occupation: Xray tech     Comment: retired   Social Needs    Financial resource strain: None    Food insecurity     Worry: None     Inability: None    Transportation needs     Medical: None     Non-medical: None   Tobacco Use    Smoking status: Never Smoker    Smokeless tobacco: Never Used   Substance and Sexual Activity    Alcohol use: Yes     Frequency: 2-4 times a month     Drinks per session: 1 or 2     Binge frequency: Never     Comment: social; drinks wine    Drug use: No    Sexual activity: Not Currently   Lifestyle    Physical activity     Days per week: 7 days     Minutes per session: 60 min    Stress:  Only a little   Relationships    Social connections     Talks on phone: None     Gets together: None     Attends [employer] service: None     Active member of club or organization: None     Attends meetings of clubs or organizations: None     Relationship status: None    Intimate partner violence     Fear of current or ex partner: None     Emotionally abused: None     Physically abused: None     Forced sexual activity: None   Other Topics Concern    None   Social History Narrative    Functioning activity level-participates in moderate activities both inside and outside of the home    Lives independently      Medications and Allergies:     Current Outpatient Medications   Medication Sig Dispense Refill    amoxicillin (AMOXIL) 500 MG tablet Take 500 mg by mouth 4 tablets 1 hour before dental procedure      butalbital-acetaminophen-caffeine (FIORICET,ESGIC) -40 mg per tablet Take 1 tablet by mouth 2 (two) times a day as needed for headaches (headache) 60 tablet 5    Cholecalciferol (VITAMIN D) 2000 units CAPS Take 1 tablet by mouth daily      levothyroxine 50 mcg tablet Take 1 tablet (50 mcg total) by mouth daily 90 tablet 3    lisinopril (ZESTRIL) 5 mg tablet TAKE 1 TABLET BY MOUTH EVERY DAY 90 tablet 3    LORazepam (ATIVAN) 1 mg tablet TAKE1 TABLET BY MOUTH 2 TIMES A DAY AS NEEDED FOR ANXIETY 60 tablet 3    metoprolol tartrate (LOPRESSOR) 50 mg tablet TAKE 1 TABLET BY MOUTH TWICE A  tablet 0    midodrine (PROAMATINE) 2 5 mg tablet TAKE 1 TABLET (2 5 MG TOTAL) BY MOUTH 3 (THREE) TIMES A  tablet 3    pantoprazole (PROTONIX) 40 mg tablet Take 1 tablet (40 mg total) by mouth daily before breakfast 90 tablet 3    simvastatin (ZOCOR) 20 mg tablet TAKE 1 TABLET DAILY AT BEDTIME  90 tablet 3    XARELTO 20 MG tablet TAKE 1 TABLET BY MOUTH EVERY DAY 90 tablet 1    cabergoline (DOSTINEX) 0 5 MG tablet Take 1 tablet (0 5 mg total) by mouth daily 60 tablet 1    gabapentin (NEURONTIN) 100 mg capsule TAKE 1 CAPSULE BY MOUTH TWICE A DAY (Patient not taking: Reported on 8/24/2020) 180 capsule 0     No current facility-administered medications for this visit  Allergies   Allergen Reactions    Cephalosporins Hives    Keflex [Cephalexin] Hives      Immunizations:     Immunization History   Administered Date(s) Administered    INFLUENZA 12/14/2015, 10/18/2016, 09/24/2018    Influenza Split High Dose Preservative Free IM 12/14/2015, 09/24/2018    Influenza TIV (IM) 10/16/2008, 10/18/2016, 10/20/2017    Influenza, high dose seasonal 0 7 mL 09/17/2019    Pneumococcal Conjugate 13-Valent 01/06/2016, 05/16/2017    Pneumococcal Polysaccharide PPV23 04/21/2014, 12/20/2016      Health Maintenance:         Topic Date Due    MAMMOGRAM  05/29/2015    Hepatitis C Screening  Completed         Topic Date Due    DTaP,Tdap,and Td Vaccines (1 - Tdap) 09[birth year]/1967    Influenza Vaccine  07/01/2020      Medicare Health Risk Assessment:     There were no vitals taken for this visit  Marjan Addison is here for her Subsequent Wellness visit   Last Medicare Wellness visit information reviewed, patient interviewed, no change since last AWV  Health Risk Assessment:   Patient rates overall health as good  Patient feels that their physical health rating is same  Eyesight was rated as same  Hearing was rated as same  Patient feels that their emotional and mental health rating is same  Pain experienced in the last 7 days has been none  Patient states that she has experienced weight loss or gain in last 6 months  Depression Screening:   PHQ-2 Score: 0      Fall Risk Screening: In the past year, patient has experienced: no history of falling in past year      Urinary Incontinence Screening:   Patient has not leaked urine accidently in the last six months  Home Safety:  Patient does not have trouble with stairs inside or outside of their home  Patient has working smoke alarms and has no working carbon monoxide detector  Home safety hazards include: none  Nutrition:   Current diet is Regular  Medications:   Patient is currently taking over-the-counter supplements  OTC medications include: see medication list  Patient is able to manage medications  Activities of Daily Living (ADLs)/Instrumental Activities of Daily Living (IADLs):   Walk and transfer into and out of bed and chair?: Yes  Dress and groom yourself?: Yes    Bathe or shower yourself?: Yes    Feed yourself? Yes  Do your laundry/housekeeping?: Yes  Manage your money, pay your bills and track your expenses?: Yes  Make your own meals?: Yes    Do your own shopping?: Yes    Durable Medical Equipment Suppliers  none    Previous Hospitalizations:   Any hospitalizations or ED visits within the last 12 months?: No    How many hospitalizations have you had in the last year?: 1-2    Advance Care Planning:   Living will: Yes    Durable POA for healthcare:  Yes    Advanced directive: Yes    Five wishes given: Yes      Cognitive Screening:   Provider or family/friend/caregiver concerned regarding cognition?: No    PREVENTIVE SCREENINGS Cardiovascular Screening:    General: Screening Not Indicated and History Lipid Disorder      Diabetes Screening:     General: Screening Current      Colorectal Cancer Screening:     General: Patient Declines      Breast Cancer Screening:     General: Patient Declines      Cervical Cancer Screening:    General: Screening Not Indicated      Osteoporosis Screening:    General: Screening Not Indicated and History Osteoporosis      Abdominal Aortic Aneurysm (AAA) Screening:        General: Screening Not Indicated      Lung Cancer Screening:     General: Screening Not Indicated      Hepatitis C Screening:    General: Screening Current    Other Counseling Topics:   Car/seat belt/driving safety, skin self-exam, sunscreen and regular weightbearing exercise         Zainab Melendez DO

## 2020-08-25 ENCOUNTER — OFFICE VISIT (OUTPATIENT)
Dept: INTERNAL MEDICINE CLINIC | Facility: CLINIC | Age: 75
End: 2020-08-25
Payer: MEDICARE

## 2020-08-25 ENCOUNTER — TELEPHONE (OUTPATIENT)
Dept: NEUROLOGY | Facility: CLINIC | Age: 75
End: 2020-08-25

## 2020-08-25 ENCOUNTER — TELEPHONE (OUTPATIENT)
Dept: INTERNAL MEDICINE CLINIC | Facility: CLINIC | Age: 75
End: 2020-08-25

## 2020-08-25 VITALS
BODY MASS INDEX: 26.23 KG/M2 | OXYGEN SATURATION: 97 % | WEIGHT: 155.2 LBS | DIASTOLIC BLOOD PRESSURE: 72 MMHG | TEMPERATURE: 97.2 F | SYSTOLIC BLOOD PRESSURE: 122 MMHG | HEART RATE: 87 BPM

## 2020-08-25 DIAGNOSIS — E78.00 HYPERCHOLESTEROLEMIA: Chronic | ICD-10-CM

## 2020-08-25 DIAGNOSIS — I48.21 PERMANENT ATRIAL FIBRILLATION (HCC): ICD-10-CM

## 2020-08-25 DIAGNOSIS — E03.9 ACQUIRED HYPOTHYROIDISM: Chronic | ICD-10-CM

## 2020-08-25 DIAGNOSIS — I10 BENIGN ESSENTIAL HYPERTENSION: Chronic | ICD-10-CM

## 2020-08-25 DIAGNOSIS — M85.89 OSTEOPENIA OF MULTIPLE SITES: Primary | Chronic | ICD-10-CM

## 2020-08-25 DIAGNOSIS — Z00.00 MEDICARE ANNUAL WELLNESS VISIT, SUBSEQUENT: ICD-10-CM

## 2020-08-25 PROCEDURE — 4040F PNEUMOC VAC/ADMIN/RCVD: CPT | Performed by: INTERNAL MEDICINE

## 2020-08-25 PROCEDURE — G0439 PPPS, SUBSEQ VISIT: HCPCS | Performed by: INTERNAL MEDICINE

## 2020-08-25 PROCEDURE — 3078F DIAST BP <80 MM HG: CPT | Performed by: INTERNAL MEDICINE

## 2020-08-25 PROCEDURE — 1125F AMNT PAIN NOTED PAIN PRSNT: CPT | Performed by: INTERNAL MEDICINE

## 2020-08-25 PROCEDURE — 99214 OFFICE O/P EST MOD 30 MIN: CPT | Performed by: INTERNAL MEDICINE

## 2020-08-25 PROCEDURE — 1160F RVW MEDS BY RX/DR IN RCRD: CPT | Performed by: INTERNAL MEDICINE

## 2020-08-25 PROCEDURE — 1036F TOBACCO NON-USER: CPT | Performed by: INTERNAL MEDICINE

## 2020-08-25 PROCEDURE — 3074F SYST BP LT 130 MM HG: CPT | Performed by: INTERNAL MEDICINE

## 2020-08-25 PROCEDURE — 1170F FXNL STATUS ASSESSED: CPT | Performed by: INTERNAL MEDICINE

## 2020-08-25 NOTE — PROGRESS NOTES
[facility] [address] [place] [address]    NAME: Bianca Novak  AGE: 69 y o  SEX: female  : 1945     DATE: 2020     Assessment and Plan:     1  Osteopenia of multiple sites    Noted on previous DXA scan  Weight bearing exercises and vitamin D/calcium intake discussed  She refuses additional DXA scans  2  Benign essential hypertension    Blood pressure is controlled in the office today  Continue current antihypertensives  3  Hypercholesterolemia    Continue statin as prescribed  Will check lipid panel before next appointment  - Lipid panel; Future  - Comprehensive metabolic panel; Future    4  Acquired hypothyroidism    Continue levothyroxine as prescribed  Will check TSH before next appointment     - TSH, 3rd generation; Future    5  Permanent atrial fibrillation (HCC)    Currently rate controlled  Continue Lopressor and Xarelto  [id number] score is 3 which corresponds to 3 2% annual risk of stroke  Return in about 6 months (around 2021) for Follow-up  Chief Complaint:     Chief Complaint   Patient presents with    Medicare Wellness Visit    Follow-up     6 month        History of Present Illness:     Patient presents for routine follow-up  She denies any changes to her health  Replete had changes to her medications switching from bromocriptine to cabergoline due to rising prolactin levels  Her prolactin levels are now decreasing  She denies any recent falls  Taking cardiovascular medications as prescribed  She denies any recent chest pain, palpitations, shortness of breath  Review of Systems:     Review of Systems   Constitutional: Negative for activity change, appetite change and fatigue  Respiratory: Negative for apnea, cough, chest tightness, shortness of breath and wheezing  Cardiovascular: Negative for chest pain, palpitations and leg swelling     Gastrointestinal: Negative for abdominal distention, abdominal pain, blood in stool, constipation, diarrhea, nausea and vomiting  Musculoskeletal: Negative for arthralgias, back pain, gait problem, joint swelling and myalgias  Skin: Negative for rash and wound  Neurological: Negative for dizziness, weakness, light-headedness, numbness and headaches  Psychiatric/Behavioral: Negative for behavioral problems, confusion, hallucinations, sleep disturbance and suicidal ideas  The patient is not nervous/anxious  Objective:     /72 (BP Location: Left arm, Patient Position: Sitting, Cuff Size: Standard)   Pulse 87   Temp (!) 97 2 °F (36 2 °C) (Temporal) Comment: NO NSAIDS  Wt 70 4 kg (155 lb 3 2 oz) Comment: w/ shoes denied of  SpO2 97%   BMI 26 23 kg/m²     Physical Exam  Vitals signs reviewed  Constitutional:       General: She is not in acute distress  Appearance: She is well-developed  She is not diaphoretic  Eyes:      General: No scleral icterus  Right eye: No discharge  Left eye: No discharge  Conjunctiva/sclera: Conjunctivae normal    Neck:      Musculoskeletal: Neck supple  Thyroid: No thyromegaly  Vascular: No JVD  Cardiovascular:      Rate and Rhythm: Normal rate  Rhythm irregularly irregular  Heart sounds: Normal heart sounds  No murmur  Pulmonary:      Effort: Pulmonary effort is normal  No respiratory distress  Breath sounds: Normal breath sounds  No wheezing or rales  Abdominal:      General: Bowel sounds are normal  There is no distension  Palpations: Abdomen is soft  Tenderness: There is no abdominal tenderness  Musculoskeletal:      Right lower leg: No edema  Left lower leg: No edema  Lymphadenopathy:      Cervical: No cervical adenopathy  Skin:     General: Skin is warm and dry  Neurological:      Mental Status: She is alert     Psychiatric:         Mood and Affect: Mood normal          Behavior: Behavior normal        Wayne Aubrie, DO  MEDICAL [id number]

## 2020-08-25 NOTE — PATIENT INSTRUCTIONS
Medicare Preventive Visit Patient Instructions  Thank you for completing your Welcome to Medicare Visit or Medicare Annual Wellness Visit today  Your next wellness visit will be due in one year (8/25/2021)  The screening/preventive services that you may require over the next 5-10 years are detailed below  Some tests may not apply to you based off risk factors and/or age  Screening tests ordered at today's visit but not completed yet may show as past due  Also, please note that scanned in results may not display below  Preventive Screenings:  Service Recommendations Previous Testing/Comments   Colorectal Cancer Screening  * Colonoscopy    * Fecal Occult Blood Test (FOBT)/Fecal Immunochemical Test (FIT)  * Fecal DNA/Cologuard Test  * Flexible Sigmoidoscopy Age: 54-65 years old   Colonoscopy: every 10 years (may be performed more frequently if at higher risk)  OR  FOBT/FIT: every 1 year  OR  Cologuard: every 3 years  OR  Sigmoidoscopy: every 5 years  Screening may be recommended earlier than age 48 if at higher risk for colorectal cancer  Also, an individualized decision between you and your healthcare provider will decide whether screening between the ages of 74-80 would be appropriate  Colonoscopy: Not on file  FOBT/FIT: Not on file  Cologuard: Not on file  Sigmoidoscopy: Not on file    Patient Declines     Breast Cancer Screening Age: 36 years old  Frequency: every 1-2 years  Not required if history of left and right mastectomy Mammogram: 05/29/2014    Patient Declines   Cervical Cancer Screening Between the ages of 21-29, pap smear recommended once every 3 years  Between the ages of 33-67, can perform pap smear with HPV co-testing every 5 years     Recommendations may differ for women with a history of total hysterectomy, cervical cancer, or abnormal pap smears in past  Pap Smear: Not on file    Screening Not Indicated   Hepatitis C Screening Once for adults born between 1945 and 1965  More frequently in patients at high risk for Hepatitis C Hep C Antibody: 09/10/2019    Screening Current   Diabetes Screening 1-2 times per year if you're at risk for diabetes or have pre-diabetes Fasting glucose: 97 mg/dL   A1C: 5 2 %    Screening Current   Cholesterol Screening Once every 5 years if you don't have a lipid disorder  May order more often based on risk factors  Lipid panel: 02/19/2020    Screening Not Indicated  History Lipid Disorder     Other Preventive Screenings Covered by Medicare:  1  Abdominal Aortic Aneurysm (AAA) Screening: covered once if your at risk  You're considered to be at risk if you have a family history of AAA  2  Lung Cancer Screening: covers low dose CT scan once per year if you meet all of the following conditions: (1) Age 50-69; (2) No signs or symptoms of lung cancer; (3) Current smoker or have quit smoking within the last 15 years; (4) You have a tobacco smoking history of at least 30 pack years (packs per day multiplied by number of years you smoked); (5) You get a written order from a healthcare provider  3  Glaucoma Screening: covered annually if you're considered high risk: (1) You have diabetes OR (2) Family history of glaucoma OR (3)  aged 48 and older OR (3)  American aged 72 and older  3  Osteoporosis Screening: covered every 2 years if you meet one of the following conditions: (1) You're estrogen deficient and at risk for osteoporosis based off medical history and other findings; (2) Have a vertebral abnormality; (3) On glucocorticoid therapy for more than 3 months; (4) Have primary hyperparathyroidism; (5) On osteoporosis medications and need to assess response to drug therapy  · Last bone density test (DXA Scan): 06/30/2016   5  HIV Screening: covered annually if you're between the age of 15-65  Also covered annually if you are younger than 13 and older than 72 with risk factors for HIV infection   For pregnant patients, it is covered up to 3 times per pregnancy  Immunizations:  Immunization Recommendations   Influenza Vaccine Annual influenza vaccination during flu season is recommended for all persons aged >= 6 months who do not have contraindications   Pneumococcal Vaccine (Prevnar and Pneumovax)  * Prevnar = PCV13  * Pneumovax = PPSV23   Adults 25-60 years old: 1-3 doses may be recommended based on certain risk factors  Adults 72 years old: Prevnar (PCV13) vaccine recommended followed by Pneumovax (PPSV23) vaccine  If already received PPSV23 since turning 65, then PCV13 recommended at least one year after PPSV23 dose  Hepatitis B Vaccine 3 dose series if at intermediate or high risk (ex: diabetes, end stage renal disease, liver disease)   Tetanus (Td) Vaccine - COST NOT COVERED BY MEDICARE PART B Following completion of primary series, a booster dose should be given every 10 years to maintain immunity against tetanus  Td may also be given as tetanus wound prophylaxis  Tdap Vaccine - COST NOT COVERED BY MEDICARE PART B Recommended at least once for all adults  For pregnant patients, recommended with each pregnancy  Shingles Vaccine (Shingrix) - COST NOT COVERED BY MEDICARE PART B  2 shot series recommended in those aged 48 and above     Health Maintenance Due:      Topic Date Due    MAMMOGRAM  05/29/2015    Hepatitis C Screening  Completed     Immunizations Due:      Topic Date Due    DTaP,Tdap,and Td Vaccines (1 - Tdap) 09[birth year]/1967    Influenza Vaccine  07/01/2020     Advance Directives   What are advance directives? Advance directives are legal documents that state your wishes and plans for medical care  These plans are made ahead of time in case you lose your ability to make decisions for yourself  Advance directives can apply to any medical decision, such as the treatments you want, and if you want to donate organs  What are the types of advance directives?   There are many types of advance directives, and each state has rules about how to use them  You may choose a combination of any of the following:  · Living will: This is a written record of the treatment you want  You can also choose which treatments you do not want, which to limit, and which to stop at a certain time  This includes surgery, medicine, IV fluid, and tube feedings  · Durable power of  for healthcare [facility]): This is a written record that states who you want to make healthcare choices for you when you are unable to make them for yourself  This person, called a proxy, is usually a family member or a friend  You may choose more than 1 proxy  · Do not resuscitate (DNR) order:  A DNR order is used in case your heart stops beating or you stop breathing  It is a request not to have certain forms of treatment, such as CPR  A DNR order may be included in other types of advance directives  · Medical directive: This covers the care that you want if you are in a coma, near death, or unable to make decisions for yourself  You can list the treatments you want for each condition  Treatment may include pain medicine, surgery, blood transfusions, dialysis, IV or tube feedings, and a ventilator (breathing machine)  · Values history: This document has questions about your views, beliefs, and how you feel and think about life  This information can help others choose the care that you would choose  Why are advance directives important? An advance directive helps you control your care  Although spoken wishes may be used, it is better to have your wishes written down  Spoken wishes can be misunderstood, or not followed  Treatments may be given even if you do not want them  An advance directive may make it easier for your family to make difficult choices about your care  Weight Management   Why it is important to manage your weight:  Being overweight increases your risk of health conditions such as heart disease, high blood pressure, type 2 diabetes, and certain types of cancer   It can also increase your risk for osteoarthritis, sleep apnea, and other respiratory problems  Aim for a slow, steady weight loss  Even a small amount of weight loss can lower your risk of health problems  How to lose weight safely:  A safe and healthy way to lose weight is to eat fewer calories and get regular exercise  You can lose up about 1 pound a week by decreasing the number of calories you eat by 500 calories each day  Healthy meal plan for weight management:  A healthy meal plan includes a variety of foods, contains fewer calories, and helps you stay healthy  A healthy meal plan includes the following:  · Eat whole-grain foods more often  A healthy meal plan should contain fiber  Fiber is the part of grains, fruits, and vegetables that is not broken down by your body  Whole-grain foods are healthy and provide extra fiber in your diet  Some examples of whole-grain foods are whole-wheat breads and pastas, oatmeal, brown rice, and bulgur  · Eat a variety of vegetables every day  Include dark, leafy greens such as spinach, kale, char greens, and mustard greens  Eat yellow and orange vegetables such as carrots, sweet potatoes, and winter squash  · Eat a variety of fruits every day  Choose fresh or canned fruit (canned in its own juice or light syrup) instead of juice  Fruit juice has very little or no fiber  · Eat low-fat dairy foods  Drink fat-free (skim) milk or 1% milk  Eat fat-free yogurt and low-fat cottage cheese  Try low-fat cheeses such as mozzarella and other reduced-fat cheeses  · Choose meat and other protein foods that are low in fat  Choose beans or other legumes such as split peas or lentils  Choose fish, skinless poultry (chicken or turkey), or lean cuts of red meat (beef or pork)  Before you cook meat or poultry, cut off any visible fat  · Use less fat and oil  Try baking foods instead of frying them   Add less fat, such as margarine, sour cream, regular salad dressing and mayonnaise to foods  Eat fewer high-fat foods  Some examples of high-fat foods include french fries, doughnuts, ice cream, and cakes  · Eat fewer sweets  Limit foods and drinks that are high in sugar  This includes candy, cookies, regular soda, and sweetened drinks  Exercise:  Exercise at least 30 minutes per day on most days of the week  Some examples of exercise include walking, biking, dancing, and swimming  You can also fit in more physical activity by taking the stairs instead of the elevator or parking farther away from stores  Ask your healthcare provider about the best exercise plan for you  © Copyright [employer] 2018 Information is for End User's use only and may not be sold, redistributed or otherwise used for commercial purposes   All illustrations and images included in CareNotes® are the copyrighted property of A D [employer] M , [employer]  or [address] [place] [address]

## 2020-08-25 NOTE — TELEPHONE ENCOUNTER
Discussed with patient CT scan of the pelvis results, she does follow up with Dr Joselito Cowan, I advised her to follow up with him regarding the abnormal CT scan of the pelvis

## 2020-08-25 NOTE — TELEPHONE ENCOUNTER
Pt returning your call  You were with a pt   Please call her at [phone] to leave a detailed message

## 2020-08-25 NOTE — TELEPHONE ENCOUNTER
Left message for the patient to discuss the CT scan of the pelvis results, she will need to see a GI specialist to have a colonoscopy

## 2020-09-04 ENCOUNTER — APPOINTMENT (OUTPATIENT)
Dept: LAB | Facility: HOSPITAL | Age: 75
End: 2020-09-04
Attending: PSYCHIATRY & NEUROLOGY
Payer: MEDICARE

## 2020-09-04 ENCOUNTER — TELEPHONE (OUTPATIENT)
Dept: NEUROLOGY | Facility: CLINIC | Age: 75
End: 2020-09-04

## 2020-09-04 DIAGNOSIS — D35.2 PITUITARY ADENOMA (HCC): ICD-10-CM

## 2020-09-04 DIAGNOSIS — R59.0 PELVIC LYMPHADENOPATHY: Primary | ICD-10-CM

## 2020-09-04 LAB — PROLACTIN SERPL-MCNC: 25.3 NG/ML

## 2020-09-04 PROCEDURE — 84146 ASSAY OF PROLACTIN: CPT

## 2020-09-04 PROCEDURE — 36415 COLL VENOUS BLD VENIPUNCTURE: CPT

## 2020-09-04 NOTE — TELEPHONE ENCOUNTER
Lab calling to say that patient has arrived to get prolactin level drawn  I do not see an order for this since 8/3 which she completed on 8/12  Lab put the patient on the line  She states that she is supposed to get one more level drawn because of the adjustment to her cabergoline medication  Please advise  Patient will wait at the lab for a short time  Requesting a call back

## 2020-09-04 NOTE — TELEPHONE ENCOUNTER
Attempted to reach patient on both numbers on file  No answer  Left message to call office back  Patient did say that she was at the lab in [place]  Attempted to contact labs but only got through 1  Patient was not located at lab that answered

## 2020-09-09 DIAGNOSIS — E03.9 ACQUIRED HYPOTHYROIDISM: ICD-10-CM

## 2020-09-09 RX ORDER — LEVOTHYROXINE SODIUM 0.05 MG/1
TABLET ORAL
Qty: 90 TABLET | Refills: 3 | Status: SHIPPED | OUTPATIENT
Start: 2020-09-09 | End: 2021-09-16

## 2020-09-10 ENCOUNTER — OFFICE VISIT (OUTPATIENT)
Dept: GASTROENTEROLOGY | Facility: CLINIC | Age: 75
End: 2020-09-10
Payer: MEDICARE

## 2020-09-10 ENCOUNTER — TELEPHONE (OUTPATIENT)
Dept: GASTROENTEROLOGY | Facility: CLINIC | Age: 75
End: 2020-09-10

## 2020-09-10 VITALS
BODY MASS INDEX: 26.46 KG/M2 | HEIGHT: 64 IN | WEIGHT: 155 LBS | TEMPERATURE: 97.8 F | DIASTOLIC BLOOD PRESSURE: 78 MMHG | HEART RATE: 80 BPM | SYSTOLIC BLOOD PRESSURE: 128 MMHG

## 2020-09-10 DIAGNOSIS — R93.3 ABNORMAL CT SCAN, COLON: Primary | ICD-10-CM

## 2020-09-10 PROCEDURE — 99214 OFFICE O/P EST MOD 30 MIN: CPT | Performed by: INTERNAL MEDICINE

## 2020-09-10 NOTE — H&P (VIEW-ONLY)
[facility]      Chief Complaint:  Abnormal CT scan    HPI:  Jose Gutierres is a 69 y o   female who presents with abnormal CT of the colon  This was done during a long workup in follow-up for her pituitary tumor and some subsequent symptomatology  She developed some lower extremity issues  Ultrasounds performed suggested a CT and the CT scan showed a thick descending colon with possible tumor  The patient herself has no abdominal pain, change in bowel habits, change in stool caliber, melanotic stool, hematochezia, , tenesmus, or weight loss     She has rare rectal bleeding from hemorrhoids  She has not a colonoscopy in about 12 years  She has a pituitary tumor which at this point is nonoperable  They are in the process of changing medications  She is having significant orthopedic issues  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  She has no other complaints at the present time  Review of Systems:   Constitutional: No fever or chills, feels well, no tiredness, no recent weight gain or weight loss  HENT: No complaints of earache, no hearing loss, no nosebleeds, no nasal discharge, no sore throat, no hoarseness  Eyes: No complaints of eye pain, no red eyes, no discharge from eyes, no itchy eyes  Cardiovascular: No complaints of slow heart rate, no fast heart rate, no chest pain, no palpitations, no leg claudication, no lower extremity edema  Respiratory: No complaints of shortness of breath, no wheezing, no cough, no SOB on exertion, no orthopnea  Gastrointestinal: As noted in HPI  Genitourinary: No complaints of dysuria, no incontinence, no hesitancy, no nocturia  Musculoskeletal:  Positive f arthralgia, no myalgias, no joint swelling or stiffness, no limb pain or swelling  Neurological: No complaints of headache, no confusion, no convulsions, positive peripheral, no dizziness or fainting, no limb weakness, no difficulty walking    Positive headache  Skin: No complaints of skin rash or skin lesions, no itching, no skin wound, no dry skin  Hematological/Lymphatic: No complaints of swollen glands, does not bleed easy  Allergic/Immunologic:  Positive immunocompromised state  Endocrine:  No complaints of polyuria, no polydipsia  Psychiatric/Behavioral: is not suicidal, no sleep disturbances, no anxiety or depression, no change in personality, no emotional problems         Historical Information   Past Medical History:   Diagnosis Date    [facility]fib ([facility] )     Abnormal brain MRI     Anemia     last assessed: 12/14/2015    Ankle fracture     Anxiety     last assessed: 11/14/2017    Atrial fibrillation (HCC)     last assessed: 1/18/2015    Blepharoptosis     Brain mass     Cataract     Colon polyps     Depression     Disease of thyroid gland     Dysphagia     Elevated prolactin level     Esophageal perforation     Fracture, shoulder     GERD (gastroesophageal reflux disease)     Headache     Hypercholesterolemia     Hyperlipidemia     [facility]actin[facility]     Hypertension     Hypotension     Hypothyroidism     last assessed: 11/20/2017    Left heart failure ([facility])     Loss of smell     Lumbago with sciatica     Macroadenoma     last assessed: 7/23/2015    Migraine     Obesity     Osteoporosis     Pituitary adenoma ([facility] )     last assessed: 11/20/2017    Pituitary tumor     Prolactinoma ([facility] )     Renal failure     Syncope     Third nerve palsy     Unstable balance     Vitamin D deficiency     Wrist fracture      Past Surgical History:   Procedure Laterality Date    APPENDECTOMY      CATARACT EXTRACTION      CORONARY ANGIOPLASTY WITH STENT PLACEMENT  01/2016    DILATION AND CURETTAGE OF UTERUS      ELBOW SURGERY Left     replacement    ESOPHAGOGASTRODUODENOSCOPY  08/29/2012    diagnostic    FIXATION KYPHOPLASTY      HYSTERECTOMY      LA ESOPHAGOGASTRODUODENOSCOPY TRANSORAL DIAGNOSTIC N/A 1/25/2018 Procedure: ESOPHAGOGASTRODUODENOSCOPY (EGD); Surgeon: Rosa James MD;  Location: [facility]; Service: Gastroenterology    TONSILLECTOMY      TOTAL ABDOMINAL HYSTERECTOMY W/ BILATERAL SALPINGOOPHORECTOMY      TOTAL ELBOW REPLACEMENT  02/07/2019    VERTEBRAL AUGMENTATION      percutaneous vertebral augmentation kyphoplasty     Social History   Social History     Substance and Sexual Activity   Alcohol Use Yes    Frequency: 2-4 times a month    Drinks per session: 1 or 2    Binge frequency: Never    Comment: social; drinks wine     Social History     Substance and Sexual Activity   Drug Use No     Social History     Tobacco Use   Smoking Status Never Smoker   Smokeless Tobacco Never Used     Family History   Problem Relation Age of Onset    Osteoporosis Mother     Other Mother         sepsis    Other Father         cerebellar hemorrhage; hemorrhage in brain    Hypertension Father     Thyroid disease Sister         thyroid disorder    Colon cancer Maternal Grandmother     Heart attack Maternal Grandfather         acute myocardial infarction    Stomach cancer Paternal Grandmother     Other Paternal Grandfather         urinary retention    Stroke Paternal Uncle         syndrome         Current Medications: has a current medication list which includes the following prescription(s): amoxicillin, butalbital-acetaminophen-caffeine, cabergoline, vitamin d, gabapentin, levothyroxine, lisinopril, lorazepam, metoprolol tartrate, midodrine, pantoprazole, simvastatin, and xarelto  Vital Signs: /78   Pulse 80   Temp 97 8 °F (36 6 °C)   Ht 5' 4" (1 626 m)   Wt 70 3 kg (155 lb)   BMI 26 61 kg/m²       Physical Exam:   Constitutional  General Appearance: No acute distress, well appearing and well nourished  Head  Normocephalic  Eyes  Conjunctivae and lids: No swelling, erythema, or discharge  Pupils and irises: Equal, round and reactive to light     Ears, Nose, Mouth, and Throat  External inspection of ears and nose: Normal  Nasal mucosa, septum and turbinates: Normal without edema or erythema/   Oropharynx: Normal with no erythema, edema, exudate or lesions  Neck  Normal range of motion  Neck supple  Cardiovascular  Auscultation of the heart: Normal rate and rhythm, normal S1 and S2 without murmurs  Examination of the extremities for edema and/or varicosities: Normal  Pulmonary/Chest  Respiratory effort: No increased work of breathing or signs of respiratory distress  Auscultation of lungs: Clear to auscultation, equal breath sounds bilaterally, no wheezes, rales, no rhonchi  Abdomen  Abdomen: Non-tender, no masses  Liver and spleen: No hepatomegaly or splenomegaly  Musculoskeletal  Gait and station: normal   Digits and Nails: normal without clubbing or cyanosis  Inspection/palpation of joints, bones, and muscles: Normal  Neurological  No nystagmus or asterixis  Skin  Skin and subcutaneous tissue: Normal without rashes or lesions  Lymphatic  Palpation of the lymph nodes in neck: No lymphadenopathy  Psychiatric  Orientation to person, place and time: Normal   Mood and affect: Normal          Labs:  Lab Results   Component Value Date    ALT 11 (L) 05/07/2019    AST 18 05/07/2019    BUN 15 06/11/2020    CALCIUM 9 6 02/19/2020     02/19/2020    CHOL 196 12/07/2015    CO2 23 02/19/2020    CREATININE 1 06 06/11/2020     02/19/2020    HCT 35 6 02/19/2020    HGB 11 2 (L) 02/19/2020    HGBA1C 5 2 10/25/2019    MG 1 7 12/07/2015    PHOS 3 4 02/07/2017     02/19/2020    K 4 3 02/19/2020     12/07/2015    TRIG 86 02/19/2020    WBC 6 68 02/19/2020         X-Rays & Procedures:   No orders to display           ______________________________________________________________________      Assessment & Plan:     Diagnoses and all orders for this visit:    Abnormal CT scan, colon      Patient will be scheduled for colonoscopy    Further recommendations will depend on the study results

## 2020-09-10 NOTE — PROGRESS NOTES
[facility]      Chief Complaint:  Abnormal CT scan    HPI:  Umair Nowak is a 69 y o   female who presents with abnormal CT of the colon  This was done during a long workup in follow-up for her pituitary tumor and some subsequent symptomatology  She developed some lower extremity issues  Ultrasounds performed suggested a CT and the CT scan showed a thick descending colon with possible tumor  The patient herself has no abdominal pain, change in bowel habits, change in stool caliber, melanotic stool, hematochezia, , tenesmus, or weight loss     She has rare rectal bleeding from hemorrhoids  She has not a colonoscopy in about 12 years  She has a pituitary tumor which at this point is nonoperable  They are in the process of changing medications  She is having significant orthopedic issues  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  She has no other complaints at the present time  Review of Systems:   Constitutional: No fever or chills, feels well, no tiredness, no recent weight gain or weight loss  HENT: No complaints of earache, no hearing loss, no nosebleeds, no nasal discharge, no sore throat, no hoarseness  Eyes: No complaints of eye pain, no red eyes, no discharge from eyes, no itchy eyes  Cardiovascular: No complaints of slow heart rate, no fast heart rate, no chest pain, no palpitations, no leg claudication, no lower extremity edema  Respiratory: No complaints of shortness of breath, no wheezing, no cough, no SOB on exertion, no orthopnea  Gastrointestinal: As noted in HPI  Genitourinary: No complaints of dysuria, no incontinence, no hesitancy, no nocturia  Musculoskeletal:  Positive f arthralgia, no myalgias, no joint swelling or stiffness, no limb pain or swelling  Neurological: No complaints of headache, no confusion, no convulsions, positive peripheral, no dizziness or fainting, no limb weakness, no difficulty walking    Positive headache  Skin: No complaints of skin rash or skin lesions, no itching, no skin wound, no dry skin  Hematological/Lymphatic: No complaints of swollen glands, does not bleed easy  Allergic/Immunologic:  Positive immunocompromised state  Endocrine:  No complaints of polyuria, no polydipsia  Psychiatric/Behavioral: is not suicidal, no sleep disturbances, no anxiety or depression, no change in personality, no emotional problems         Historical Information   Past Medical History:   Diagnosis Date    [facility]fib ([facility] )     Abnormal brain MRI     Anemia     last assessed: 12/14/2015    Ankle fracture     Anxiety     last assessed: 11/14/2017    Atrial fibrillation (HCC)     last assessed: 1/18/2015    Blepharoptosis     Brain mass     Cataract     Colon polyps     Depression     Disease of thyroid gland     Dysphagia     Elevated prolactin level     Esophageal perforation     Fracture, shoulder     GERD (gastroesophageal reflux disease)     Headache     Hypercholesterolemia     Hyperlipidemia     [facility])     Hypertension     Hypotension     Hypothyroidism     last assessed: 11/20/2017    Left heart failure ([facility])     Loss of smell     Lumbago with sciatica     Macroadenoma     last assessed: 7/23/2015    Migraine     Obesity     Osteoporosis     Pituitary adenoma ([facility] )     last assessed: 11/20/2017    Pituitary tumor     Prolactinoma ([facility] )     Renal failure     Syncope     Third nerve palsy     Unstable balance     Vitamin D deficiency     Wrist fracture      Past Surgical History:   Procedure Laterality Date    APPENDECTOMY      CATARACT EXTRACTION      CORONARY ANGIOPLASTY WITH STENT PLACEMENT  01/2016    DILATION AND CURETTAGE OF UTERUS      ELBOW SURGERY Left     replacement    ESOPHAGOGASTRODUODENOSCOPY  08/29/2012    diagnostic    FIXATION KYPHOPLASTY      HYSTERECTOMY      LA ESOPHAGOGASTRODUODENOSCOPY TRANSORAL DIAGNOSTIC N/A 1/25/2018 Procedure: ESOPHAGOGASTRODUODENOSCOPY (EGD); Surgeon: Jyoti Vazquez MD;  Location: [facility]; Service: Gastroenterology    TONSILLECTOMY      TOTAL ABDOMINAL HYSTERECTOMY W/ BILATERAL SALPINGOOPHORECTOMY      TOTAL ELBOW REPLACEMENT  02/07/2019    VERTEBRAL AUGMENTATION      percutaneous vertebral augmentation kyphoplasty     Social History   Social History     Substance and Sexual Activity   Alcohol Use Yes    Frequency: 2-4 times a month    Drinks per session: 1 or 2    Binge frequency: Never    Comment: social; drinks wine     Social History     Substance and Sexual Activity   Drug Use No     Social History     Tobacco Use   Smoking Status Never Smoker   Smokeless Tobacco Never Used     Family History   Problem Relation Age of Onset    Osteoporosis Mother     Other Mother         sepsis    Other Father         cerebellar hemorrhage; hemorrhage in brain    Hypertension Father     Thyroid disease Sister         thyroid disorder    Colon cancer Maternal Grandmother     Heart attack Maternal Grandfather         acute myocardial infarction    Stomach cancer Paternal Grandmother     Other Paternal Grandfather         urinary retention    Stroke Paternal Uncle         syndrome         Current Medications: has a current medication list which includes the following prescription(s): amoxicillin, butalbital-acetaminophen-caffeine, cabergoline, vitamin d, gabapentin, levothyroxine, lisinopril, lorazepam, metoprolol tartrate, midodrine, pantoprazole, simvastatin, and xarelto  Vital Signs: /78   Pulse 80   Temp 97 8 °F (36 6 °C)   Ht 5' 4" (1 626 m)   Wt 70 3 kg (155 lb)   BMI 26 61 kg/m²       Physical Exam:   Constitutional  General Appearance: No acute distress, well appearing and well nourished  Head  Normocephalic  Eyes  Conjunctivae and lids: No swelling, erythema, or discharge  Pupils and irises: Equal, round and reactive to light     Ears, Nose, Mouth, and Throat  External inspection of ears and nose: Normal  Nasal mucosa, septum and turbinates: Normal without edema or erythema/   Oropharynx: Normal with no erythema, edema, exudate or lesions  Neck  Normal range of motion  Neck supple  Cardiovascular  Auscultation of the heart: Normal rate and rhythm, normal S1 and S2 without murmurs  Examination of the extremities for edema and/or varicosities: Normal  Pulmonary/Chest  Respiratory effort: No increased work of breathing or signs of respiratory distress  Auscultation of lungs: Clear to auscultation, equal breath sounds bilaterally, no wheezes, rales, no rhonchi  Abdomen  Abdomen: Non-tender, no masses  Liver and spleen: No hepatomegaly or splenomegaly  Musculoskeletal  Gait and station: normal   Digits and Nails: normal without clubbing or cyanosis  Inspection/palpation of joints, bones, and muscles: Normal  Neurological  No nystagmus or asterixis  Skin  Skin and subcutaneous tissue: Normal without rashes or lesions  Lymphatic  Palpation of the lymph nodes in neck: No lymphadenopathy  Psychiatric  Orientation to person, place and time: Normal   Mood and affect: Normal          Labs:  Lab Results   Component Value Date    ALT 11 (L) 05/07/2019    AST 18 05/07/2019    BUN 15 06/11/2020    CALCIUM 9 6 02/19/2020     02/19/2020    CHOL 196 12/07/2015    CO2 23 02/19/2020    CREATININE 1 06 06/11/2020     02/19/2020    HCT 35 6 02/19/2020    HGB 11 2 (L) 02/19/2020    HGBA1C 5 2 10/25/2019    MG 1 7 12/07/2015    PHOS 3 4 02/07/2017     02/19/2020    K 4 3 02/19/2020     12/07/2015    TRIG 86 02/19/2020    WBC 6 68 02/19/2020         X-Rays & Procedures:   No orders to display           ______________________________________________________________________      Assessment & Plan:     Diagnoses and all orders for this visit:    Abnormal CT scan, colon      Patient will be scheduled for colonoscopy    Further recommendations will depend on the study results

## 2020-09-24 ENCOUNTER — ANESTHESIA (OUTPATIENT)
Dept: GASTROENTEROLOGY | Facility: HOSPITAL | Age: 75
End: 2020-09-24

## 2020-09-24 ENCOUNTER — ANESTHESIA EVENT (OUTPATIENT)
Dept: GASTROENTEROLOGY | Facility: HOSPITAL | Age: 75
End: 2020-09-24

## 2020-09-24 ENCOUNTER — HOSPITAL ENCOUNTER (OUTPATIENT)
Dept: GASTROENTEROLOGY | Facility: HOSPITAL | Age: 75
Setting detail: OUTPATIENT SURGERY
Discharge: HOME | End: 2020-09-24
Attending: INTERNAL MEDICINE | Admitting: INTERNAL MEDICINE
Payer: MEDICARE

## 2020-09-24 VITALS — HEART RATE: 96 BPM

## 2020-09-24 VITALS
DIASTOLIC BLOOD PRESSURE: 79 MMHG | SYSTOLIC BLOOD PRESSURE: 164 MMHG | RESPIRATION RATE: 16 BRPM | OXYGEN SATURATION: 12 % | HEART RATE: 101 BPM | TEMPERATURE: 97.2 F

## 2020-09-24 DIAGNOSIS — R93.3 ABNORMAL CT SCAN, COLON: ICD-10-CM

## 2020-09-24 DIAGNOSIS — K21.9 CHRONIC GERD: ICD-10-CM

## 2020-09-24 PROCEDURE — 88305 TISSUE EXAM BY PATHOLOGIST: CPT | Performed by: PATHOLOGY

## 2020-09-24 PROCEDURE — 45380 COLONOSCOPY AND BIOPSY: CPT | Performed by: INTERNAL MEDICINE

## 2020-09-24 PROCEDURE — 45381 COLONOSCOPY SUBMUCOUS NJX: CPT | Performed by: INTERNAL MEDICINE

## 2020-09-24 RX ORDER — LIDOCAINE HYDROCHLORIDE 10 MG/ML
INJECTION, SOLUTION EPIDURAL; INFILTRATION; INTRACAUDAL; PERINEURAL AS NEEDED
Status: DISCONTINUED | OUTPATIENT
Start: 2020-09-24 | End: 2020-09-24

## 2020-09-24 RX ORDER — SODIUM CHLORIDE, SODIUM LACTATE, POTASSIUM CHLORIDE, CALCIUM CHLORIDE 600; 310; 30; 20 MG/100ML; MG/100ML; MG/100ML; MG/100ML
INJECTION, SOLUTION INTRAVENOUS CONTINUOUS PRN
Status: DISCONTINUED | OUTPATIENT
Start: 2020-09-24 | End: 2020-09-24

## 2020-09-24 RX ORDER — SODIUM CHLORIDE, SODIUM LACTATE, POTASSIUM CHLORIDE, CALCIUM CHLORIDE 600; 310; 30; 20 MG/100ML; MG/100ML; MG/100ML; MG/100ML
100 INJECTION, SOLUTION INTRAVENOUS CONTINUOUS
Status: DISCONTINUED | OUTPATIENT
Start: 2020-09-24 | End: 2020-09-28 | Stop reason: HOSPADM

## 2020-09-24 RX ORDER — HYDRALAZINE HYDROCHLORIDE 20 MG/ML
INJECTION INTRAMUSCULAR; INTRAVENOUS AS NEEDED
Status: DISCONTINUED | OUTPATIENT
Start: 2020-09-24 | End: 2020-09-24

## 2020-09-24 RX ORDER — LABETALOL 20 MG/4 ML (5 MG/ML) INTRAVENOUS SYRINGE
AS NEEDED
Status: DISCONTINUED | OUTPATIENT
Start: 2020-09-24 | End: 2020-09-24

## 2020-09-24 RX ORDER — LABETALOL 20 MG/4 ML (5 MG/ML) INTRAVENOUS SYRINGE
10 ONCE
Status: COMPLETED | OUTPATIENT
Start: 2020-09-24 | End: 2020-09-24

## 2020-09-24 RX ORDER — PROPOFOL 10 MG/ML
INJECTION, EMULSION INTRAVENOUS AS NEEDED
Status: DISCONTINUED | OUTPATIENT
Start: 2020-09-24 | End: 2020-09-24

## 2020-09-24 RX ORDER — METOCLOPRAMIDE HYDROCHLORIDE 5 MG/ML
10 INJECTION INTRAMUSCULAR; INTRAVENOUS EVERY 6 HOURS PRN
Status: DISCONTINUED | OUTPATIENT
Start: 2020-09-24 | End: 2020-09-28 | Stop reason: HOSPADM

## 2020-09-24 RX ADMIN — PROPOFOL 30 MG: 10 INJECTION, EMULSION INTRAVENOUS at 08:51

## 2020-09-24 RX ADMIN — PROPOFOL 50 MG: 10 INJECTION, EMULSION INTRAVENOUS at 08:46

## 2020-09-24 RX ADMIN — METOCLOPRAMIDE HYDROCHLORIDE 10 MG: 5 INJECTION INTRAMUSCULAR; INTRAVENOUS at 09:33

## 2020-09-24 RX ADMIN — PROPOFOL 30 MG: 10 INJECTION, EMULSION INTRAVENOUS at 08:43

## 2020-09-24 RX ADMIN — LIDOCAINE HYDROCHLORIDE 50 MG: 10 INJECTION, SOLUTION EPIDURAL; INFILTRATION; INTRACAUDAL; PERINEURAL at 08:41

## 2020-09-24 RX ADMIN — LABETALOL 20 MG/4 ML (5 MG/ML) INTRAVENOUS SYRINGE 5 MG: at 08:55

## 2020-09-24 RX ADMIN — SODIUM CHLORIDE, SODIUM LACTATE, POTASSIUM CHLORIDE, AND CALCIUM CHLORIDE 100 ML/HR: .6; .31; .03; .02 INJECTION, SOLUTION INTRAVENOUS at 08:09

## 2020-09-24 RX ADMIN — PROPOFOL 70 MG: 10 INJECTION, EMULSION INTRAVENOUS at 08:41

## 2020-09-24 RX ADMIN — LABETALOL 20 MG/4 ML (5 MG/ML) INTRAVENOUS SYRINGE 10 MG: at 08:10

## 2020-09-24 RX ADMIN — HYDRALAZINE HYDROCHLORIDE 5 MG: 20 INJECTION INTRAMUSCULAR; INTRAVENOUS at 08:59

## 2020-09-24 RX ADMIN — LABETALOL 20 MG/4 ML (5 MG/ML) INTRAVENOUS SYRINGE 5 MG: at 08:50

## 2020-09-24 RX ADMIN — PROPOFOL 50 MG: 10 INJECTION, EMULSION INTRAVENOUS at 08:48

## 2020-09-24 NOTE — DISCHARGE INSTRUCTIONS
Colonoscopy   WHAT YOU NEED TO KNOW:   A colonoscopy is a procedure to examine the inside of your colon (intestine) with a scope  Polyps or tissue growths may have been removed during your colonoscopy  It is normal to feel bloated and to have some abdominal discomfort  You should be passing gas  If you have hemorrhoids or you had polyps removed, you may have a small amount of bleeding  DISCHARGE INSTRUCTIONS:   Seek care immediately if:   · You have a large amount of bright red blood in your bowel movements  · Your abdomen is hard and firm and you have severe pain  · You have sudden trouble breathing  Contact your healthcare provider if:   · You develop a rash or hives  · You have a fever within 24 hours of your procedure  · You have not had a bowel movement for 3 days after your procedure  · You have questions or concerns about your condition or care  Activity:   · Do not lift, strain, or run  for 3 days after your procedure  · Rest after your procedure  You have been given medicine to relax you  Do not  drive or make important decisions until the day after your procedure  Return to your normal activity as directed  · Relieve gas and discomfort from bloating  by lying on your right side with a heating pad on your abdomen  You may need to take short walks to help the gas move out  Eat small meals until bloating is relieved  If you had polyps removed: For 7 days after your procedure:  · Do not  take aspirin  · Do not  go on long car rides  Help prevent constipation:   · Eat a variety of healthy foods  Healthy foods include fruit, vegetables, whole-grain breads, low-fat dairy products, beans, lean meat, and fish  Ask if you need to be on a special diet  Your healthcare provider may recommend that you eat high-fiber foods such as cooked beans  Fiber helps you have regular bowel movements  · Drink liquids as directed    Adults should drink between 9 and 13 eight-ounce cups of liquid every day  Ask what amount is best for you  For most people, good liquids to drink are water, juice, and milk  · Exercise as directed  Talk to your healthcare provider about the best exercise plan for you  Exercise can help prevent constipation, decrease your blood pressure and improve your health  Follow up with your healthcare provider as directed:  Write down your questions so you remember to ask them during your visits  © 2017 [facility] Information is for End User's use only and may not be sold, redistributed or otherwise used for commercial purposes  All illustrations and images included in CareNotes® are the copyrighted property of A D A M [employer] Inc  or Evelio Jaimes  The above information is an  only  It is not intended as medical advice for individual conditions or treatments  Talk to your doctor, nurse or pharmacist before following any medical regimen to see if it is safe and effective for you

## 2020-09-24 NOTE — ANESTHESIA PREPROCEDURE EVALUATION
2017 TTE: EF 55%, nml RV, no sig valve abn    Medical History     History  Comments    Disease of thyroid gland     Hypertension     Hyperlipidemia     Atrial fibrillation ([facility] )  last assessed: 1/18/2015    Anxiety  last assessed: 11/14/2017    Depression     GERD (gastroesophageal reflux disease)     Migraine     Pituitary tumor     Ankle fracture     Brain mass     Cataract     Esophageal perforation     Fracture, shoulder     Anemia  last assessed: 12/14/2015    Hypothyroidism  last assessed: 11/20/2017    Left heart failure ([facility] )     Macroadenoma  last assessed: 7/23/2015    Pit[facility] adenoma ([email or web address] )  last assessed: 11/20/2017    Renal failure     Wrist fracture     Headache     Abnormal brain MRI     A-fib (HCC)     Blepharoptosis     Dysphagia     Elevated prolactin level     Hypercholesterolemia     Hyperprolactinemia (HCC)     Hypotension     Loss of smell     Lumbago with sciatica     Obesity     Osteoporosis     Prolactinoma ([facility] )     Syncope     Third nerve palsy     Unstable balance     Vitamin D deficiency     Colon polyps       Procedure:  COLONOSCOPY    Relevant Problems   ANESTHESIA (within normal limits)      CARDIO   (+) Atrial fibrillation (HCC)   (+) Benign essential hypertension   (+) Hypercholesterolemia      ENDO   (+) Hypothyroidism      NEURO/PSYCH   (+) Anxiety        Physical Exam    Airway    Mallampati score: II  TM Distance: >3 FB  Neck ROM: full     Dental   No notable dental hx     Cardiovascular      Pulmonary      Other Findings  BP elevated and afib HR 's, pt did not take AM betablocker  -betablocker given in preop IV      Anesthesia Plan  ASA Score- 3     Anesthesia Type- IV sedation with anesthesia with ASA Monitors  Additional Monitors:   Airway Plan:     Comment: Per patient, appropriately NPO, denies active CP/SOB/wheezing/symptoms related to heartburn/nausea/vomiting  Plan Factors-Exercise tolerance (METS): >4 METS  Chart reviewed  EKG reviewed   Existing labs reviewed  Patient summary reviewed  Patient is not a current smoker  Induction- intravenous  Postoperative Plan-     Informed Consent- Anesthetic plan and risks discussed with patient  I personally reviewed this patient with the CRNA  Discussed and agreed on the Anesthesia Plan with the CRNA  Nicole Mercedes

## 2020-09-24 NOTE — INTERVAL H&P NOTE
H&P reviewed  After examining the patient I find no changes in the patients condition since the H&P had been written      Vitals:    09/24/20 0747   BP: (!) 187/102   Pulse: 92   Resp: 18   Temp: (!) 97 2 °F (36 2 °C)   SpO2: 98%

## 2020-09-24 NOTE — ANESTHESIA POSTPROCEDURE EVALUATION
Post-Op Assessment Note    CV Status:  Stable    Pain management: adequate     Mental Status:  Alert and awake   Hydration Status:  Euvolemic   PONV Controlled:  Controlled   Airway Patency:  Patent      Post Op Vitals Reviewed: Yes      Staff: CRNA         No complications documented      BP  133/65   Temp     Pulse  90   Resp   18   SpO2   99

## 2020-10-06 ENCOUNTER — TELEPHONE (OUTPATIENT)
Dept: GASTROENTEROLOGY | Facility: CLINIC | Age: 75
End: 2020-10-06

## 2020-10-08 DIAGNOSIS — I10 ESSENTIAL HYPERTENSION: ICD-10-CM

## 2020-10-08 RX ORDER — METOPROLOL TARTRATE 50 MG/1
TABLET, FILM COATED ORAL
Qty: 180 TABLET | Refills: 0 | Status: SHIPPED | OUTPATIENT
Start: 2020-10-08 | End: 2021-01-25

## 2020-10-28 DIAGNOSIS — I48.91 ATRIAL FIBRILLATION, UNSPECIFIED TYPE (HCC): ICD-10-CM

## 2020-10-29 RX ORDER — RIVAROXABAN 20 MG/1
TABLET, FILM COATED ORAL
Qty: 90 TABLET | Refills: 1 | Status: SHIPPED | OUTPATIENT
Start: 2020-10-29 | End: 2021-04-29

## 2020-11-04 ENCOUNTER — OFFICE VISIT (OUTPATIENT)
Dept: NEUROLOGY | Facility: CLINIC | Age: 75
End: 2020-11-04
Payer: MEDICARE

## 2020-11-04 VITALS
BODY MASS INDEX: 26.29 KG/M2 | SYSTOLIC BLOOD PRESSURE: 130 MMHG | TEMPERATURE: 97.8 F | WEIGHT: 154 LBS | HEART RATE: 84 BPM | DIASTOLIC BLOOD PRESSURE: 70 MMHG | HEIGHT: 64 IN

## 2020-11-04 DIAGNOSIS — D35.2 PITUITARY ADENOMA (HCC): ICD-10-CM

## 2020-11-04 DIAGNOSIS — G44.229 CHRONIC TENSION-TYPE HEADACHE, NOT INTRACTABLE: Primary | ICD-10-CM

## 2020-11-04 PROCEDURE — 99213 OFFICE O/P EST LOW 20 MIN: CPT | Performed by: PSYCHIATRY & NEUROLOGY

## 2020-11-30 DIAGNOSIS — F41.9 ANXIETY: ICD-10-CM

## 2020-11-30 DIAGNOSIS — K21.9 CHRONIC GERD: ICD-10-CM

## 2020-11-30 RX ORDER — PANTOPRAZOLE SODIUM 40 MG/1
TABLET, DELAYED RELEASE ORAL
Qty: 90 TABLET | Refills: 3 | Status: SHIPPED | OUTPATIENT
Start: 2020-11-30 | End: 2021-09-16

## 2020-11-30 RX ORDER — LORAZEPAM 1 MG/1
TABLET ORAL
Qty: 60 TABLET | Refills: 3 | Status: SHIPPED | OUTPATIENT
Start: 2020-11-30 | End: 2021-04-07

## 2020-12-10 DIAGNOSIS — D35.2 PITUITARY ADENOMA (HCC): ICD-10-CM

## 2020-12-10 RX ORDER — CABERGOLINE 0.5 MG/1
TABLET ORAL
Qty: 90 TABLET | Refills: 1 | Status: SHIPPED | OUTPATIENT
Start: 2020-12-10 | End: 2021-07-06

## 2020-12-17 DIAGNOSIS — I10 ESSENTIAL HYPERTENSION: ICD-10-CM

## 2020-12-17 RX ORDER — LISINOPRIL 5 MG/1
TABLET ORAL
Qty: 90 TABLET | Refills: 3 | Status: SHIPPED | OUTPATIENT
Start: 2020-12-17 | End: 2021-09-16

## 2021-01-05 DIAGNOSIS — D35.2 PITUITARY ADENOMA (HCC): Chronic | ICD-10-CM

## 2021-01-05 DIAGNOSIS — G44.229 CHRONIC TENSION-TYPE HEADACHE, NOT INTRACTABLE: ICD-10-CM

## 2021-01-07 RX ORDER — BUTALBITAL, ACETAMINOPHEN AND CAFFEINE 50; 325; 40 MG/1; MG/1; MG/1
TABLET ORAL
Qty: 60 TABLET | Refills: 1 | Status: SHIPPED | OUTPATIENT
Start: 2021-01-07 | End: 2021-03-08

## 2021-01-25 DIAGNOSIS — I10 ESSENTIAL HYPERTENSION: ICD-10-CM

## 2021-01-25 RX ORDER — METOPROLOL TARTRATE 50 MG/1
TABLET, FILM COATED ORAL
Qty: 180 TABLET | Refills: 0 | Status: SHIPPED | OUTPATIENT
Start: 2021-01-25 | End: 2021-04-20

## 2021-02-12 ENCOUNTER — TELEPHONE (OUTPATIENT)
Dept: NEUROLOGY | Facility: CLINIC | Age: 76
End: 2021-02-12

## 2021-02-12 NOTE — TELEPHONE ENCOUNTER
Patient is asking if you need her to have a prolactin level done before her appointment with you in May

## 2021-02-15 DIAGNOSIS — D35.2 PITUITARY ADENOMA (HCC): Primary | ICD-10-CM

## 2021-02-17 ENCOUNTER — LAB (OUTPATIENT)
Dept: LAB | Facility: CLINIC | Age: 76
End: 2021-02-17
Payer: MEDICARE

## 2021-02-17 DIAGNOSIS — E78.00 HYPERCHOLESTEROLEMIA: Chronic | ICD-10-CM

## 2021-02-17 DIAGNOSIS — E03.9 ACQUIRED HYPOTHYROIDISM: Chronic | ICD-10-CM

## 2021-02-17 LAB
ALBUMIN SERPL BCP-MCNC: 3.9 G/DL (ref 3.5–5)
ALP SERPL-CCNC: 126 U/L (ref 46–116)
ALT SERPL W P-5'-P-CCNC: 15 U/L (ref 12–78)
ANION GAP SERPL CALCULATED.3IONS-SCNC: 5 MMOL/L (ref 4–13)
AST SERPL W P-5'-P-CCNC: 19 U/L (ref 5–45)
BILIRUB SERPL-MCNC: 0.53 MG/DL (ref 0.2–1)
BUN SERPL-MCNC: 13 MG/DL (ref 5–25)
CALCIUM SERPL-MCNC: 10.1 MG/DL (ref 8.3–10.1)
CHLORIDE SERPL-SCNC: 111 MMOL/L (ref 100–108)
CHOLEST SERPL-MCNC: 227 MG/DL (ref 50–200)
CO2 SERPL-SCNC: 27 MMOL/L (ref 21–32)
CREAT SERPL-MCNC: 0.88 MG/DL (ref 0.6–1.3)
GFR SERPL CREATININE-BSD FRML MDRD: 65 ML/MIN/1.73SQ M
GLUCOSE P FAST SERPL-MCNC: 90 MG/DL (ref 65–99)
HDLC SERPL-MCNC: 98 MG/DL
LDLC SERPL CALC-MCNC: 112 MG/DL (ref 0–100)
NONHDLC SERPL-MCNC: 129 MG/DL
POTASSIUM SERPL-SCNC: 4.3 MMOL/L (ref 3.5–5.3)
PROT SERPL-MCNC: 7.2 G/DL (ref 6.4–8.2)
SODIUM SERPL-SCNC: 143 MMOL/L (ref 136–145)
TRIGL SERPL-MCNC: 86 MG/DL
TSH SERPL DL<=0.05 MIU/L-ACNC: 1.1 UIU/ML (ref 0.36–3.74)

## 2021-02-17 PROCEDURE — 80053 COMPREHEN METABOLIC PANEL: CPT

## 2021-02-17 PROCEDURE — 84443 ASSAY THYROID STIM HORMONE: CPT

## 2021-02-17 PROCEDURE — 80061 LIPID PANEL: CPT

## 2021-02-17 PROCEDURE — 36415 COLL VENOUS BLD VENIPUNCTURE: CPT

## 2021-02-25 ENCOUNTER — OFFICE VISIT (OUTPATIENT)
Dept: INTERNAL MEDICINE CLINIC | Facility: CLINIC | Age: 76
End: 2021-02-25
Payer: MEDICARE

## 2021-02-25 ENCOUNTER — TELEPHONE (OUTPATIENT)
Dept: INTERNAL MEDICINE CLINIC | Facility: CLINIC | Age: 76
End: 2021-02-25

## 2021-02-25 VITALS
BODY MASS INDEX: 25.85 KG/M2 | DIASTOLIC BLOOD PRESSURE: 86 MMHG | WEIGHT: 150.6 LBS | TEMPERATURE: 97.8 F | SYSTOLIC BLOOD PRESSURE: 140 MMHG | HEART RATE: 80 BPM | OXYGEN SATURATION: 99 %

## 2021-02-25 DIAGNOSIS — D35.2 PROLACTINOMA (HCC): ICD-10-CM

## 2021-02-25 DIAGNOSIS — I10 BENIGN ESSENTIAL HYPERTENSION: Primary | Chronic | ICD-10-CM

## 2021-02-25 DIAGNOSIS — I48.21 PERMANENT ATRIAL FIBRILLATION (HCC): ICD-10-CM

## 2021-02-25 DIAGNOSIS — D35.2 PITUITARY ADENOMA (HCC): ICD-10-CM

## 2021-02-25 DIAGNOSIS — E03.9 ACQUIRED HYPOTHYROIDISM: Chronic | ICD-10-CM

## 2021-02-25 PROBLEM — K52.9 CHRONIC COLITIS: Status: ACTIVE | Noted: 2021-02-25

## 2021-02-25 PROCEDURE — 99214 OFFICE O/P EST MOD 30 MIN: CPT | Performed by: INTERNAL MEDICINE

## 2021-02-25 RX ORDER — BUTALBITAL, ACETAMINOPHEN AND CAFFEINE 50; 325; 40 MG/1; MG/1; MG/1
TABLET ORAL
Qty: 60 TABLET | Refills: 1 | Status: CANCELLED | OUTPATIENT
Start: 2021-02-25

## 2021-02-25 NOTE — ASSESSMENT & PLAN NOTE
Follows with neurology  Has been stable on MRI and has repeat MRI scheduled  Has repeat prolactin level to get done  Continue Cabergoline

## 2021-02-25 NOTE — PATIENT INSTRUCTIONS
Fall Prevention   WHAT YOU NEED TO KNOW:   Fall prevention includes ways to make your home and other areas safer  It also includes ways you can move more carefully to prevent a fall  Health conditions that cause changes in your blood pressure, vision, or muscle strength and coordination may increase your risk for falls  Medicines may also increase your risk for falls if they make you dizzy, weak, or sleepy  DISCHARGE INSTRUCTIONS:   Call 911 or have someone else call if:   · You have fallen and are unconscious  · You have fallen and cannot move part of your body  Contact your healthcare provider if:   · You have fallen and have pain or a headache  · You have questions or concerns about your condition or care  Fall prevention tips:   · Stand or sit up slowly  This may help you keep your balance and prevent falls  · Use assistive devices as directed  Your healthcare provider may suggest that you use a cane or walker to help you keep your balance  You may need to have grab bars put in your bathroom near the toilet or in the shower  · Wear shoes that fit well and have soles that   Wear shoes both inside and outside  Use slippers with good   Do not wear shoes with high heels  · Wear a personal alarm  This is a device that allows you to call 911 if you fall and need help  Ask your healthcare provider for more information  · Stay active  Exercise can help strengthen your muscles and improve your balance  Your healthcare provider may recommend water aerobics or walking  He or she may also recommend physical therapy to improve your coordination  Never start an exercise program without talking to your healthcare provider first          · Manage your medical conditions  Keep all appointments with your healthcare providers  Visit your eye doctor as directed  Home safety tips:       · Add items to prevent falls in the bathroom    Put nonslip strips on your bath or shower floor to prevent you from slipping  Use a bath mat if you do not have carpet in the bathroom  This will prevent you from falling when you step out of the bath or shower  Use a shower seat so you do not need to stand while you shower  Sit on the toilet or a chair in your bathroom to dry yourself and put on clothing  This will prevent you from losing your balance from drying or dressing yourself while you are standing  · Keep paths clear  Remove books, shoes, and other objects from walkways and stairs  Place cords for telephones and lamps out of the way so that you do not need to walk over them  Tape them down if you cannot move them  Remove small rugs  If you cannot remove a rug, secure it with double-sided tape  This will prevent you from tripping  · Install bright lights in your home  Use night lights to help light paths to the bathroom or kitchen  Always turn on the light before you start walking  · Keep items you use often on shelves within reach  Do not use a step stool to help you reach an item  · Paint or place reflective tape on the edges of your stairs  This will help you see the stairs better  Follow up with your healthcare provider as directed:  Write down your questions so you remember to ask them during your visits  © Copyright [address] Information is for End User's use only and may not be sold, redistributed or otherwise used for commercial purposes  All illustrations and images included in CareNotes® are the copyrighted property of A D [employer] M [employer] Inc  or Nan Jimenez   The above information is an  only  It is not intended as medical advice for individual conditions or treatments  Talk to your doctor, nurse or pharmacist before following any medical regimen to see if it is safe and effective for you

## 2021-02-25 NOTE — ASSESSMENT & PLAN NOTE
Stable pituitary adenoma that was found to be a prolactinoma in past  She is stable on meds  Managed by neurology

## 2021-02-25 NOTE — PROGRESS NOTES
[facility] [address] [place] [address]    NAME: Bianca Novak  AGE: 77 y o  SEX: female  : 1945     DATE: 2021     Assessment and Plan:     1  Benign essential hypertension  Assessment & Plan:  Blood pressure controlled on current anti-hypertensives  2  Acquired hypothyroidism  Assessment & Plan:  TSH is normal  Continue current dose of levothyroxine  3  Permanent atrial fibrillation (HCC)  Assessment & Plan:  [id number] score is 3 which corresponds to 3 2% annual risk of stroke  Continue xarelto and lopressor  This has been stable  4  Pituitary adenoma ([facility] )  Assessment & Plan:  Follows with neurology  Has been stable on MRI and has repeat MRI scheduled  Has repeat prolactin level to get done  Continue Cabergoline  5  [facility] )  Assessment & Plan:  Stable pituitary adenoma that was found to be a prolactinoma in past  She is stable on meds  Managed by neurology  BMI Counseling: Body mass index is 25 85 kg/m²  The BMI is above normal  Nutrition recommendations include encouraging healthy choices of fruits and vegetables and moderation in carbohydrate intake  Return in about 6 months (around 2021)  Chief Complaint:     Chief Complaint   Patient presents with    Follow-up     6 month and labs- 2021      History of Present Illness:     Patient presents for f/u and to go over labs  Health has been stable and no significant abnormalities on her labs  Follows with neurology due to pituitary adenoma/prolactinoma  She has been stable on cabergoline  Takes fioricet as needed for headaches as well  Repeat MRI ordered for future  No recent cardiac symptoms  No recent falls or hospitalizations  No bleeding episodes of xarelto  Review of Systems:     Review of Systems   Constitutional: Negative for activity change, appetite change and fatigue     Respiratory: Negative for apnea, cough, chest tightness, shortness of breath and wheezing  Cardiovascular: Negative for chest pain, palpitations and leg swelling  Gastrointestinal: Negative for abdominal distention, abdominal pain, blood in stool, constipation, diarrhea, nausea and vomiting  Neurological: Positive for headaches  Negative for dizziness, weakness, light-headedness and numbness  Psychiatric/Behavioral: Negative for behavioral problems, confusion, hallucinations, sleep disturbance and suicidal ideas  The patient is not nervous/anxious  Objective:     /86 (BP Location: Left arm, Patient Position: Sitting, Cuff Size: Standard)   Pulse 80   Temp 97 8 °F (36 6 °C) (Temporal) Comment: NO NSAIDS  Wt 68 3 kg (150 lb 9 6 oz) Comment: w/ shoes denied off  LMP  (LMP Unknown)   SpO2 99%   BMI 25 85 kg/m²     Physical Exam  Vitals signs reviewed  Constitutional:       General: She is not in acute distress  Appearance: She is well-developed  She is not diaphoretic  Neck:      Musculoskeletal: Neck supple  Thyroid: No thyromegaly  Vascular: No JVD  Cardiovascular:      Rate and Rhythm: Normal rate  Rhythm irregularly irregular  Heart sounds: Normal heart sounds  No murmur  Pulmonary:      Effort: Pulmonary effort is normal  No respiratory distress  Breath sounds: Normal breath sounds  No wheezing or rales  Abdominal:      General: Bowel sounds are normal  There is no distension  Palpations: Abdomen is soft  Tenderness: There is no abdominal tenderness  Musculoskeletal:      Right lower leg: No edema  Left lower leg: No edema  Lymphadenopathy:      Cervical: No cervical adenopathy  Skin:     General: Skin is warm and dry  Neurological:      Mental Status: She is alert     Psychiatric:         Mood and Affect: Mood normal          Behavior: Behavior normal        Chris Dias DO  MEDICAL [id number]

## 2021-02-25 NOTE — ASSESSMENT & PLAN NOTE
[id number] score is 3 which corresponds to 3 2% annual risk of stroke  Continue xarelto and lopressor  This has been stable

## 2021-03-01 ENCOUNTER — LAB (OUTPATIENT)
Dept: LAB | Facility: HOSPITAL | Age: 76
End: 2021-03-01
Attending: PSYCHIATRY & NEUROLOGY
Payer: MEDICARE

## 2021-03-01 DIAGNOSIS — D35.2 PITUITARY ADENOMA (HCC): ICD-10-CM

## 2021-03-01 LAB — PROLACTIN SERPL-MCNC: 10.3 NG/ML

## 2021-03-01 PROCEDURE — 36415 COLL VENOUS BLD VENIPUNCTURE: CPT

## 2021-03-01 PROCEDURE — 84146 ASSAY OF PROLACTIN: CPT

## 2021-03-02 ENCOUNTER — OFFICE VISIT (OUTPATIENT)
Dept: CARDIOLOGY CLINIC | Facility: CLINIC | Age: 76
End: 2021-03-02
Payer: MEDICARE

## 2021-03-02 VITALS
OXYGEN SATURATION: 97 % | HEART RATE: 78 BPM | HEIGHT: 64 IN | WEIGHT: 141 LBS | SYSTOLIC BLOOD PRESSURE: 148 MMHG | BODY MASS INDEX: 24.07 KG/M2 | DIASTOLIC BLOOD PRESSURE: 86 MMHG

## 2021-03-02 DIAGNOSIS — I95.1 ORTHOSTATIC HYPOTENSION: ICD-10-CM

## 2021-03-02 DIAGNOSIS — I48.20 CHRONIC ATRIAL FIBRILLATION (HCC): Primary | ICD-10-CM

## 2021-03-02 DIAGNOSIS — Z79.01 CHRONIC ANTICOAGULATION: ICD-10-CM

## 2021-03-02 DIAGNOSIS — I10 ESSENTIAL HYPERTENSION: ICD-10-CM

## 2021-03-02 PROCEDURE — 99214 OFFICE O/P EST MOD 30 MIN: CPT | Performed by: INTERNAL MEDICINE

## 2021-03-02 NOTE — PROGRESS NOTES
[facility]  [address]  [place] [address] [id number]  Cardiology Follow Up    Yanci Leavitt  1945  [id number]      1  Chronic atrial fibrillation (HCC)  Echo complete with contrast if indicated   2  Chronic anticoagulation     3  Orthostatic hypotension     4  Essential hypertension         Chief Complaint   Patient presents with    Follow-up       Interval History:   Patient presents for follow-up visit  Patient denies any history of chest pain shortness of breath  Patient denies any history of leg edema or orthopnea PND  No history of presyncope syncope  Patient states compliance with the present list of medications  Patient denies any bleeding issues  Patient also has history of pituitary adenoma and is followed by Neurology and Neurosurgery  Patient does have history of orthostatic hypotension and is on ProAmatine 2 5 mg 3 times a day      Patient Active Problem List   Diagnosis    Atrial fibrillation ([facility] )    Benign essential hypertension    Chronic anticoagulation    Anxiety    Esophageal ring, acquired    Gastroesophageal reflux disease with esophagitis    Hypercholesterolemia    Hypothyroidism    Osteopenia of multiple sites    [facility]    Prolactinoma ([facility] )    Third nerve palsy of left eye    Vitamin D deficiency    Chronic tension-type headache, not intractable    Chronic colitis     Past Medical History:   Diagnosis Date    A-fib ([facility] )     Abnormal brain MRI     Anemia     last assessed: 12/14/2015    Ankle fracture     Anxiety     last assessed: 11/14/2017    Atrial fibrillation ([facility] )     last assessed: 1/18/2015    Blepharoptosis     Brain mass     Cataract     Colon polyps     Depression     Disease of thyroid gland     Dysphagia     Elevated prolactin level     Esophageal perforation     Fracture, shoulder     GERD (gastroesophageal reflux disease)     Headache     Hypercholesterolemia     Hyperlipidemia     Hyperprolactinemia ([facility] )     Hypertension     Hypotension     Hypothyroidism     last assessed: 11/20/2017    Left heart failure (HCC)     Loss of smell     Lumbago with sciatica     Macroadenoma     last assessed: 7/23/2015    Migraine     Obesity     Osteoporosis     Pituitary adenoma ([facility] )     last assessed: 11/20/2017    Pituitary tumor     Prolactinoma ([place] [address] )     Renal failure     Syncope     Third nerve palsy     Unstable balance     Vitamin D deficiency     Wrist fracture      Social History     Socioeconomic History    Marital status:      Spouse name: Not on file    Number of children: Not on file    Years of education: completed technical school    Highest education level: Not on file   Occupational History    Occupation: Xray tech     Comment: retired   Social Needs    Financial resource strain: Not on file    Food insecurity     Worry: Not on file     Inability: Not on file   ACE needs     Medical: Not on file     Non-medical: Not on file   Tobacco Use    Smoking status: Never Smoker    Smokeless tobacco: Never Used   Substance and Sexual Activity    Alcohol use: Yes     Frequency: 2-4 times a month     Drinks per session: 1 or 2     Binge frequency: Never     Comment: social; drinks wine    Drug use: No    Sexual activity: Not Currently   Lifestyle    Physical activity     Days per week: 0 days     Minutes per session: 0 min    Stress:  To some extent   Relationships    Social connections     Talks on phone: Not on file     Gets together: Not on file     Attends [employer] service: Not on file     Active member of club or organization: Not on file     Attends meetings of clubs or organizations: Not on file     Relationship status: Not on file    Intimate partner violence     Fear of current or ex partner: Not on file     Emotionally abused: Not on file     Physically abused: Not on file     Forced sexual activity: Not on file Other Topics Concern    Not on file   Social History Narrative    Functioning activity level-participates in moderate activities both inside and outside of the home    Lives independently      Family History   Problem Relation Age of Onset    Osteoporosis Mother    Harley Other Mother         sepsis    Other Father         cerebellar hemorrhage; hemorrhage in brain    Hypertension Father     Thyroid disease Sister         thyroid disorder    Colon cancer Maternal Grandmother     Heart attack Maternal Grandfather         acute myocardial infarction    Stomach cancer Paternal Grandmother     Other Paternal Grandfather         urinary retention    Stroke Paternal Uncle         syndrome     Past Surgical History:   Procedure Laterality Date    APPENDECTOMY      CATARACT EXTRACTION      CORONARY ANGIOPLASTY WITH STENT PLACEMENT  01/2016    DILATION AND CURETTAGE OF UTERUS      ELBOW SURGERY Left     replacement    ESOPHAGOGASTRODUODENOSCOPY  08/29/2012    diagnostic    FIXATION KYPHOPLASTY      HYSTERECTOMY      CT ESOPHAGOGASTRODUODENOSCOPY TRANSORAL DIAGNOSTIC N/A 1/25/2018    Procedure: ESOPHAGOGASTRODUODENOSCOPY (EGD); Surgeon: Nick Sherman MD;  Location: [facility];   Service: Gastroenterology    TONSILLECTOMY      TOTAL ABDOMINAL HYSTERECTOMY W/ BILATERAL SALPINGOOPHORECTOMY      TOTAL ELBOW REPLACEMENT  02/07/2019    VERTEBRAL AUGMENTATION      percutaneous vertebral augmentation kyphoplasty       Current Outpatient Medications:     amoxicillin (AMOXIL) 500 MG tablet, Take 500 mg by mouth 4 tablets 1 hour before dental procedure, Disp: , Rfl:     butalbital-acetaminophen-caffeine (FIORICET,ESGIC) -40 mg per tablet, TAKE 1 TABLET BY MOUTH TWICE A DAY AS NEEDED FOR HEADACHES, Disp: 60 tablet, Rfl: 1    cabergoline (DOSTINEX) 0 5 MG tablet, TAKE 1 TABLET BY MOUTH EVERY DAY, Disp: 90 tablet, Rfl: 1    Cholecalciferol (VITAMIN D) 2000 units CAPS, Take 1 tablet by mouth daily, Disp: , Rfl:     levothyroxine 50 mcg tablet, TAKE 1 TABLET BY MOUTH EVERY DAY, Disp: 90 tablet, Rfl: 3    lisinopril (ZESTRIL) 5 mg tablet, TAKE 1 TABLET BY MOUTH EVERY DAY, Disp: 90 tablet, Rfl: 3    LORazepam (ATIVAN) 1 mg tablet, TAKE1 TABLET BY MOUTH 2 TIMES A DAY AS NEEDED FOR ANXIETY, Disp: 60 tablet, Rfl: 3    metoprolol tartrate (LOPRESSOR) 50 mg tablet, TAKE 1 TABLET BY MOUTH TWICE A DAY, Disp: 180 tablet, Rfl: 0    midodrine (PROAMATINE) 2 5 mg tablet, TAKE 1 TABLET (2 5 MG TOTAL) BY MOUTH 3 (THREE) TIMES A DAY, Disp: 270 tablet, Rfl: 3    pantoprazole (PROTONIX) 40 mg tablet, TAKE 1 TABLET BY MOUTH DAILY BEFORE BREAKFAST, Disp: 90 tablet, Rfl: 3    simvastatin (ZOCOR) 20 mg tablet, TAKE 1 TABLET DAILY AT BEDTIME , Disp: 90 tablet, Rfl: 3    Xarelto 20 MG tablet, TAKE 1 TABLET BY MOUTH EVERY DAY, Disp: 90 tablet, Rfl: 1  Allergies   Allergen Reactions    Cephalosporins Hives    Keflex [Cephalexin] Hives    Gabapentin Swelling       Labs:  Lab on 03/01/2021   Component Date Value    Prolactin 03/01/2021 10 3    Lab on 02/17/2021   Component Date Value    TSH 3RD GENERATON 02/17/2021 1 100     Cholesterol 02/17/2021 227*    Triglycerides 02/17/2021 86     HDL, Direct 02/17/2021 98     LDL Calculated 02/17/2021 112*    Non-HDL-Chol (CHOL-HDL) 02/17/2021 129     Sodium 02/17/2021 143     Potassium 02/17/2021 4 3     Chloride 02/17/2021 111*    CO2 02/17/2021 27     ANION GAP 02/17/2021 5     BUN 02/17/2021 13     Creatinine 02/17/2021 0 88     Glucose, Fasting 02/17/2021 90     Calcium 02/17/2021 10 1     AST 02/17/2021 19     ALT 02/17/2021 15     Alkaline Phosphatase 02/17/2021 126*    Total Protein 02/17/2021 7 2     Albumin 02/17/2021 3 9     Total Bilirubin 02/17/2021 0 53     eGFR 02/17/2021 65      Imaging: No results found      Review of Systems:  Review of Systems     REVIEW OF SYSTEMS:  Constitutional:  Denies fever or chills   Eyes:  Denies change in visual acuity   HENT: Denies nasal congestion or sore throat   Respiratory:  Denies cough or shortness of breath   Cardiovascular:  Denies chest pain or edema   GI:  Denies abdominal pain, nausea, vomiting, bloody stools or diarrhea   :  Denies dysuria, frequency, difficulty in micturition and nocturia  Musculoskeletal:  Denies back pain or joint pain   Neurologic:  Denies headache, focal weakness or sensory changes   Endocrine:  Denies polyuria or polydipsia   Lymphatic:  Denies swollen glands   Psychiatric:  Denies depression or anxiety     Physical Exam:    /86   Pulse 78   Ht 5' 4" (1 626 m)   Wt 64 kg (141 lb)   LMP  (LMP Unknown)   SpO2 97%   BMI 24 20 kg/m²     Physical Exam   PHYSICAL EXAM:  General:  Patient is not in acute distress   Head: Normocephalic, Atraumatic  HEENT:  Both pupils normal-size atraumatic, normocephalic, nonicteric  Neck:  JVP not raised  Trachea central  No carotid bruit  Respiratory:  normal breath sounds no crackles  no rhonchi  Cardiovascular:   Irregularly irregular  GI:  Abdomen soft nontender  No organomegaly  Lymphatic:  No cervical or inguinal lymphadenopathy  Neurologic:  Patient is awake alert, oriented   Grossly nonfocal    Extremities trace edema    Discussion/Summary:   Patient with multiple medical problems who seems to be doing reasonably well from cardiac standpoint  Previous studies reviewed with patient  Medications reviewed and possible side effects discussed  concepts of cardiovascular disease , signs and symptoms of heart disease  Dietary and risk factor modification reinforced  All questions answered  Safety measures reviewed  Patient advised to report any problems prompting medical attention  patient will be scheduled for an echocardiogram to evaluate ejection fraction valves and look for evidence of pulmonary hypertension given history of chronic atrial fibrillation  Last echocardiogram was 4 years ago        Risks and benefits  and alternativesof anticoagulation to prevent thromboembolic risk from atrial fibrillation discussed at length  Patient to report any bleeding issues  Follow-up in 6 months or earlier as needed  Follow-up with primary care physician and Neurology  Recent blood work reviewed with the patient

## 2021-03-06 DIAGNOSIS — E78.5 HYPERLIPIDEMIA, UNSPECIFIED HYPERLIPIDEMIA TYPE: ICD-10-CM

## 2021-03-08 DIAGNOSIS — G44.229 CHRONIC TENSION-TYPE HEADACHE, NOT INTRACTABLE: ICD-10-CM

## 2021-03-08 DIAGNOSIS — D35.2 PITUITARY ADENOMA (HCC): Chronic | ICD-10-CM

## 2021-03-08 RX ORDER — SIMVASTATIN 20 MG
TABLET ORAL
Qty: 90 TABLET | Refills: 3 | Status: SHIPPED | OUTPATIENT
Start: 2021-03-08 | End: 2021-09-16

## 2021-03-08 RX ORDER — BUTALBITAL, ACETAMINOPHEN AND CAFFEINE 50; 325; 40 MG/1; MG/1; MG/1
TABLET ORAL
Qty: 60 TABLET | Refills: 0 | Status: SHIPPED | OUTPATIENT
Start: 2021-03-08 | End: 2021-04-06

## 2021-03-15 ENCOUNTER — IMMUNIZATIONS (OUTPATIENT)
Dept: FAMILY MEDICINE CLINIC | Facility: HOSPITAL | Age: 76
End: 2021-03-15

## 2021-03-15 DIAGNOSIS — Z23 ENCOUNTER FOR IMMUNIZATION: Primary | ICD-10-CM

## 2021-03-15 PROCEDURE — 91300 SARS-COV-2 / COVID-19 MRNA VACCINE (PFIZER-BIONTECH) 30 MCG: CPT

## 2021-03-15 PROCEDURE — 0001A SARS-COV-2 / COVID-19 MRNA VACCINE (PFIZER-BIONTECH) 30 MCG: CPT

## 2021-03-22 ENCOUNTER — HOSPITAL ENCOUNTER (OUTPATIENT)
Dept: NON INVASIVE DIAGNOSTICS | Facility: CLINIC | Age: 76
Discharge: HOME | End: 2021-03-22
Payer: MEDICARE

## 2021-03-22 DIAGNOSIS — I48.20 CHRONIC ATRIAL FIBRILLATION (HCC): ICD-10-CM

## 2021-03-22 PROCEDURE — 93306 TTE W/DOPPLER COMPLETE: CPT | Performed by: INTERNAL MEDICINE

## 2021-03-22 PROCEDURE — 93306 TTE W/DOPPLER COMPLETE: CPT

## 2021-03-24 ENCOUNTER — TELEPHONE (OUTPATIENT)
Dept: CARDIOLOGY CLINIC | Facility: CLINIC | Age: 76
End: 2021-03-24

## 2021-03-24 NOTE — TELEPHONE ENCOUNTER
----- Message from Belinda Valles MD sent at 3/23/2021  9:57 PM EDT -----  Please call  Echocardiogram shows normal ejection fraction with mild mitral regurgitation  Continue present medications and report any symptoms out of the ordinary

## 2021-04-06 DIAGNOSIS — G44.229 CHRONIC TENSION-TYPE HEADACHE, NOT INTRACTABLE: ICD-10-CM

## 2021-04-06 DIAGNOSIS — D35.2 PITUITARY ADENOMA (HCC): Chronic | ICD-10-CM

## 2021-04-06 DIAGNOSIS — F41.9 ANXIETY: ICD-10-CM

## 2021-04-06 RX ORDER — BUTALBITAL, ACETAMINOPHEN AND CAFFEINE 50; 325; 40 MG/1; MG/1; MG/1
TABLET ORAL
Qty: 30 TABLET | Refills: 0 | Status: SHIPPED | OUTPATIENT
Start: 2021-04-06 | End: 2021-05-18 | Stop reason: SDUPTHER

## 2021-04-07 RX ORDER — LORAZEPAM 1 MG/1
TABLET ORAL
Qty: 60 TABLET | Refills: 3 | Status: SHIPPED | OUTPATIENT
Start: 2021-04-07 | End: 2021-08-11 | Stop reason: SDUPTHER

## 2021-04-07 NOTE — TELEPHONE ENCOUNTER
Controlled Substance Review    [place] PDMP or [place]  reviewed: No red flags were identified; safe to proceed with prescription      PDMP Review       Value Time User    PDMP Reviewed  Yes 4/7/2021  9:44 AM Eliana Erazo DO

## 2021-04-09 ENCOUNTER — IMMUNIZATIONS (OUTPATIENT)
Dept: FAMILY MEDICINE CLINIC | Facility: HOSPITAL | Age: 76
End: 2021-04-09

## 2021-04-09 DIAGNOSIS — Z23 ENCOUNTER FOR IMMUNIZATION: Primary | ICD-10-CM

## 2021-04-09 PROCEDURE — 0002A SARS-COV-2 / COVID-19 MRNA VACCINE (PFIZER-BIONTECH) 30 MCG: CPT

## 2021-04-09 PROCEDURE — 91300 SARS-COV-2 / COVID-19 MRNA VACCINE (PFIZER-BIONTECH) 30 MCG: CPT

## 2021-04-20 DIAGNOSIS — I10 ESSENTIAL HYPERTENSION: ICD-10-CM

## 2021-04-20 RX ORDER — METOPROLOL TARTRATE 50 MG/1
TABLET, FILM COATED ORAL
Qty: 180 TABLET | Refills: 0 | Status: SHIPPED | OUTPATIENT
Start: 2021-04-20 | End: 2021-05-07

## 2021-04-29 DIAGNOSIS — I48.91 ATRIAL FIBRILLATION, UNSPECIFIED TYPE (HCC): ICD-10-CM

## 2021-04-29 RX ORDER — RIVAROXABAN 20 MG/1
TABLET, FILM COATED ORAL
Qty: 90 TABLET | Refills: 1 | Status: SHIPPED | OUTPATIENT
Start: 2021-04-29 | End: 2021-09-16

## 2021-05-07 DIAGNOSIS — I10 ESSENTIAL HYPERTENSION: ICD-10-CM

## 2021-05-07 RX ORDER — METOPROLOL TARTRATE 50 MG/1
TABLET, FILM COATED ORAL
Qty: 180 TABLET | Refills: 0 | Status: SHIPPED | OUTPATIENT
Start: 2021-05-07 | End: 2021-08-17

## 2021-05-18 ENCOUNTER — OFFICE VISIT (OUTPATIENT)
Dept: NEUROLOGY | Facility: CLINIC | Age: 76
End: 2021-05-18
Payer: MEDICARE

## 2021-05-18 VITALS
SYSTOLIC BLOOD PRESSURE: 120 MMHG | DIASTOLIC BLOOD PRESSURE: 74 MMHG | BODY MASS INDEX: 24.75 KG/M2 | WEIGHT: 145 LBS | HEART RATE: 78 BPM | HEIGHT: 64 IN | RESPIRATION RATE: 16 BRPM

## 2021-05-18 DIAGNOSIS — G44.229 CHRONIC TENSION-TYPE HEADACHE, NOT INTRACTABLE: ICD-10-CM

## 2021-05-18 DIAGNOSIS — D35.2 PITUITARY ADENOMA (HCC): Chronic | ICD-10-CM

## 2021-05-18 PROCEDURE — 99213 OFFICE O/P EST LOW 20 MIN: CPT | Performed by: PSYCHIATRY & NEUROLOGY

## 2021-05-18 RX ORDER — BUTALBITAL, ACETAMINOPHEN AND CAFFEINE 50; 325; 40 MG/1; MG/1; MG/1
1 TABLET ORAL 2 TIMES DAILY
Qty: 60 TABLET | Refills: 5 | Status: SHIPPED | OUTPATIENT
Start: 2021-05-18 | End: 2021-09-16

## 2021-05-18 NOTE — PROGRESS NOTES
Progress Note - Neurology   Aditya Judge 77 y o  female MRN: [id number]  Unit/Bed#:  Encounter: [id number]      Subjective:     Patient is here for a follow-up visit for pituitary adenoma, chronic tension-type headaches, worsened barometric pressure, and remains on cabergoline 0 5 mg daily and uses Fioricet on a p r n  basis  Recently also has been using Fioricet for her left arm pain since she could not tolerate opioids prescribed by her orthopedist and is not allowed to take nonsteroidal anti-inflammatory medications due to being on Coumadin  She does see mild relief of pain with the help of Fioricet  Patient's most recent prolactin level was normal and is scheduled to get an MRI of the brain in the near future  She describes occasional gait imbalance and has stopped using any assistive devices  ROS:   Review of Systems   Constitutional: Negative  Negative for appetite change and fever  HENT: Negative  Negative for hearing loss, tinnitus, trouble swallowing and voice change  Eyes: Negative  Negative for photophobia and pain  Respiratory: Negative  Negative for shortness of breath  Cardiovascular: Negative  Negative for palpitations  Gastrointestinal: Negative  Negative for nausea and vomiting  Endocrine: Negative  Negative for cold intolerance  Genitourinary: Negative  Negative for dysuria, frequency and urgency  Musculoskeletal: Negative  Negative for myalgias and neck pain  Skin: Negative  Negative for rash  Neurological: Positive for headaches  Negative for dizziness, tremors, seizures, syncope, facial asymmetry, speech difficulty, weakness, light-headedness and numbness  Hematological: Negative  Does not bruise/bleed easily  Psychiatric/Behavioral: Negative  Negative for confusion, hallucinations and sleep disturbance       MA review of systems was reviewed by myself    Vitals:   Vitals:    05/18/21 1348   BP: 120/74   BP Location: Right arm   Patient Position: Sitting   Cuff Size: Standard   Pulse: 78   Resp: 16   Weight: 65 8 kg (145 lb)   Height: 5' 4" (1 626 m)   ,Body mass index is 24 89 kg/m²      MEDS:      Current Outpatient Medications:     amoxicillin (AMOXIL) 500 MG tablet, Take 500 mg by mouth 4 tablets 1 hour before dental procedure, Disp: , Rfl:     butalbital-acetaminophen-caffeine (FIORICET,ESGIC) -40 mg per tablet, TAKE 1 TABLET BY MOUTH TWICE A DAY AS NEEDED FOR HEADACHES, Disp: 30 tablet, Rfl: 0    cabergoline (DOSTINEX) 0 5 MG tablet, TAKE 1 TABLET BY MOUTH EVERY DAY, Disp: 90 tablet, Rfl: 1    Cholecalciferol (VITAMIN D) 2000 units CAPS, Take 1 tablet by mouth daily, Disp: , Rfl:     levothyroxine 50 mcg tablet, TAKE 1 TABLET BY MOUTH EVERY DAY, Disp: 90 tablet, Rfl: 3    lisinopril (ZESTRIL) 5 mg tablet, TAKE 1 TABLET BY MOUTH EVERY DAY, Disp: 90 tablet, Rfl: 3    LORazepam (ATIVAN) 1 mg tablet, TAKE1 TABLET BY MOUTH 2 TIMES A DAY AS NEEDED FOR ANXIETY, Disp: 60 tablet, Rfl: 3    metoprolol tartrate (LOPRESSOR) 50 mg tablet, TAKE 1 TABLET BY MOUTH TWICE A DAY, Disp: 180 tablet, Rfl: 0    midodrine (PROAMATINE) 2 5 mg tablet, TAKE 1 TABLET (2 5 MG TOTAL) BY MOUTH 3 (THREE) TIMES A DAY, Disp: 270 tablet, Rfl: 3    pantoprazole (PROTONIX) 40 mg tablet, TAKE 1 TABLET BY MOUTH DAILY BEFORE BREAKFAST, Disp: 90 tablet, Rfl: 3    simvastatin (ZOCOR) 20 mg tablet, TAKE 1 TABLET BY MOUTH EVERYDAY AT BEDTIME, Disp: 90 tablet, Rfl: 3    Xarelto 20 MG tablet, TAKE 1 TABLET BY MOUTH EVERY DAY, Disp: 90 tablet, Rfl: 1  :    Physical Exam:  General appearance: alert, appears stated age and cooperative  Head: Normocephalic, without obvious abnormality, atraumatic      On neurological examination is patient is alert awake oriented, speech is fluent, cranial nerves 2-12 intact, and on motor and sensory exam there is no evidence of any drift of focal weakness, deep tendon reflexes are preserved, no sensory loss was noted to pinprick and light touch no evidence of any dysmetria was noted and her gait is normal based with a negative Romberg sign  No bruits were appreciable in the neck  Lab Results: I have personally reviewed pertinent reports  Imaging Studies: I have personally reviewed pertinent reports  Assessment:  1  Pituitary adenoma  2  Chronic tension-type headaches  Plan:    Patient is advised to continue present medications, home exercise program is encouraged, she reports losing weight again has lost about 9 lb of weight since the last 6 months and no  Possible etiology was noted on neurological exam  Patient felt to be deconditioned and encouraged regular exercise program   Patient will return back to see me in 6 months  Prolactin level will be requested prior to her next visit  5/18/2021,1:54 PM    Dictation voice to text software has been used in the creation of this document  Please consider this in light of any contextual or grammatical errors

## 2021-06-24 ENCOUNTER — APPOINTMENT (OUTPATIENT)
Dept: LAB | Facility: HOSPITAL | Age: 76
End: 2021-06-24
Attending: PSYCHIATRY & NEUROLOGY
Payer: MEDICARE

## 2021-06-24 DIAGNOSIS — D35.2 PITUITARY ADENOMA (HCC): Chronic | ICD-10-CM

## 2021-06-24 LAB — PROLACTIN SERPL-MCNC: 22.1 NG/ML

## 2021-06-24 PROCEDURE — 84146 ASSAY OF PROLACTIN: CPT

## 2021-06-24 PROCEDURE — 36415 COLL VENOUS BLD VENIPUNCTURE: CPT

## 2021-06-30 ENCOUNTER — TELEPHONE (OUTPATIENT)
Dept: OTHER | Facility: OTHER | Age: 76
End: 2021-06-30

## 2021-06-30 ENCOUNTER — HOSPITAL ENCOUNTER (OUTPATIENT)
Dept: MRI IMAGING | Facility: CLINIC | Age: 76
Discharge: HOME | End: 2021-06-30
Payer: MEDICARE

## 2021-06-30 DIAGNOSIS — D35.2 PITUITARY ADENOMA (HCC): Chronic | ICD-10-CM

## 2021-06-30 DIAGNOSIS — D35.2 PROLACTINOMA (HCC): ICD-10-CM

## 2021-06-30 PROCEDURE — 70551 MRI BRAIN STEM W/O DYE: CPT

## 2021-06-30 NOTE — TELEPHONE ENCOUNTER
I could only complete half of the MRI that was scheduled today  They could not find a vein to complete with the dye  Please let me know what my next step is  I will be at home tonight and tomorrow  Do I still come in for my upcoming appointment?

## 2021-07-01 DIAGNOSIS — Z01.812 PRE-PROCEDURAL LABORATORY EXAMINATION: ICD-10-CM

## 2021-07-01 DIAGNOSIS — D35.2 PITUITARY ADENOMA (HCC): Primary | ICD-10-CM

## 2021-07-01 NOTE — PROGRESS NOTES
Placed new Rx to have to MRI brain pituitary w completed prior to her fup patient was unable to get contrast infused so only able to get non contrast portion completed  This imaging is needed prior to her fup  Also should bring updated visual field test      Placed orders for bun creat in case rad requires these prior to contrast study

## 2021-07-03 DIAGNOSIS — D35.2 PITUITARY ADENOMA (HCC): ICD-10-CM

## 2021-07-06 RX ORDER — CABERGOLINE 0.5 MG/1
TABLET ORAL
Qty: 90 TABLET | Refills: 1 | Status: SHIPPED | OUTPATIENT
Start: 2021-07-06 | End: 2021-09-16

## 2021-07-23 ENCOUNTER — HOSPITAL ENCOUNTER (OUTPATIENT)
Dept: MRI IMAGING | Facility: HOSPITAL | Age: 76
Discharge: HOME | End: 2021-07-23
Attending: NEUROLOGICAL SURGERY
Payer: MEDICARE

## 2021-07-23 DIAGNOSIS — D35.2 PITUITARY ADENOMA (HCC): ICD-10-CM

## 2021-07-23 PROCEDURE — G1004 CDSM NDSC: HCPCS

## 2021-07-23 PROCEDURE — 70552 MRI BRAIN STEM W/DYE: CPT

## 2021-07-23 PROCEDURE — A9585 GADOBUTROL INJECTION: HCPCS | Performed by: NEUROLOGICAL SURGERY

## 2021-07-23 RX ADMIN — GADOBUTROL 6 ML: 604.72 INJECTION INTRAVENOUS at 12:49

## 2021-07-27 ENCOUNTER — OFFICE VISIT (OUTPATIENT)
Dept: NEUROSURGERY | Facility: CLINIC | Age: 76
End: 2021-07-27
Payer: MEDICARE

## 2021-07-27 VITALS
RESPIRATION RATE: 16 BRPM | SYSTOLIC BLOOD PRESSURE: 134 MMHG | HEART RATE: 58 BPM | BODY MASS INDEX: 24.72 KG/M2 | DIASTOLIC BLOOD PRESSURE: 86 MMHG | HEIGHT: 64 IN | TEMPERATURE: 97.3 F

## 2021-07-27 DIAGNOSIS — D35.2 PITUITARY ADENOMA (HCC): Primary | ICD-10-CM

## 2021-07-27 DIAGNOSIS — D35.2 PROLACTINOMA (HCC): ICD-10-CM

## 2021-07-27 PROCEDURE — 99214 OFFICE O/P EST MOD 30 MIN: CPT | Performed by: PHYSICIAN ASSISTANT

## 2021-07-27 NOTE — PROGRESS NOTES
Neurosurgery Office Note  Anaya Ernandez 77 y o  female MRN: [id number]      Assessment/Plan     Pituitary adenoma [facility])  Patient presents today for annual follow-up of pituitary adenoma  · Originally diagnosed January 2015 one present presented with left visual loss  At that time prolactin 358  4  · Recent ophthalmology evaluation - no papilledema  Visual fields without deficits  · Prolactin 6/24/2021 - 22 1    Imaging:  · MRI brain with contrast 7/23/2021:  Stable mass in the sella and left cavernous sinus overall size grossly unchanged measuring 1 6 x 2 2 x 1 8 cm  Pituitary stalk remains deviated to the right  No suprasellar extension  · MRI pituitary without 6/30/2021:  Stable defined lesion involving left aspect of the sella and cavernous sinus without significant change  No mass effect on optic chiasm  Plan:  · Continue monitor for any neurological symptoms  · MRI imaging reviewed with patient and compared to prior imaging  · Given stable imaging along with medical field, recommend repeat MRI brain pituitary in one year  Order placed  · Continue close follow-up with Ophthalmology  Patient reported to have follow-up in six months  · Continue ongoing follow-up with Neurology as scheduled  · Call with any questions or concerns         Diagnoses and all orders for this visit:    Pituitary adenoma ([address] )  -     Cancel: MRI brain pituitary wo and w contrast; Future  -     MRI brain pituitary wo and w contrast; Future    Prolactinoma ([address] )  -     Cancel: MRI brain pituitary wo and w contrast; Future  -     MRI brain pituitary wo and w contrast; Future            CHIEF COMPLAINT    Chief Complaint   Patient presents with    Follow-up       HISTORY    History of Present Illness       This is a 42-year-old female past medical history significant for pituitary adenoma/prolactinoma originally diagnosed January 2015, migraines, atrial fibrillation on Xarelto, hypertension, hyperlipidemia and hypothyroidism who presents today for annual follow-up of pituitary adenoma  Patient was originally diagnosed in January 2015 when she presented with left visual loss  At that time prolactin was greater than 300  She was treated with improvement of her levels with prolactin now at normal level  Patient remains on Cabergoline  Patient admits to some imbalance Nissen dizziness  She states she feels dizzy with change in position  Patient continues with left arm weakness secondary to prior fracture and titanium  Continues with occasional headaches but controlled  Of note patient has sustained two falls  One episode she was in the bathroom and believe she syncopized called falling backwards into the tub  She did not seek medical help at that time  She states she was able to get up and continue her day  Patient had scheduled MRI shortly after episode and therefore deferred additional evaluation/imaging as she had no symptoms  Recommend follow-up with PCP/cardiology regarding this episode specially if there is recurrence  REVIEW OF SYSTEMS    Review of Systems   Constitutional: Negative  HENT: Negative  Eyes: Negative  Respiratory: Negative  Cardiovascular: Negative  Gastrointestinal: Negative  Endocrine: Negative  Genitourinary: Negative  Musculoskeletal: Positive for gait problem (fall risk, falling over, unsteady on her feet)  Negative for back pain, myalgias and neck pain  Skin: Negative  Allergic/Immunologic: Negative  Neurological: Positive for dizziness, weakness (left arm ) and headaches (occasionally)  Negative for tremors, seizures and numbness  Hematological: Bruises/bleeds easily (medications)  Psychiatric/Behavioral: Negative            Meds/Allergies     Current Outpatient Medications   Medication Sig Dispense Refill    butalbital-acetaminophen-caffeine (FIORICET,ESGIC) -40 mg per tablet Take 1 tablet by mouth 2 (two) times a day 2 times daily PRN 60 tablet 5    cabergoline (DOSTINEX) 0 5 MG tablet TAKE 1 TABLET BY MOUTH EVERY DAY 90 tablet 1    Cholecalciferol (VITAMIN D) 2000 units CAPS Take 1 tablet by mouth daily      levothyroxine 50 mcg tablet TAKE 1 TABLET BY MOUTH EVERY DAY 90 tablet 3    lisinopril (ZESTRIL) 5 mg tablet TAKE 1 TABLET BY MOUTH EVERY DAY 90 tablet 3    LORazepam (ATIVAN) 1 mg tablet TAKE1 TABLET BY MOUTH 2 TIMES A DAY AS NEEDED FOR ANXIETY 60 tablet 3    metoprolol tartrate (LOPRESSOR) 50 mg tablet TAKE 1 TABLET BY MOUTH TWICE A  tablet 0    midodrine (PROAMATINE) 2 5 mg tablet TAKE 1 TABLET (2 5 MG TOTAL) BY MOUTH 3 (THREE) TIMES A  tablet 3    pantoprazole (PROTONIX) 40 mg tablet TAKE 1 TABLET BY MOUTH DAILY BEFORE BREAKFAST 90 tablet 3    simvastatin (ZOCOR) 20 mg tablet TAKE 1 TABLET BY MOUTH EVERYDAY AT BEDTIME 90 tablet 3    Xarelto 20 MG tablet TAKE 1 TABLET BY MOUTH EVERY DAY 90 tablet 1    amoxicillin (AMOXIL) 500 MG tablet Take 500 mg by mouth 4 tablets 1 hour before dental procedure (Patient not taking: Reported on 7/27/2021)       No current facility-administered medications for this visit         Allergies   Allergen Reactions    Cephalosporins Hives    Keflex [Cephalexin] Hives    Gabapentin Swelling       PAST HISTORY    Past Medical History:   Diagnosis Date    A-fib ([facility] )     Abnormal brain MRI     Anemia     last assessed: 12/14/2015    Ankle fracture     Anxiety     last assessed: 11/14/2017    Atrial fibrillation ([facility] )     last assessed: 1/18/2015    Blepharoptosis     Brain mass     Cataract     Colon polyps     Depression     Disease of thyroid gland     Dysphagia     Elevated prolactin level     Esophageal perforation     Fracture, shoulder     GERD (gastroesophageal reflux disease)     Headache     Hypercholesterolemia     Hyperlipidemia     Hyperprolactinemia ([facility] )     Hypertension     Hypotension     Hypothyroidism     last assessed: 11/20/2017    Left heart failure ([facility] )  Loss of smell     Lumbago with sciatica     Macroadenoma     last assessed: 7/23/2015    Migraine     Obesity     Osteoporosis     Pit[facility] aden[facility] )     last assessed: 11/20/2017    Pituitary tumor     Prolactinoma ([facility] )     Renal failure     Syncope     Third nerve palsy     Unstable balance     Vitamin D deficiency     Wrist fracture        Past Surgical History:   Procedure Laterality Date    APPENDECTOMY      CATARACT EXTRACTION      CORONARY ANGIOPLASTY WITH STENT PLACEMENT  01/2016    DILATION AND CURETTAGE OF UTERUS      ELBOW SURGERY Left     replacement    ESOPHAGOGASTRODUODENOSCOPY  08/29/2012    diagnostic    FIXATION KYPHOPLASTY      HYSTERECTOMY      SC ESOPHAGOGASTRODUODENOSCOPY TRANSORAL DIAGNOSTIC N/A 1/25/2018    Procedure: ESOPHAGOGASTRODUODENOSCOPY (EGD); Surgeon: Maximus Acevedo MD;  Location: [facility]; Service: Gastroenterology    TONSILLECTOMY      TOTAL ABDOMINAL HYSTERECTOMY W/ BILATERAL SALPINGOOPHORECTOMY      TOTAL ELBOW REPLACEMENT  02/07/2019    VERTEBRAL AUGMENTATION      percutaneous vertebral augmentation kyphoplasty       Social History     Tobacco Use    Smoking status: Never Smoker    Smokeless tobacco: Never Used   Vaping Use    Vaping Use: Never used   Substance Use Topics    Alcohol use: Yes     Comment: social; drinks wine    Drug use: No       Family History   Problem Relation Age of Onset    Osteoporosis Mother     Other Mother         sepsis    Other Father         cerebellar hemorrhage; hemorrhage in brain    Hypertension Father     Thyroid disease Sister         thyroid disorder    Colon cancer Maternal Grandmother     Heart attack Maternal Grandfather         acute myocardial infarction    Stomach cancer Paternal Grandmother     Other Paternal Grandfather         urinary retention    Stroke Paternal Uncle         syndrome         Above history personally reviewed         EXAM    Vitals:Blood pressure 134/86, pulse 58, temperature (!) 97 3 °F (36 3 °C), temperature source Tympanic Core, resp  rate 16, height 5' 4" (1 626 m), not currently breastfeeding  ,Body mass index is 24 72 kg/m²  Physical Exam  Constitutional:       General: She is not in acute distress  Appearance: Normal appearance  She is well-developed  She is not ill-appearing  HENT:      Head: Normocephalic and atraumatic  Right Ear: External ear normal       Left Ear: External ear normal       Nose: Nose normal       Mouth/Throat:      Mouth: Mucous membranes are moist    Eyes:      General: No scleral icterus  Right eye: No discharge  Left eye: No discharge  Extraocular Movements: Extraocular movements intact and EOM normal       Conjunctiva/sclera: Conjunctivae normal       Pupils: Pupils are equal, round, and reactive to light  Cardiovascular:      Rate and Rhythm: Normal rate  Pulmonary:      Effort: Pulmonary effort is normal  No respiratory distress  Musculoskeletal:         General: No deformity  Skin:     General: Skin is warm and dry  Findings: No rash  Neurological:      Mental Status: She is alert  Coordination: Finger-Nose-Finger Test normal       Deep Tendon Reflexes: Strength normal    Psychiatric:         Mood and Affect: Mood normal          Speech: Speech normal          Behavior: Behavior normal          Thought Content: Thought content normal          Judgment: Judgment normal          Neurologic Exam     Mental Status   Follows 3 step commands  Attention: normal  Concentration: normal    Speech: speech is normal   Level of consciousness: alert  Knowledge: good  Normal comprehension  Cranial Nerves     CN II   Visual fields full to confrontation  CN III, IV, VI   Pupils are equal, round, and reactive to light  Extraocular motions are normal    Upgaze: normal  Conjugate gaze: present    CN V   Facial sensation intact  CN VII   Facial expression full, symmetric       CN VIII Hearing: intact (finger rub)    CN XI   Right trapezius strength: normal  Left trapezius strength: normal    CN XII   Tongue: not atrophic  Fasciculations: absent  Tongue deviation: none    Motor Exam   Muscle bulk: normal  Overall muscle tone: normal  Right arm pronator drift: absent  Left arm pronator drift: absent    Strength   Strength 5/5 throughout  Sensory Exam   Light touch normal      Gait, Coordination, and Reflexes     Coordination   Finger to nose coordination: normal    Tremor   Resting tremor: absent  Intention tremor: absent  Action tremor: absent    Reflexes   Right Baum: absent  Left Baum: absent  Right ankle clonus: absent  Left ankle clonus: absent        MEDICAL DECISION MAKING    Imaging Studies:     MRI brain with contrast    Result Date: 7/26/2021  Narrative: MRI BRAIN WITH INTRAVENOUS CONTRAST INDICATION: D35 2: Benign neoplasm of pituitary gland  COMPARISON:  Recent precontrast brain MRI TECHNIQUE:  Thin section sagittal T1 and coronal T2 imaging through the sella with precontrast axial T1 through the brain  Thin section postcontrast coronal and sagittal T1 of the sella  Axial T1 brain  Axial BRAVO post contrast   IV Contrast:  6 mL of Gadobutrol injection (SINGLE-DOSE)  IMAGE QUALITY:   Diagnostic  FINDINGS: POSTCONTRAST IMAGING:  Stable appearance of ill-defined mass involving the left side of the sella and cavernous sinus  The mass extends along lateral border of the left cavernous carotid with luminal narrowing noted  The overall size is unchanged from the prior study measuring 1 6 x 2 2 x 1 8 cm  The pituitary stalk is deviated to the right  Optic chiasm unremarkable  No suprasellar extension identified    ADDITIONAL PERTINENT FINDINGS:  None  Impression: Stable mass in the sella and left cavernous sinus with differential considerations to include meningioma or pituitary macroadenoma   Workstation performed: [id number]     MRI brain pituitary wo contrast    Result Date: 7/7/2021  Narrative: MRI BRAIN AND SELLA WITHOUT CONTRAST INDICATION:  D35 2: Benign neoplasm of pituitary gland COMPARISON: MRI from 6/17/2020  TECHNIQUE: Brain: Axial diffusion-weighted imaging  Axial FLAIR and axial T2  Axial gradient  Sella: Sagittal and coronal T1  Coronal T2  Sagittal and coronal T1 postcontrast with fat suppression  Targeted images of the sella were performed requiring additional time at acquisition and interpretation of approximately 25% IV Contrast:  Limited without intravenous contrast  IMAGE QUALITY:  Diagnostic  FINDINGS: BRAIN PARENCHYMA:  There is no discrete mass, mass effect or midline shift  Brainstem and cerebellum demonstrate normal signal  There is no intracranial hemorrhage  There is no evidence of acute infarction and diffusion imaging is unremarkable  There is scattered chronic microvascular ischemic change  There is age-related involutional change  VENTRICLES:  Normal  SELLA AND PITUITARY GLAND:  There is a stable ill-defined lesion involving the left aspect of the sella and cavernous sinus  The lesion extends to the lateral margin of the cavernous sinus and there is associated mild luminal narrowing of the flow void of the cavernous segment left internal carotid artery  Overall size and appearance is not significantly changed measuring approximately 1 6 x 2 2 x 1 8 cm  There is rightward deviation of the pituitary stalk  There is no mass effect on the optic chiasm  ORBITS:  The patient is status post bilateral cataract surgery  PARANASAL SINUSES:  There is paranasal sinus because of thickening  There is a small air-fluid level within the left maxillary sinus and trace fluid within the right sphenoid sinus  VASCULATURE:  See above  CALVARIUM AND SKULL BASE:  Normal  EXTRACRANIAL SOFT TISSUES:  Normal      Impression: Stable ill-defined lesion involving the left aspect of the sella and cavernous sinus, as described above   Workstation performed: [id number] I have personally reviewed pertinent reports     and I have personally reviewed pertinent films in PACS

## 2021-07-28 NOTE — ASSESSMENT & PLAN NOTE
Patient presents today for annual follow-up of pituitary adenoma  · Originally diagnosed January 2015 one present presented with left visual loss  At that time prolactin 358  4  · Recent ophthalmology evaluation - no papilledema  Visual fields without deficits  · Prolactin 6/24/2021 - 22 1    Imaging:  · MRI brain with contrast 7/23/2021:  Stable mass in the sella and left cavernous sinus overall size grossly unchanged measuring 1 6 x 2 2 x 1 8 cm  Pituitary stalk remains deviated to the right  No suprasellar extension  · MRI pituitary without 6/30/2021:  Stable defined lesion involving left aspect of the sella and cavernous sinus without significant change  No mass effect on optic chiasm  Plan:  · Continue monitor for any neurological symptoms  · MRI imaging reviewed with patient and compared to prior imaging  · Given stable imaging along with medical field, recommend repeat MRI brain pituitary in one year  Order placed  · Continue close follow-up with Ophthalmology  Patient reported to have follow-up in six months  · Continue ongoing follow-up with Neurology as scheduled  · Call with any questions or concerns

## 2021-08-11 DIAGNOSIS — F41.9 ANXIETY: ICD-10-CM

## 2021-08-12 RX ORDER — LORAZEPAM 1 MG/1
0.5 TABLET ORAL 2 TIMES DAILY PRN
Qty: 60 TABLET | Refills: 3 | Status: SHIPPED | OUTPATIENT
Start: 2021-08-12 | End: 2021-09-16

## 2021-08-12 NOTE — TELEPHONE ENCOUNTER
Controlled Substance Review    [place]MP or [place]  reviewed: No red flags were identified; safe to proceed with prescription      PDMP Review       Value Time User    PDMP Reviewed  Yes 8/12/2021  6:51 AM Danielle Connell DO

## 2021-08-17 DIAGNOSIS — I10 ESSENTIAL HYPERTENSION: ICD-10-CM

## 2021-08-17 RX ORDER — METOPROLOL TARTRATE 50 MG/1
TABLET, FILM COATED ORAL
Qty: 180 TABLET | Refills: 0 | Status: SHIPPED | OUTPATIENT
Start: 2021-08-17 | End: 2021-09-16

## 2021-09-02 ENCOUNTER — RA CDI HCC (OUTPATIENT)
Dept: OTHER | Facility: HOSPITAL | Age: 76
End: 2021-09-02

## 2021-09-02 NOTE — PROGRESS NOTES
[facility]  coding opportunities       Chart reviewed, no opportunity found: CHART REVIEWED, NO OPPORTUNITY FOUND                        Patients insurance company: [employer]

## 2021-09-03 ENCOUNTER — APPOINTMENT (OUTPATIENT)
Dept: LAB | Facility: CLINIC | Age: 76
End: 2021-09-03
Payer: MEDICARE

## 2021-09-03 DIAGNOSIS — I10 BENIGN ESSENTIAL HYPERTENSION: Chronic | ICD-10-CM

## 2021-09-03 DIAGNOSIS — Z01.812 PRE-PROCEDURAL LABORATORY EXAMINATION: ICD-10-CM

## 2021-09-03 DIAGNOSIS — E03.9 ACQUIRED HYPOTHYROIDISM: Chronic | ICD-10-CM

## 2021-09-03 DIAGNOSIS — I48.21 PERMANENT ATRIAL FIBRILLATION (HCC): ICD-10-CM

## 2021-09-03 LAB
ANION GAP SERPL CALCULATED.3IONS-SCNC: 10 MMOL/L (ref 4–13)
BUN SERPL-MCNC: 17 MG/DL (ref 5–25)
CALCIUM SERPL-MCNC: 9.7 MG/DL (ref 8.3–10.1)
CHLORIDE SERPL-SCNC: 106 MMOL/L (ref 100–108)
CHOLEST SERPL-MCNC: 238 MG/DL (ref 50–200)
CO2 SERPL-SCNC: 23 MMOL/L (ref 21–32)
CREAT SERPL-MCNC: 0.87 MG/DL (ref 0.6–1.3)
GFR SERPL CREATININE-BSD FRML MDRD: 66 ML/MIN/1.73SQ M
GLUCOSE P FAST SERPL-MCNC: 92 MG/DL (ref 65–99)
HDLC SERPL-MCNC: 111 MG/DL
LDLC SERPL CALC-MCNC: 111 MG/DL (ref 0–100)
NONHDLC SERPL-MCNC: 127 MG/DL
POTASSIUM SERPL-SCNC: 3.7 MMOL/L (ref 3.5–5.3)
SODIUM SERPL-SCNC: 139 MMOL/L (ref 136–145)
TRIGL SERPL-MCNC: 80 MG/DL
TSH SERPL DL<=0.05 MIU/L-ACNC: 0.9 UIU/ML (ref 0.36–3.74)

## 2021-09-03 PROCEDURE — 84443 ASSAY THYROID STIM HORMONE: CPT

## 2021-09-03 PROCEDURE — 80061 LIPID PANEL: CPT

## 2021-09-03 PROCEDURE — 36415 COLL VENOUS BLD VENIPUNCTURE: CPT

## 2021-09-03 PROCEDURE — 80048 BASIC METABOLIC PNL TOTAL CA: CPT

## 2021-09-09 ENCOUNTER — OFFICE VISIT (OUTPATIENT)
Dept: INTERNAL MEDICINE CLINIC | Facility: CLINIC | Age: 76
End: 2021-09-09
Payer: MEDICARE

## 2021-09-09 VITALS
OXYGEN SATURATION: 98 % | HEIGHT: 64 IN | BODY MASS INDEX: 25.64 KG/M2 | WEIGHT: 150.2 LBS | SYSTOLIC BLOOD PRESSURE: 126 MMHG | TEMPERATURE: 98.3 F | HEART RATE: 68 BPM | DIASTOLIC BLOOD PRESSURE: 70 MMHG

## 2021-09-09 DIAGNOSIS — E78.00 HYPERCHOLESTEROLEMIA: Chronic | ICD-10-CM

## 2021-09-09 DIAGNOSIS — I48.21 PERMANENT ATRIAL FIBRILLATION (HCC): Chronic | ICD-10-CM

## 2021-09-09 DIAGNOSIS — Z00.00 MEDICARE ANNUAL WELLNESS VISIT, SUBSEQUENT: ICD-10-CM

## 2021-09-09 DIAGNOSIS — Z12.31 ENCOUNTER FOR SCREENING MAMMOGRAM FOR BREAST CANCER: ICD-10-CM

## 2021-09-09 DIAGNOSIS — E66.3 OVERWEIGHT (BMI 25.0-29.9): ICD-10-CM

## 2021-09-09 DIAGNOSIS — I10 BENIGN ESSENTIAL HYPERTENSION: Primary | Chronic | ICD-10-CM

## 2021-09-09 DIAGNOSIS — K51.80 OTHER ULCERATIVE COLITIS WITHOUT COMPLICATION (HCC): ICD-10-CM

## 2021-09-09 DIAGNOSIS — E03.9 ACQUIRED HYPOTHYROIDISM: Chronic | ICD-10-CM

## 2021-09-09 PROBLEM — K51.90 ULCERATIVE COLITIS WITHOUT COMPLICATIONS (HCC): Status: ACTIVE | Noted: 2021-02-25

## 2021-09-09 PROCEDURE — 99214 OFFICE O/P EST MOD 30 MIN: CPT | Performed by: INTERNAL MEDICINE

## 2021-09-09 PROCEDURE — 1123F ACP DISCUSS/DSCN MKR DOCD: CPT | Performed by: INTERNAL MEDICINE

## 2021-09-09 PROCEDURE — G0439 PPPS, SUBSEQ VISIT: HCPCS | Performed by: INTERNAL MEDICINE

## 2021-09-09 NOTE — PROGRESS NOTES
[facility] - [facility] [address] [place] [address]    NAME: Bianca Novak  AGE: 77 y o  SEX: female  : 1945     DATE: 2021     Assessment and Plan:     {Assess/Plan SmartLinks:30873}        No follow-ups on file       Chief Complaint:     Chief Complaint   Patient presents with    Follow-up     labs        History of Present Illness:     ***     Review of Systems:     Review of Systems     Objective:     /70   Pulse 68   Temp 98 3 °F (36 8 °C)   Ht 5' 4" (1 626 m)   Wt 68 1 kg (150 lb 3 2 oz)   LMP  (LMP Unknown)   SpO2 98%   BMI 25 78 kg/m²     Physical Exam    Kathryn Bey DO  MEDICAL [id number]

## 2021-09-09 NOTE — PROGRESS NOTES
[facility] - [facility]    NAME: Bianca Novak  AGE: 77 y o  SEX: female  : 1945     DATE: 2021     Assessment and Plan:     1  Benign essential hypertension    BP stable in office  Continue anti-HTN medications as prescribed  2  Permanent atrial fibrillation (HCC)    Stable  Continue xarelto and lopressor     - CBC; Future  - Comprehensive metabolic panel; Future    3  Acquired hypothyroidism    TSH is normal  Continue levothyroxine     - TSH, 3rd generation; Future    4  Hypercholesterolemia    Stable  Continue statin  Has very high HDL levels  5  Other ulcerative colitis without complication (HCC)    Stable at this time  Noted on recent colonoscopy  Follows with Dr Candis Mar  Return in about 6 months (around 3/9/2022) for Follow-up  Chief Complaint:     Chief Complaint   Patient presents with    Follow-up     labs        History of Present Illness: Nicolette Almanzar doing well and health has been stable  Monitored by neurology/neurosurgery for prolactinoma  Evidence of IBD on colonoscopy  Not current symptomatic  Labs are stable  She is taking medications as prescribed  Review of Systems:     Review of Systems   Constitutional: Negative  Respiratory: Negative  Cardiovascular: Negative  Gastrointestinal: Negative  Neurological: Negative  Objective:     /70   Pulse 68   Temp 98 3 °F (36 8 °C)   Ht 5' 4" (1 626 m)   Wt 68 1 kg (150 lb 3 2 oz)   LMP  (LMP Unknown)   SpO2 98%   BMI 25 78 kg/m²     Physical Exam  Constitutional:       General: She is not in acute distress  Appearance: She is not ill-appearing  Cardiovascular:      Rate and Rhythm: Normal rate  Rhythm irregularly irregular  Heart sounds: S1 normal and S2 normal  No murmur heard  Pulmonary:      Effort: Pulmonary effort is normal  No respiratory distress  Breath sounds: No wheezing     Abdominal:      General: Bowel sounds are normal  There is no distension  Tenderness: There is no abdominal tenderness  Musculoskeletal:      Right lower leg: No edema  Left lower leg: No edema  Neurological:      Mental Status: She is alert         Michael Chavez DO  [facility]

## 2021-09-09 NOTE — PROGRESS NOTES
Assessment and Plan:     1  Medicare annual wellness visit, subsequent    2  Encounter for screening mammogram for breast cancer  - Mammo screening bilateral w 3d & cad; Future     BMI Counseling: Body mass index is 25 78 kg/m²  The BMI is above normal  Nutrition recommendations include encouraging healthy choices of fruits and vegetables and moderation in carbohydrate intake  Preventive health issues were discussed with patient, and age appropriate screening tests were ordered as noted in patient's After Visit Summary  Personalized health advice and appropriate referrals for health education or preventive services given if needed, as noted in patient's After Visit Summary       History of Present Illness:     Patient presents for Medicare Annual Wellness visit    Patient Care Team:  Jaya Jay DO as PCP - General (Internal Medicine)  MD Ana Lyon PA-C Sharilyn Anes, MD     Problem List:     Patient Active Problem List   Diagnosis    Atrial fibrillation ([facility] )    Benign essential hypertension    Chronic anticoagulation    Anxiety    Esophageal ring, acquired    Gastroesophageal reflux disease with esophagitis    Hypercholesterolemia    Hypothyroidism    Osteopenia of multiple sites    Pituitary adenoma ([facility] )    Prolactinoma ([facility] )    Third nerve palsy of left eye    Vitamin D deficiency    Chronic tension-type headache, not intractable    Ulcerative colitis without complications [facility])      Past Medical and Surgical History:     Past Medical History:   Diagnosis Date    A-fib ([place] [id number] )     Abnormal brain MRI     Anemia     last assessed: 12/14/2015    Ankle fracture     Anxiety     last assessed: 11/14/2017    Atrial fibrillation ([facility] )     last assessed: 1/18/2015    Blepharoptosis     Brain mass     Cataract     Colon polyps     Depression     Disease of thyroid gland     Dysphagia     Elevated prolactin level     Esophageal perforation     Fracture, shoulder     GERD (gastroesophageal reflux disease)     Headache     Hypercholesterolemia     Hyperlipidemia     Hyperprolactinemia (HCC)     Hypertension     Hypotension     Hypothyroidism     last assessed: 11/20/2017    Left heart failure (HCC)     Loss of smell     Lumbago with sciatica     Macroadenoma     last assessed: 7/23/2015    Migraine     Obesity     Osteoporosis     Pituitary adenoma ([facility] )     last assessed: 11/20/2017    Pituitary tumor     Prolactinoma ([facility] )     Renal failure     Syncope     Third nerve palsy     Unstable balance     Vitamin D deficiency     Wrist fracture      Past Surgical History:   Procedure Laterality Date    APPENDECTOMY      CATARACT EXTRACTION      CORONARY ANGIOPLASTY WITH STENT PLACEMENT  01/2016    DILATION AND CURETTAGE OF UTERUS      ELBOW SURGERY Left     replacement    ESOPHAGOGASTRODUODENOSCOPY  08/29/2012    diagnostic    FIXATION KYPHOPLASTY      HYSTERECTOMY      UT ESOPHAGOGASTRODUODENOSCOPY TRANSORAL DIAGNOSTIC N/A 1/25/2018    Procedure: ESOPHAGOGASTRODUODENOSCOPY (EGD); Surgeon: Ingrid Billings MD;  Location: [facility];   Service: Gastroenterology    TONSILLECTOMY      TOTAL ABDOMINAL HYSTERECTOMY W/ BILATERAL SALPINGOOPHORECTOMY      TOTAL ELBOW REPLACEMENT  02/07/2019    VERTEBRAL AUGMENTATION      percutaneous vertebral augmentation kyphoplasty      Family History:     Family History   Problem Relation Age of Onset    Osteoporosis Mother     Other Mother         sepsis    Other Father         cerebellar hemorrhage; hemorrhage in brain    Hypertension Father     Thyroid disease Sister         thyroid disorder    Colon cancer Maternal Grandmother     Heart attack Maternal Grandfather         acute myocardial infarction    Stomach cancer Paternal Grandmother     Other Paternal Grandfather         urinary retention    Stroke Paternal Uncle         syndrome      Social History:     Social History Socioeconomic History    Marital status:      Spouse name: None    Number of children: None    Years of education: completed technical school    Highest education level: None   Occupational History    Occupation: Xray tech     Comment: retired   Tobacco Use    Smoking status: Never Smoker    Smokeless tobacco: Never Used   Vaping Use    Vaping Use: Never used   Substance and Sexual Activity    Alcohol use: Yes     Comment: social; drinks wine    Drug use: No    Sexual activity: Not Currently   Other Topics Concern    None   Social History Narrative    Functioning activity level-participates in moderate activities both inside and outside of the home    Lives independently     Social Determinants of Health     Financial Resource Strain:     Difficulty of Paying Living Expenses:    Food Insecurity:     Worried About Running Out of Food in the Last Year:     [address] in the Last Year:    Transportation Needs:     Lack of Transportation (Medical):  Lack of Transportation (Non-Medical):    Physical Activity: Inactive    Days of Exercise per Week: 0 days    Minutes of Exercise per Session: 0 min   Stress: Stress Concern Present    Feeling of Stress :  To some extent   Social Connections:     Frequency of Communication with Friends and Family:     Frequency of Social Gatherings with Friends and Family:     Attends [employer] Services:     Active Member of Clubs or Organizations:     Attends Club or Organization Meetings:     Marital Status:    Intimate Partner Violence:     Fear of Current or Ex-Partner:     Emotionally Abused:     Physically Abused:     Sexually Abused:       Medications and Allergies:     Current Outpatient Medications   Medication Sig Dispense Refill    amoxicillin (AMOXIL) 500 MG tablet Take 500 mg by mouth 4 tablets 1 hour before dental procedure (Patient not taking: Reported on 7/27/2021)      butalbital-acetaminophen-caffeine ([facility]) -40 mg per tablet Take 1 tablet by mouth 2 (two) times a day 2 times daily PRN 60 tablet 5    cabergoline (DOSTINEX) 0 5 MG tablet TAKE 1 TABLET BY MOUTH EVERY DAY 90 tablet 1    Cholecalciferol (VITAMIN D) 2000 units CAPS Take 1 tablet by mouth daily      levothyroxine 50 mcg tablet TAKE 1 TABLET BY MOUTH EVERY DAY 90 tablet 3    lisinopril (ZESTRIL) 5 mg tablet TAKE 1 TABLET BY MOUTH EVERY DAY 90 tablet 3    LORazepam (ATIVAN) 1 mg tablet Take 0 5 tablets (0 5 mg total) by mouth 2 (two) times a day as needed for anxiety 60 tablet 3    metoprolol tartrate (LOPRESSOR) 50 mg tablet TAKE 1 TABLET BY MOUTH TWICE A  tablet 0    midodrine (PROAMATINE) 2 5 mg tablet TAKE 1 TABLET (2 5 MG TOTAL) BY MOUTH 3 (THREE) TIMES A  tablet 3    pantoprazole (PROTONIX) 40 mg tablet TAKE 1 TABLET BY MOUTH DAILY BEFORE BREAKFAST 90 tablet 3    simvastatin (ZOCOR) 20 mg tablet TAKE 1 TABLET BY MOUTH EVERYDAY AT BEDTIME 90 tablet 3    Xarelto 20 MG tablet TAKE 1 TABLET BY MOUTH EVERY DAY 90 tablet 1     No current facility-administered medications for this visit       Allergies   Allergen Reactions    Cephalosporins Hives    Keflex [Cephalexin] Hives    Gabapentin Swelling      Immunizations:     Immunization History   Administered Date(s) Administered    INFLUENZA 12/14/2015, 10/18/2016, 09/24/2018, 09/14/2020    Influenza Split High Dose Preservative Free IM 12/14/2015, 09/24/2018    Influenza, high dose seasonal 0 7 mL 09/17/2019    Influenza, seasonal, injectable 10/16/2008, 10/18/2016, 10/20/2017    Pneumococcal Conjugate 13-Valent 01/06/2016, 05/16/2017    Pneumococcal Polysaccharide PPV23 04/21/2014, 12/20/2016    SARS-CoV-2 / COVID-19 mRNA IM (Pfizer-BioNTech) 03/15/2021, 04/09/2021      Health Maintenance:         Topic Date Due    Breast Cancer Screening: Mammogram  05/29/2015    Colorectal Cancer Screening  09/24/2025    Hepatitis C Screening  Completed         Topic Date Due    DTaP,Tdap,and Td Vaccines (1 - Tdap) Never done    Influenza Vaccine (1) 09/01/2021      Medicare Health Risk Assessment:     /70   Pulse 68   Temp 98 3 °F (36 8 °C)   Ht 5' 4" (1 626 m)   Wt 68 1 kg (150 lb 3 2 oz)   LMP  (LMP Unknown)   SpO2 98%   BMI 25 78 kg/m²      Yesenia Armendariz is here for her Subsequent Wellness visit  Last Medicare Wellness visit information reviewed, patient interviewed, no change since last AWV  Last Medicare Wellness visit information reviewed, patient interviewed and updates made to the record today  Health Risk Assessment:   Patient rates overall health as good  Patient feels that their physical health rating is same  Patient is satisfied with their life  Eyesight was rated as same  Hearing was rated as same  Patient feels that their emotional and mental health rating is same  Patients states they are never, rarely angry  Patient states they are never, rarely unusually tired/fatigued  Pain experienced in the last 7 days has been none  Patient states that she has experienced weight loss or gain in last 6 months  Depression Screening:   PHQ-2 Score: 0      Fall Risk Screening: In the past year, patient has experienced: no history of falling in past year      Urinary Incontinence Screening:   Patient has not leaked urine accidently in the last six months  Home Safety:  Patient does not have trouble with stairs inside or outside of their home  Patient has working smoke alarms and has no working carbon monoxide detector  Home safety hazards include: none  Nutrition:   Current diet is Regular  Medications:   Patient is currently taking over-the-counter supplements  OTC medications include: see medication list  Patient is able to manage medications       Activities of Daily Living (ADLs)/Instrumental Activities of Daily Living (IADLs):   Walk and transfer into and out of bed and chair?: Yes  Dress and groom yourself?: Yes    Bathe or shower yourself?: Yes    Feed yourself? Yes  Do your laundry/housekeeping?: Yes  Manage your money, pay your bills and track your expenses?: Yes  Make your own meals?: Yes    Do your own shopping?: Yes    Durable Medical Equipment Suppliers  none    Previous Hospitalizations:   Any hospitalizations or ED visits within the last 12 months?: No      Advance Care Planning:   Living will: Yes    Durable POA for healthcare: Yes    Advanced directive: Yes    Five wishes given: No      Cognitive Screening:   Provider or family/friend/caregiver concerned regarding cognition?: No    PREVENTIVE SCREENINGS      Cardiovascular Screening:    General: Screening Not Indicated and History Lipid Disorder      Diabetes Screening:     General: Screening Current      Colorectal Cancer Screening:     General: Screening Current      Breast Cancer Screening:     General: Patient Declines      Cervical Cancer Screening:    General: Screening Not Indicated      Osteoporosis Screening:    General: Screening Not Indicated and History Osteoporosis      Abdominal Aortic Aneurysm (AAA) Screening:        General: Screening Not Indicated      Lung Cancer Screening:     General: Screening Not Indicated      Hepatitis C Screening:    General: Screening Current    Screening, Brief Intervention, and Referral to Treatment (SBIRT)    Screening  Typical number of drinks in a day: 1  Typical number of drinks in a week: 4  Interpretation: Low risk drinking behavior  AUDIT-C Screenin) How often did you have a drink containing alcohol in the past year? 2 to 4 times a month  2) How many drinks did you have on a typical day when you were drinking in the past year?  1 to 2  3) How often did you have 6 or more drinks on one occasion in the past year? never    AUDIT-C Score: 2  Interpretation: Score 0-2 (female): Negative screen for alcohol misuse    Single Item Drug Screening:  How often have you used an illegal drug (including marijuana) or a prescription medication for non-medical reasons in the past year? never    Single Item Drug Screen Score: 0  Interpretation: Negative screen for possible drug use disorder    Brief Intervention  Alcohol & drug use screenings were reviewed  No concerns regarding substance use disorder identified  Other Counseling Topics:   Car/seat belt/driving safety, skin self-exam, sunscreen and regular weightbearing exercise       Michael Chavez, DO

## 2021-09-09 NOTE — PATIENT INSTRUCTIONS
Medicare Preventive Visit Patient Instructions  Thank you for completing your Welcome to Medicare Visit or Medicare Annual Wellness Visit today  Your next wellness visit will be due in one year (9/10/2022)  The screening/preventive services that you may require over the next 5-10 years are detailed below  Some tests may not apply to you based off risk factors and/or age  Screening tests ordered at today's visit but not completed yet may show as past due  Also, please note that scanned in results may not display below  Preventive Screenings:  Service Recommendations Previous Testing/Comments   Colorectal Cancer Screening  * Colonoscopy    * Fecal Occult Blood Test (FOBT)/Fecal Immunochemical Test (FIT)  * Fecal DNA/Cologuard Test  * Flexible Sigmoidoscopy Age: 54-65 years old   Colonoscopy: every 10 years (may be performed more frequently if at higher risk)  OR  FOBT/FIT: every 1 year  OR  Cologuard: every 3 years  OR  Sigmoidoscopy: every 5 years  Screening may be recommended earlier than age 48 if at higher risk for colorectal cancer  Also, an individualized decision between you and your healthcare provider will decide whether screening between the ages of 74-80 would be appropriate  Colonoscopy: 09/24/2020  FOBT/FIT: Not on file  Cologuard: Not on file  Sigmoidoscopy: Not on file    Screening Current     Breast Cancer Screening Age: 36 years old  Frequency: every 1-2 years  Not required if history of left and right mastectomy Mammogram: 05/29/2014    Patient Declines   Cervical Cancer Screening Between the ages of 21-29, pap smear recommended once every 3 years  Between the ages of 33-67, can perform pap smear with HPV co-testing every 5 years     Recommendations may differ for women with a history of total hysterectomy, cervical cancer, or abnormal pap smears in past  Pap Smear: Not on file    Screening Not Indicated   Hepatitis C Screening Once for adults born between 1945 and 1965  More frequently in patients at high risk for Hepatitis C Hep C Antibody: 09/10/2019    Screening Current   Diabetes Screening 1-2 times per year if you're at risk for diabetes or have pre-diabetes Fasting glucose: 92 mg/dL   A1C: 5 2 %    Screening Current   Cholesterol Screening Once every 5 years if you don't have a lipid disorder  May order more often based on risk factors  Lipid panel: 09/03/2021    Screening Not Indicated  History Lipid Disorder     Other Preventive Screenings Covered by Medicare:  1  Abdominal Aortic Aneurysm (AAA) Screening: covered once if your at risk  You're considered to be at risk if you have a family history of AAA  2  Lung Cancer Screening: covers low dose CT scan once per year if you meet all of the following conditions: (1) Age 50-69; (2) No signs or symptoms of lung cancer; (3) Current smoker or have quit smoking within the last 15 years; (4) You have a tobacco smoking history of at least 30 pack years (packs per day multiplied by number of years you smoked); (5) You get a written order from a healthcare provider  3  Glaucoma Screening: covered annually if you're considered high risk: (1) You have diabetes OR (2) Family history of glaucoma OR (3)  aged 48 and older OR (3)  American aged 72 and older  3  Osteoporosis Screening: covered every 2 years if you meet one of the following conditions: (1) You're estrogen deficient and at risk for osteoporosis based off medical history and other findings; (2) Have a vertebral abnormality; (3) On glucocorticoid therapy for more than 3 months; (4) Have primary hyperparathyroidism; (5) On osteoporosis medications and need to assess response to drug therapy  · Last bone density test (DXA Scan): 06/30/2016   5  HIV Screening: covered annually if you're between the age of 15-65  Also covered annually if you are younger than 13 and older than 72 with risk factors for HIV infection   For pregnant patients, it is covered up to 3 times per pregnancy  Immunizations:  Immunization Recommendations   Influenza Vaccine Annual influenza vaccination during flu season is recommended for all persons aged >= 6 months who do not have contraindications   Pneumococcal Vaccine (Prevnar and Pneumovax)  * Prevnar = PCV13  * Pneumovax = PPSV23   Adults 25-60 years old: 1-3 doses may be recommended based on certain risk factors  Adults 72 years old: Prevnar (PCV13) vaccine recommended followed by Pneumovax (PPSV23) vaccine  If already received PPSV23 since turning 65, then PCV13 recommended at least one year after PPSV23 dose  Hepatitis B Vaccine 3 dose series if at intermediate or high risk (ex: diabetes, end stage renal disease, liver disease)   Tetanus (Td) Vaccine - COST NOT COVERED BY MEDICARE PART B Following completion of primary series, a booster dose should be given every 10 years to maintain immunity against tetanus  Td may also be given as tetanus wound prophylaxis  Tdap Vaccine - COST NOT COVERED BY MEDICARE PART B Recommended at least once for all adults  For pregnant patients, recommended with each pregnancy  Shingles Vaccine (Shingrix) - COST NOT COVERED BY MEDICARE PART B  2 shot series recommended in those aged 48 and above     Health Maintenance Due:      Topic Date Due    Breast Cancer Screening: Mammogram  05/29/2015    Colorectal Cancer Screening  09/24/2025    Hepatitis C Screening  Completed     Immunizations Due:      Topic Date Due    DTaP,Tdap,and Td Vaccines (1 - Tdap) Never done    Influenza Vaccine (1) 09/01/2021     Advance Directives   What are advance directives? Advance directives are legal documents that state your wishes and plans for medical care  These plans are made ahead of time in case you lose your ability to make decisions for yourself  Advance directives can apply to any medical decision, such as the treatments you want, and if you want to donate organs  What are the types of advance directives?   There are many types of advance directives, and each state has rules about how to use them  You may choose a combination of any of the following:  · Living will: This is a written record of the treatment you want  You can also choose which treatments you do not want, which to limit, and which to stop at a certain time  This includes surgery, medicine, IV fluid, and tube feedings  · Durable power of  for healthcare [facility]): This is a written record that states who you want to make healthcare choices for you when you are unable to make them for yourself  This person, called a proxy, is usually a family member or a friend  You may choose more than 1 proxy  · Do not resuscitate (DNR) order:  A DNR order is used in case your heart stops beating or you stop breathing  It is a request not to have certain forms of treatment, such as CPR  A DNR order may be included in other types of advance directives  · Medical directive: This covers the care that you want if you are in a coma, near death, or unable to make decisions for yourself  You can list the treatments you want for each condition  Treatment may include pain medicine, surgery, blood transfusions, dialysis, IV or tube feedings, and a ventilator (breathing machine)  · Values history: This document has questions about your views, beliefs, and how you feel and think about life  This information can help others choose the care that you would choose  Why are advance directives important? An advance directive helps you control your care  Although spoken wishes may be used, it is better to have your wishes written down  Spoken wishes can be misunderstood, or not followed  Treatments may be given even if you do not want them  An advance directive may make it easier for your family to make difficult choices about your care     Weight Management   Why it is important to manage your weight:  Being overweight increases your risk of health conditions such as heart disease, high blood pressure, type 2 diabetes, and certain types of cancer  It can also increase your risk for osteoarthritis, sleep apnea, and other respiratory problems  Aim for a slow, steady weight loss  Even a small amount of weight loss can lower your risk of health problems  How to lose weight safely:  A safe and healthy way to lose weight is to eat fewer calories and get regular exercise  You can lose up about 1 pound a week by decreasing the number of calories you eat by 500 calories each day  Healthy meal plan for weight management:  A healthy meal plan includes a variety of foods, contains fewer calories, and helps you stay healthy  A healthy meal plan includes the following:  · Eat whole-grain foods more often  A healthy meal plan should contain fiber  Fiber is the part of grains, fruits, and vegetables that is not broken down by your body  Whole-grain foods are healthy and provide extra fiber in your diet  Some examples of whole-grain foods are whole-wheat breads and pastas, oatmeal, brown rice, and bulgur  · Eat a variety of vegetables every day  Include dark, leafy greens such as spinach, kale, char greens, and mustard greens  Eat yellow and orange vegetables such as carrots, sweet potatoes, and winter squash  · Eat a variety of fruits every day  Choose fresh or canned fruit (canned in its own juice or light syrup) instead of juice  Fruit juice has very little or no fiber  · Eat low-fat dairy foods  Drink fat-free (skim) milk or 1% milk  Eat fat-free yogurt and low-fat cottage cheese  Try low-fat cheeses such as mozzarella and other reduced-fat cheeses  · Choose meat and other protein foods that are low in fat  Choose beans or other legumes such as split peas or lentils  Choose fish, skinless poultry (chicken or turkey), or lean cuts of red meat (beef or pork)  Before you cook meat or poultry, cut off any visible fat  · Use less fat and oil  Try baking foods instead of frying them   Add less fat, such as margarine, sour cream, regular salad dressing and mayonnaise to foods  Eat fewer high-fat foods  Some examples of high-fat foods include french fries, doughnuts, ice cream, and cakes  · Eat fewer sweets  Limit foods and drinks that are high in sugar  This includes candy, cookies, regular soda, and sweetened drinks  Exercise:  Exercise at least 30 minutes per day on most days of the week  Some examples of exercise include walking, biking, dancing, and swimming  You can also fit in more physical activity by taking the stairs instead of the elevator or parking farther away from stores  Ask your healthcare provider about the best exercise plan for you  © Copyright [employer] 2018 Information is for End User's use only and may not be sold, redistributed or otherwise used for commercial purposes   All illustrations and images included in CareNotes® are the copyrighted property of [employer]  or [address] [place] [address]

## 2021-09-14 ENCOUNTER — TELEPHONE (OUTPATIENT)
Dept: NEUROLOGY | Facility: CLINIC | Age: 76
End: 2021-09-14

## 2021-09-15 ENCOUNTER — TELEPHONE (OUTPATIENT)
Dept: NEUROLOGY | Facility: CLINIC | Age: 76
End: 2021-09-15

## 2021-09-15 DIAGNOSIS — D35.2 PITUITARY ADENOMA (HCC): ICD-10-CM

## 2021-09-15 DIAGNOSIS — G44.039 PAROXYSMAL HEMICRANIA: Primary | ICD-10-CM

## 2021-09-15 RX ORDER — GABAPENTIN 100 MG/1
100 CAPSULE ORAL 2 TIMES DAILY
Qty: 60 CAPSULE | Refills: 1 | Status: SHIPPED | OUTPATIENT
Start: 2021-09-15 | End: 2021-10-19

## 2021-09-15 NOTE — TELEPHONE ENCOUNTER
Received call from pt  Pt was scheduled with Dr Escobar Adjutant on 11/24/2021  Appt was rescheduled due to the provider not being in the office in the afternoon  Pt was rescheduled for 5/23/2022  Pt is questioning if any blood work needs to be ordered in the meantime  Pt is also questioning if she can be seen sooner that 5/2022    Please call pt at [phone]

## 2021-09-15 NOTE — TELEPHONE ENCOUNTER
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

## 2021-09-15 NOTE — TELEPHONE ENCOUNTER
Patient said she does not want to wait until May and is concerned for her pituitary adenoma  She wants to know where Dr Imani Weber can fit her in sooner  Thank you

## 2021-09-16 DIAGNOSIS — K21.9 CHRONIC GERD: ICD-10-CM

## 2021-09-16 DIAGNOSIS — F41.9 ANXIETY: ICD-10-CM

## 2021-09-16 DIAGNOSIS — G44.229 CHRONIC TENSION-TYPE HEADACHE, NOT INTRACTABLE: ICD-10-CM

## 2021-09-16 DIAGNOSIS — E03.9 ACQUIRED HYPOTHYROIDISM: ICD-10-CM

## 2021-09-16 DIAGNOSIS — I10 ESSENTIAL HYPERTENSION: ICD-10-CM

## 2021-09-16 DIAGNOSIS — D35.2 PITUITARY ADENOMA (HCC): Chronic | ICD-10-CM

## 2021-09-16 DIAGNOSIS — I95.1 ORTHOSTATIC HYPOTENSION: ICD-10-CM

## 2021-09-16 RX ORDER — PANTOPRAZOLE SODIUM 40 MG/1
TABLET, DELAYED RELEASE ORAL
Qty: 90 TABLET | Refills: 3 | Status: SHIPPED | OUTPATIENT
Start: 2021-09-16

## 2021-09-16 RX ORDER — BUTALBITAL, ACETAMINOPHEN AND CAFFEINE 50; 325; 40 MG/1; MG/1; MG/1
TABLET ORAL
Qty: 60 TABLET | Refills: 5 | Status: SHIPPED | OUTPATIENT
Start: 2021-09-16

## 2021-09-16 RX ORDER — LISINOPRIL 5 MG/1
TABLET ORAL
Qty: 90 TABLET | Refills: 3 | Status: SHIPPED | OUTPATIENT
Start: 2021-09-16

## 2021-09-16 RX ORDER — LORAZEPAM 1 MG/1
TABLET ORAL
Qty: 60 TABLET | Refills: 3 | Status: SHIPPED | OUTPATIENT
Start: 2021-09-16

## 2021-09-16 RX ORDER — CABERGOLINE 0.5 MG/1
TABLET ORAL
Qty: 90 TABLET | Refills: 1 | Status: SHIPPED | OUTPATIENT
Start: 2021-09-16

## 2021-09-16 RX ORDER — LEVOTHYROXINE SODIUM 0.05 MG/1
TABLET ORAL
Qty: 90 TABLET | Refills: 3 | Status: SHIPPED | OUTPATIENT
Start: 2021-09-16

## 2021-09-16 RX ORDER — MIDODRINE HYDROCHLORIDE 2.5 MG/1
2.5 TABLET ORAL 3 TIMES DAILY
Qty: 270 TABLET | Refills: 3 | Status: SHIPPED | OUTPATIENT
Start: 2021-09-16

## 2021-09-16 NOTE — TELEPHONE ENCOUNTER
Controlled Substance Review    [place] PDMP or [place]  reviewed: No red flags were identified; safe to proceed with prescription      PDMP Review       Value Time User    PDMP Reviewed  Yes 9/16/2021  6:42 PM Jaya Jay DO

## 2021-10-12 NOTE — TELEPHONE ENCOUNTER
Spoke with pharmacy verified dosage with them pt told pharmacy she is taking it differently Called and lm for pt to call back and verify dosage they are taking at home  breathing is unlabored without accessory muscle use.

## 2021-10-19 DIAGNOSIS — G44.039 PAROXYSMAL HEMICRANIA: ICD-10-CM

## 2021-10-19 RX ORDER — GABAPENTIN 100 MG/1
CAPSULE ORAL
Qty: 60 CAPSULE | Refills: 1 | Status: SHIPPED | OUTPATIENT
Start: 2021-10-19

## 2021-11-01 ENCOUNTER — OFFICE VISIT (OUTPATIENT)
Dept: CARDIOLOGY CLINIC | Facility: CLINIC | Age: 76
End: 2021-11-01
Payer: MEDICARE

## 2021-11-01 VITALS
WEIGHT: 154 LBS | RESPIRATION RATE: 16 BRPM | HEIGHT: 64 IN | DIASTOLIC BLOOD PRESSURE: 74 MMHG | SYSTOLIC BLOOD PRESSURE: 126 MMHG | OXYGEN SATURATION: 97 % | BODY MASS INDEX: 26.29 KG/M2 | HEART RATE: 55 BPM

## 2021-11-01 DIAGNOSIS — I48.20 CHRONIC ATRIAL FIBRILLATION (HCC): Primary | ICD-10-CM

## 2021-11-01 DIAGNOSIS — Z79.01 CHRONIC ANTICOAGULATION: ICD-10-CM

## 2021-11-01 DIAGNOSIS — I10 ESSENTIAL HYPERTENSION: ICD-10-CM

## 2021-11-01 DIAGNOSIS — I95.1 ORTHOSTATIC HYPOTENSION: ICD-10-CM

## 2021-11-01 PROCEDURE — 99214 OFFICE O/P EST MOD 30 MIN: CPT | Performed by: INTERNAL MEDICINE

## 2021-11-10 ENCOUNTER — OFFICE VISIT (OUTPATIENT)
Dept: GASTROENTEROLOGY | Facility: CLINIC | Age: 76
End: 2021-11-10
Payer: MEDICARE

## 2021-11-10 VITALS
WEIGHT: 150 LBS | BODY MASS INDEX: 25.61 KG/M2 | DIASTOLIC BLOOD PRESSURE: 90 MMHG | SYSTOLIC BLOOD PRESSURE: 148 MMHG | HEIGHT: 64 IN | HEART RATE: 89 BPM

## 2021-11-10 DIAGNOSIS — K22.2 ESOPHAGEAL STRICTURE: ICD-10-CM

## 2021-11-10 DIAGNOSIS — K21.9 GASTROESOPHAGEAL REFLUX DISEASE, UNSPECIFIED WHETHER ESOPHAGITIS PRESENT: ICD-10-CM

## 2021-11-10 DIAGNOSIS — R13.10 DYSPHAGIA, UNSPECIFIED TYPE: Primary | ICD-10-CM

## 2021-11-10 PROCEDURE — 99214 OFFICE O/P EST MOD 30 MIN: CPT | Performed by: PHYSICIAN ASSISTANT

## 2021-11-26 ENCOUNTER — TELEPHONE (OUTPATIENT)
Dept: GASTROENTEROLOGY | Facility: HOSPITAL | Age: 76
End: 2021-11-26

## 2021-11-29 ENCOUNTER — TELEPHONE (OUTPATIENT)
Dept: INTERNAL MEDICINE CLINIC | Facility: CLINIC | Age: 76
End: 2021-11-29

## 2022-02-12 DIAGNOSIS — I10 ESSENTIAL HYPERTENSION: ICD-10-CM

## 2022-02-12 RX ORDER — METOPROLOL TARTRATE 50 MG/1
TABLET, FILM COATED ORAL
Qty: 180 TABLET | Refills: 0 | OUTPATIENT
Start: 2022-02-12